# Patient Record
Sex: FEMALE | Race: WHITE | NOT HISPANIC OR LATINO
[De-identification: names, ages, dates, MRNs, and addresses within clinical notes are randomized per-mention and may not be internally consistent; named-entity substitution may affect disease eponyms.]

---

## 2017-09-07 ENCOUNTER — APPOINTMENT (OUTPATIENT)
Dept: OTOLARYNGOLOGY | Facility: CLINIC | Age: 82
End: 2017-09-07
Payer: MEDICARE

## 2017-09-07 VITALS — SYSTOLIC BLOOD PRESSURE: 161 MMHG | OXYGEN SATURATION: 96 % | DIASTOLIC BLOOD PRESSURE: 94 MMHG | HEART RATE: 80 BPM

## 2017-09-07 PROCEDURE — 99212 OFFICE O/P EST SF 10 MIN: CPT

## 2017-09-07 RX ORDER — MOMETASONE FUROATE 1 MG/G
0.1 CREAM TOPICAL TWICE DAILY
Qty: 2 | Refills: 2 | Status: ACTIVE | OUTPATIENT
Start: 2017-09-07

## 2018-11-01 ENCOUNTER — OTHER (OUTPATIENT)
Age: 83
End: 2018-11-01

## 2019-07-20 ENCOUNTER — INPATIENT (INPATIENT)
Facility: HOSPITAL | Age: 84
LOS: 4 days | Discharge: HOME | DRG: 419 | End: 2019-07-25
Attending: SURGERY | Admitting: SURGERY
Payer: MEDICARE

## 2019-07-20 VITALS
DIASTOLIC BLOOD PRESSURE: 76 MMHG | HEART RATE: 68 BPM | RESPIRATION RATE: 19 BRPM | HEIGHT: 63 IN | TEMPERATURE: 98 F | WEIGHT: 111.99 LBS | OXYGEN SATURATION: 97 % | SYSTOLIC BLOOD PRESSURE: 134 MMHG

## 2019-07-20 LAB
ALBUMIN SERPL ELPH-MCNC: 4.1 G/DL — SIGNIFICANT CHANGE UP (ref 3.3–5)
ALP SERPL-CCNC: 120 U/L — SIGNIFICANT CHANGE UP (ref 40–120)
ALT FLD-CCNC: 58 U/L — HIGH (ref 10–45)
ANION GAP SERPL CALC-SCNC: 9 MMOL/L — SIGNIFICANT CHANGE UP (ref 5–17)
APTT BLD: 28.2 SEC — SIGNIFICANT CHANGE UP (ref 27.5–36.3)
AST SERPL-CCNC: 126 U/L — HIGH (ref 10–40)
BASOPHILS # BLD AUTO: 0.02 K/UL — SIGNIFICANT CHANGE UP (ref 0–0.2)
BASOPHILS NFR BLD AUTO: 0.2 % — SIGNIFICANT CHANGE UP (ref 0–2)
BILIRUB SERPL-MCNC: 0.3 MG/DL — SIGNIFICANT CHANGE UP (ref 0.2–1.2)
BLD GP AB SCN SERPL QL: NEGATIVE — SIGNIFICANT CHANGE UP
BUN SERPL-MCNC: 21 MG/DL — SIGNIFICANT CHANGE UP (ref 7–23)
CALCIUM SERPL-MCNC: 9.3 MG/DL — SIGNIFICANT CHANGE UP (ref 8.4–10.5)
CHLORIDE SERPL-SCNC: 102 MMOL/L — SIGNIFICANT CHANGE UP (ref 96–108)
CK MB CFR SERPL CALC: 8.6 NG/ML — HIGH (ref 0–6.7)
CK SERPL-CCNC: 166 U/L — SIGNIFICANT CHANGE UP (ref 25–170)
CO2 SERPL-SCNC: 32 MMOL/L — HIGH (ref 22–31)
CREAT SERPL-MCNC: 0.97 MG/DL — SIGNIFICANT CHANGE UP (ref 0.5–1.3)
EOSINOPHIL # BLD AUTO: 0.06 K/UL — SIGNIFICANT CHANGE UP (ref 0–0.5)
EOSINOPHIL NFR BLD AUTO: 0.5 % — SIGNIFICANT CHANGE UP (ref 0–6)
GLUCOSE SERPL-MCNC: 143 MG/DL — HIGH (ref 70–99)
HCT VFR BLD CALC: 39.6 % — SIGNIFICANT CHANGE UP (ref 34.5–45)
HGB BLD-MCNC: 12.4 G/DL — SIGNIFICANT CHANGE UP (ref 11.5–15.5)
IMM GRANULOCYTES NFR BLD AUTO: 0.5 % — SIGNIFICANT CHANGE UP (ref 0–1.5)
INR BLD: 0.96 — SIGNIFICANT CHANGE UP (ref 0.88–1.16)
LACTATE SERPL-SCNC: 1.5 MMOL/L — SIGNIFICANT CHANGE UP (ref 0.5–2)
LIDOCAIN IGE QN: 39 U/L — SIGNIFICANT CHANGE UP (ref 7–60)
LYMPHOCYTES # BLD AUTO: 1.59 K/UL — SIGNIFICANT CHANGE UP (ref 1–3.3)
LYMPHOCYTES # BLD AUTO: 13.6 % — SIGNIFICANT CHANGE UP (ref 13–44)
MCHC RBC-ENTMCNC: 28.4 PG — SIGNIFICANT CHANGE UP (ref 27–34)
MCHC RBC-ENTMCNC: 31.3 GM/DL — LOW (ref 32–36)
MCV RBC AUTO: 90.8 FL — SIGNIFICANT CHANGE UP (ref 80–100)
MONOCYTES # BLD AUTO: 0.77 K/UL — SIGNIFICANT CHANGE UP (ref 0–0.9)
MONOCYTES NFR BLD AUTO: 6.6 % — SIGNIFICANT CHANGE UP (ref 2–14)
NEUTROPHILS # BLD AUTO: 9.15 K/UL — HIGH (ref 1.8–7.4)
NEUTROPHILS NFR BLD AUTO: 78.6 % — HIGH (ref 43–77)
NRBC # BLD: 0 /100 WBCS — SIGNIFICANT CHANGE UP (ref 0–0)
PLATELET # BLD AUTO: 209 K/UL — SIGNIFICANT CHANGE UP (ref 150–400)
POTASSIUM SERPL-MCNC: 3.8 MMOL/L — SIGNIFICANT CHANGE UP (ref 3.5–5.3)
POTASSIUM SERPL-SCNC: 3.8 MMOL/L — SIGNIFICANT CHANGE UP (ref 3.5–5.3)
PROT SERPL-MCNC: 6.7 G/DL — SIGNIFICANT CHANGE UP (ref 6–8.3)
PROTHROM AB SERPL-ACNC: 10.8 SEC — SIGNIFICANT CHANGE UP (ref 10–12.9)
RBC # BLD: 4.36 M/UL — SIGNIFICANT CHANGE UP (ref 3.8–5.2)
RBC # FLD: 13.2 % — SIGNIFICANT CHANGE UP (ref 10.3–14.5)
RH IG SCN BLD-IMP: POSITIVE — SIGNIFICANT CHANGE UP
SODIUM SERPL-SCNC: 143 MMOL/L — SIGNIFICANT CHANGE UP (ref 135–145)
TROPONIN T SERPL-MCNC: <0.01 NG/ML — SIGNIFICANT CHANGE UP (ref 0–0.01)
WBC # BLD: 11.65 K/UL — HIGH (ref 3.8–10.5)
WBC # FLD AUTO: 11.65 K/UL — HIGH (ref 3.8–10.5)

## 2019-07-20 PROCEDURE — 71045 X-RAY EXAM CHEST 1 VIEW: CPT | Mod: 26

## 2019-07-20 PROCEDURE — 99285 EMERGENCY DEPT VISIT HI MDM: CPT | Mod: 25

## 2019-07-20 PROCEDURE — 93010 ELECTROCARDIOGRAM REPORT: CPT

## 2019-07-20 PROCEDURE — 76705 ECHO EXAM OF ABDOMEN: CPT | Mod: 26

## 2019-07-20 RX ORDER — ONDANSETRON 8 MG/1
4 TABLET, FILM COATED ORAL ONCE
Refills: 0 | Status: COMPLETED | OUTPATIENT
Start: 2019-07-20 | End: 2019-07-20

## 2019-07-20 RX ORDER — FAMOTIDINE 10 MG/ML
20 INJECTION INTRAVENOUS ONCE
Refills: 0 | Status: COMPLETED | OUTPATIENT
Start: 2019-07-20 | End: 2019-07-20

## 2019-07-20 RX ORDER — SODIUM CHLORIDE 9 MG/ML
3 INJECTION INTRAMUSCULAR; INTRAVENOUS; SUBCUTANEOUS ONCE
Refills: 0 | Status: COMPLETED | OUTPATIENT
Start: 2019-07-20 | End: 2019-07-20

## 2019-07-20 RX ORDER — ACETAMINOPHEN 500 MG
1000 TABLET ORAL ONCE
Refills: 0 | Status: COMPLETED | OUTPATIENT
Start: 2019-07-20 | End: 2019-07-20

## 2019-07-20 RX ORDER — POTASSIUM CHLORIDE 20 MEQ
20 PACKET (EA) ORAL ONCE
Refills: 0 | Status: COMPLETED | OUTPATIENT
Start: 2019-07-20 | End: 2019-07-20

## 2019-07-20 RX ORDER — SODIUM CHLORIDE 9 MG/ML
1000 INJECTION, SOLUTION INTRAVENOUS
Refills: 0 | Status: DISCONTINUED | OUTPATIENT
Start: 2019-07-20 | End: 2019-07-24

## 2019-07-20 RX ORDER — HYDROMORPHONE HYDROCHLORIDE 2 MG/ML
0.5 INJECTION INTRAMUSCULAR; INTRAVENOUS; SUBCUTANEOUS EVERY 6 HOURS
Refills: 0 | Status: DISCONTINUED | OUTPATIENT
Start: 2019-07-20 | End: 2019-07-24

## 2019-07-20 RX ORDER — ENOXAPARIN SODIUM 100 MG/ML
40 INJECTION SUBCUTANEOUS EVERY 24 HOURS
Refills: 0 | Status: DISCONTINUED | OUTPATIENT
Start: 2019-07-20 | End: 2019-07-24

## 2019-07-20 RX ORDER — METRONIDAZOLE 500 MG
500 TABLET ORAL EVERY 8 HOURS
Refills: 0 | Status: DISCONTINUED | OUTPATIENT
Start: 2019-07-20 | End: 2019-07-24

## 2019-07-20 RX ORDER — SODIUM CHLORIDE 9 MG/ML
1000 INJECTION INTRAMUSCULAR; INTRAVENOUS; SUBCUTANEOUS
Refills: 0 | Status: DISCONTINUED | OUTPATIENT
Start: 2019-07-20 | End: 2019-07-20

## 2019-07-20 RX ORDER — PIPERACILLIN AND TAZOBACTAM 4; .5 G/20ML; G/20ML
3.38 INJECTION, POWDER, LYOPHILIZED, FOR SOLUTION INTRAVENOUS ONCE
Refills: 0 | Status: COMPLETED | OUTPATIENT
Start: 2019-07-20 | End: 2019-07-20

## 2019-07-20 RX ORDER — ONDANSETRON 8 MG/1
4 TABLET, FILM COATED ORAL EVERY 6 HOURS
Refills: 0 | Status: DISCONTINUED | OUTPATIENT
Start: 2019-07-20 | End: 2019-07-24

## 2019-07-20 RX ORDER — CEFTRIAXONE 500 MG/1
1000 INJECTION, POWDER, FOR SOLUTION INTRAMUSCULAR; INTRAVENOUS EVERY 24 HOURS
Refills: 0 | Status: COMPLETED | OUTPATIENT
Start: 2019-07-20 | End: 2019-07-24

## 2019-07-20 RX ORDER — LANOLIN ALCOHOL/MO/W.PET/CERES
5 CREAM (GRAM) TOPICAL AT BEDTIME
Refills: 0 | Status: DISCONTINUED | OUTPATIENT
Start: 2019-07-20 | End: 2019-07-24

## 2019-07-20 RX ADMIN — ONDANSETRON 4 MILLIGRAM(S): 8 TABLET, FILM COATED ORAL at 02:43

## 2019-07-20 RX ADMIN — Medication 400 MILLIGRAM(S): at 15:42

## 2019-07-20 RX ADMIN — CEFTRIAXONE 100 MILLIGRAM(S): 500 INJECTION, POWDER, FOR SOLUTION INTRAMUSCULAR; INTRAVENOUS at 10:00

## 2019-07-20 RX ADMIN — PIPERACILLIN AND TAZOBACTAM 3.38 GRAM(S): 4; .5 INJECTION, POWDER, LYOPHILIZED, FOR SOLUTION INTRAVENOUS at 04:00

## 2019-07-20 RX ADMIN — PIPERACILLIN AND TAZOBACTAM 200 GRAM(S): 4; .5 INJECTION, POWDER, LYOPHILIZED, FOR SOLUTION INTRAVENOUS at 03:02

## 2019-07-20 RX ADMIN — FAMOTIDINE 20 MILLIGRAM(S): 10 INJECTION INTRAVENOUS at 02:42

## 2019-07-20 RX ADMIN — ENOXAPARIN SODIUM 40 MILLIGRAM(S): 100 INJECTION SUBCUTANEOUS at 09:59

## 2019-07-20 RX ADMIN — Medication 400 MILLIGRAM(S): at 06:41

## 2019-07-20 RX ADMIN — Medication 100 MILLIGRAM(S): at 10:45

## 2019-07-20 RX ADMIN — Medication 5 MILLIGRAM(S): at 22:05

## 2019-07-20 RX ADMIN — SODIUM CHLORIDE 3 MILLILITER(S): 9 INJECTION INTRAMUSCULAR; INTRAVENOUS; SUBCUTANEOUS at 02:36

## 2019-07-20 RX ADMIN — Medication 1000 MILLIGRAM(S): at 17:25

## 2019-07-20 RX ADMIN — Medication 100 MILLIGRAM(S): at 17:24

## 2019-07-20 RX ADMIN — SODIUM CHLORIDE 125 MILLILITER(S): 9 INJECTION INTRAMUSCULAR; INTRAVENOUS; SUBCUTANEOUS at 02:42

## 2019-07-20 RX ADMIN — SODIUM CHLORIDE 90 MILLILITER(S): 9 INJECTION, SOLUTION INTRAVENOUS at 23:10

## 2019-07-20 RX ADMIN — Medication 20 MILLIEQUIVALENT(S): at 14:07

## 2019-07-20 NOTE — ED PROVIDER NOTE - CLINICAL SUMMARY MEDICAL DECISION MAKING FREE TEXT BOX
cholethiasis- CBD  7 mm mult stones  admit to Dr melissa martinez 83 yo F with cholethiasis  no obv cute cholycystitis- but- CBD  7 mm mult stones - no signs to suggest acute cholangitis-  admit to Dr Yoly Bills - tele surgery  GI to follow - likely ERCP in 1-2 days

## 2019-07-20 NOTE — H&P ADULT - NSHPLABSRESULTS_GEN_ALL_CORE
12.4   11.65 )-----------( 209      ( 20 Jul 2019 02:16 )             39.6     07-20    143  |  102  |  21  ----------------------------<  143<H>  3.8   |  32<H>  |  0.97    Ca    9.3      20 Jul 2019 02:16    TPro  6.7  /  Alb  4.1  /  TBili  0.3  /  DBili  x   /  AST  126<H>  /  ALT  58<H>  /  AlkPhos  120  07-20    PT/INR - ( 20 Jul 2019 02:16 )   PT: 10.8 sec;   INR: 0.96          PTT - ( 20 Jul 2019 02:16 )  PTT:28.2 sec    RADIOLOGY  < from: US Abdomen Limited (07.20.19 @ 03:27) >    Common bile duct: Normal diameter, measuring 0.6 cm. A few stones are   seen within thecommon bile duct measuring up to 7 mm.    Gallbladder: Distended and contains multiple mobile stones gallstones.   The largest stone measures up to 1.7 cm No wall thickening or   pericholecystic fluid.    < end of copied text >

## 2019-07-20 NOTE — H&P ADULT - ASSESSMENT
84F PMHx dementia, HTN, HLD, hypothyroidism a/w choledocholithiasis.    pain/nausea control  home meds as appropriate  IS/OOB  NPO, IVF  ceftriaxone, Flagyl  SCDs, lovenox    admit to general surgery Team 4 [facility] telemetry  case discussed with chief resident, attending

## 2019-07-20 NOTE — CONSULT NOTE ADULT - ASSESSMENT
84F PMHx dementia, HTN, HLD, hypothyroidism presents with sudden onset of epigastric abdominal pain    Pain abdomen:  -no fever, vss  -Bili/ALP normal, AST//58  -RUQ US: no cholecystitis, no IHB dilatation, CBD normal at 6 mm but stones seen in CBD  - 84F PMHx dementia, HTN, HLD, hypothyroidism presents with sudden onset of epigastric abdominal pain    Pain abdomen: choledocholithiasis with gallstones  -no fever, vss  -Bili/ALP normal, AST//58  -RUQ US: no cholecystitis, no IHB dilatation, CBD normal at 6 mm but stones seen in CBD, gall stones +  -currently no e/o cholangitis, pt denies any pain abdomen  -will plan for ERCP on monday  -can start clears and advance as tolerated  -npo after midnight Sunday night  -c/w abx for now  -trend LFTs, notify GI fellow on call if nay change in clinical status      discussed with Dr Huang

## 2019-07-20 NOTE — ED ADULT NURSE NOTE - OBJECTIVE STATEMENT
Patient came in to ED c/o epigastric pain starting at 1 am. Patient denies nausea/vomiting, no SOB, no BL LE swelling noted. A+O x 2. Reports last BM yesterday,

## 2019-07-20 NOTE — H&P ADULT - HISTORY OF PRESENT ILLNESS
84F PMHx dementia, HTN, HLD, hypothyroidism presents with sudden onset of epigastric abdominal pain, nonradiating, no aggravating or alleviating factors, since 1am this morning, associated with mild nausea, no vomiting. Unsure when she last passed flatus; last bowel movement yesterday. No fevers, chills, chest pain, dyspnea, diarrhea, constipation, obstipation or calf pain. Has had symptoms like this intermittently over the last few months, usually alleviated with seltzer. Does not remember her last colonoscopy.

## 2019-07-20 NOTE — CONSULT NOTE ADULT - SUBJECTIVE AND OBJECTIVE BOX
HPI:  84F PMHx dementia, HTN, HLD, hypothyroidism presents with sudden onset of epigastric abdominal pain, nonradiating, no aggravating or alleviating factors, since 1am this morning, associated with mild nausea, no vomiting. Unsure when she last passed flatus; last bowel movement yesterday. No fevers, chills, chest pain, dyspnea, diarrhea, constipation, obstipation or calf pain. Has had symptoms like this intermittently over the last few months, usually alleviated with seltzer. Does not remember her last colonoscopy.    Allergies    No Known Allergies    Intolerances      Home Medications:  amLODIPine 2.5 mg oral tablet: 1 tab(s) orally once a day (2019 02:19)  Aricept ODT 10 mg oral tablet, disintegratin tab(s) orally once a day (at bedtime) (2019 02:19)  Aspir 81 oral delayed release tablet: 1 tab(s) orally once a day (2019 02:19)  Lipitor 10 mg oral tablet: 1 tab(s) orally once a day (2019 02:19)  Synthroid 50 mcg (0.05 mg) oral tablet: 1 tab(s) orally once a day (2019 02:19)    MEDICATIONS:  MEDICATIONS  (STANDING):  cefTRIAXone   IVPB 1000 milliGRAM(s) IV Intermittent every 24 hours  enoxaparin Injectable 40 milliGRAM(s) SubCutaneous every 24 hours  lactated ringers. 1000 milliLiter(s) (90 mL/Hr) IV Continuous <Continuous>  metroNIDAZOLE  IVPB 500 milliGRAM(s) IV Intermittent every 8 hours    MEDICATIONS  (PRN):  HYDROmorphone  Injectable 0.5 milliGRAM(s) IV Push every 6 hours PRN Severe Pain (7 - 10)  ondansetron Injectable 4 milliGRAM(s) IV Push every 6 hours PRN Nausea    PAST MEDICAL & SURGICAL HISTORY:  Dementia  HLD (hyperlipidemia)  Hypothyroid  HTN (hypertension)  No significant past surgical history    FAMILY HISTORY:  No pertinent family history in first degree relatives    SOCIAL HISTORY:  [place]: [ ] Current, [ ] Former, [ ] Never; Pack Years:  Alcohol:  Illicit Drugs:    REVIEW OF SYSTEMS:  CONSTITUTIONAL: No weakness, fevers or chills  HEENT: No visual changes; No vertigo or throat pain   NECK: No pain or stiffness  RESPIRATORY: No cough, wheezing, hemoptysis; No shortness of breath  CARDIOVASCULAR: No chest pain or palpitations  GASTROINTESTINAL: No abdominal or epigastric pain. No nausea, vomiting, or hematemesis; No diarrhea or constipation. No melena or hematochezia.  GENITOURINARY: No dysuria, frequency or hematuria  NEUROLOGICAL: No numbness or weakness  SKIN: No itching, burning, rashes, or lesions   All other 10 review of systems is negative unless indicated above.    Vital Signs Last 24 Hrs  T(C): 36.2 (2019 06:26), Max: 36.7 (2019 02:02)  T(F): 97.2 (2019 06:26), Max: 98 (2019 02:02)  HR: 70 (2019 08:40) (65 - 74)  BP: 125/74 (2019 08:40) (122/74 - 147/65)  BP(mean): 93 (2019 08:40) (93 - 93)  RR: 18 (2019 08:40) (16 - 19)  SpO2: 96% (2019 08:40) (96% - 99%)      PHYSICAL EXAM:    General: Well developed; well nourished; in no acute distress  Eyes: Anicteric sclerae, moist conjunctivae  HENT: Moist mucous membranes  Neck: Trachea midline, supple  Lungs: Normal respiratory effors and no intercostal retractions  Cardiovascular: RRR  Abdomen: Soft, non-tender non-distended; Normal bowel sounds; No rebound or guarding  Extremities: Normal range of motion, No clubbing, cyanosis or edema  Neurological: Alert and oriented x3  Skin: Warm and dry. No obvious rash    LABS:                        12.4   11.65 )-----------( 209      ( 2019 02:16 )             39.6     07-20    143  |  102  |  21  ----------------------------<  143<H>  3.8   |  32<H>  |  0.97    Ca    9.3      2019 02:16    TPro  6.7  /  Alb  4.1  /  TBili  0.3  /  DBili  x   /  AST  126<H>  /  ALT  58<H>  /  AlkPhos  120  07-20        PT/INR - ( 2019 02:16 )   PT: 10.8 sec;   INR: 0.96          PTT - ( 2019 02:16 )  PTT:28.2 sec    RADIOLOGY & ADDITIONAL STUDIES:

## 2019-07-20 NOTE — H&P ADULT - NSHPPHYSICALEXAM_GEN_ALL_CORE
T(C): 36.7 (07-20-19 @ 02:02), Max: 36.7 (07-20-19 @ 02:02)  HR: 68 (07-20-19 @ 02:02) (68 - 68)  BP: 134/76 (07-20-19 @ 02:02) (134/76 - 134/76)  RR: 19 (07-20-19 @ 02:02) (19 - 19)  SpO2: 97% (07-20-19 @ 02:02) (97% - 97%)    GEN: NAD, resting comfortably in bed  HEENT: MMM  CV: RRR  Resp: nonlabored breathing, no respiratory distress  Abd: soft, nondistended, mild epigastric tenderness, no Torres's, no surgical scars  Ext: WWP, no edema, no calf tenderness  Neuro: no focal deficits  Psych: pleasant

## 2019-07-20 NOTE — ED ADULT NURSE NOTE - CHPI ED NUR SYMPTOMS NEG
no fever/no hematuria/no nausea/no diarrhea/no blood in stool/no abdominal distension/no burning urination/no chills/no vomiting/no dysuria

## 2019-07-20 NOTE — ED ADULT NURSE REASSESSMENT NOTE - NS ED NURSE REASSESS COMMENT FT1
Patient informed by MD of ultrasound results. Surgery consult requested and pending at this time.
S/p ultrasound, results pending. No c/o voiced at this time. Ambulated to bathroom with standby assistance. IVF continues. Continue to monitor

## 2019-07-20 NOTE — PATIENT PROFILE ADULT - JOB HELP
Instructed patient/caregiver regarding signs and symptoms of infection./Keep the cast/splint/dressing clean and dry./Verbal/written post procedure instructions were given to patient/caregiver./Instructed patient/caregiver to follow-up with primary care physician.
no

## 2019-07-20 NOTE — ED PROVIDER NOTE - OBJECTIVE STATEMENT
85 yo F with hx of HTN HLD hypothyroidism with onset of epigastric pain nausea radiating up to chest 1 hr ago no sob  no diaphoresis - had dinner at 9 pm soup chicken  no diarrhea no melena or hematochezia  no fevers  no prior hx of abd surgeries or known GERD no back pain 85 yo F with hx of HTN HLD hypothyroidism with onset of epigastric pain nausea radiating up to chest 1 hr ago no sob - no diaphoresis - had dinner at 9 pm soup chicken  no diarrhea no melena or hematochezia  no fevers  no prior hx of abd surgeries or known GERD no back pain - no leg pain or calf tenderness - no recent travel or trauma

## 2019-07-21 LAB
AMYLASE P1 CFR SERPL: 46 U/L — SIGNIFICANT CHANGE UP (ref 25–125)
ANION GAP SERPL CALC-SCNC: 10 MMOL/L — SIGNIFICANT CHANGE UP (ref 5–17)
APTT BLD: 34.8 SEC — SIGNIFICANT CHANGE UP (ref 27.5–36.3)
BLD GP AB SCN SERPL QL: NEGATIVE — SIGNIFICANT CHANGE UP
BUN SERPL-MCNC: 8 MG/DL — SIGNIFICANT CHANGE UP (ref 7–23)
CALCIUM SERPL-MCNC: 8.8 MG/DL — SIGNIFICANT CHANGE UP (ref 8.4–10.5)
CHLORIDE SERPL-SCNC: 103 MMOL/L — SIGNIFICANT CHANGE UP (ref 96–108)
CO2 SERPL-SCNC: 27 MMOL/L — SIGNIFICANT CHANGE UP (ref 22–31)
CREAT SERPL-MCNC: 0.61 MG/DL — SIGNIFICANT CHANGE UP (ref 0.5–1.3)
GLUCOSE SERPL-MCNC: 100 MG/DL — HIGH (ref 70–99)
HCT VFR BLD CALC: 41 % — SIGNIFICANT CHANGE UP (ref 34.5–45)
HCT VFR BLD CALC: 43.5 % — SIGNIFICANT CHANGE UP (ref 34.5–45)
HGB BLD-MCNC: 12.6 G/DL — SIGNIFICANT CHANGE UP (ref 11.5–15.5)
HGB BLD-MCNC: 13.2 G/DL — SIGNIFICANT CHANGE UP (ref 11.5–15.5)
INR BLD: 1.03 — SIGNIFICANT CHANGE UP (ref 0.88–1.16)
LIDOCAIN IGE QN: 28 U/L — SIGNIFICANT CHANGE UP (ref 7–60)
MCHC RBC-ENTMCNC: 28.1 PG — SIGNIFICANT CHANGE UP (ref 27–34)
MCHC RBC-ENTMCNC: 28.6 PG — SIGNIFICANT CHANGE UP (ref 27–34)
MCHC RBC-ENTMCNC: 30.3 GM/DL — LOW (ref 32–36)
MCHC RBC-ENTMCNC: 30.7 GM/DL — LOW (ref 32–36)
MCV RBC AUTO: 91.5 FL — SIGNIFICANT CHANGE UP (ref 80–100)
MCV RBC AUTO: 94.2 FL — SIGNIFICANT CHANGE UP (ref 80–100)
NRBC # BLD: 0 /100 WBCS — SIGNIFICANT CHANGE UP (ref 0–0)
NRBC # BLD: 0 /100 WBCS — SIGNIFICANT CHANGE UP (ref 0–0)
PLATELET # BLD AUTO: 199 K/UL — SIGNIFICANT CHANGE UP (ref 150–400)
PLATELET # BLD AUTO: 206 K/UL — SIGNIFICANT CHANGE UP (ref 150–400)
POTASSIUM SERPL-MCNC: 3.9 MMOL/L — SIGNIFICANT CHANGE UP (ref 3.5–5.3)
POTASSIUM SERPL-SCNC: 3.9 MMOL/L — SIGNIFICANT CHANGE UP (ref 3.5–5.3)
PROTHROM AB SERPL-ACNC: 11.7 SEC — SIGNIFICANT CHANGE UP (ref 10–12.9)
RBC # BLD: 4.48 M/UL — SIGNIFICANT CHANGE UP (ref 3.8–5.2)
RBC # BLD: 4.62 M/UL — SIGNIFICANT CHANGE UP (ref 3.8–5.2)
RBC # FLD: 13.4 % — SIGNIFICANT CHANGE UP (ref 10.3–14.5)
RBC # FLD: 13.4 % — SIGNIFICANT CHANGE UP (ref 10.3–14.5)
RH IG SCN BLD-IMP: POSITIVE — SIGNIFICANT CHANGE UP
SODIUM SERPL-SCNC: 140 MMOL/L — SIGNIFICANT CHANGE UP (ref 135–145)
WBC # BLD: 5.55 K/UL — SIGNIFICANT CHANGE UP (ref 3.8–10.5)
WBC # BLD: 7.36 K/UL — SIGNIFICANT CHANGE UP (ref 3.8–10.5)
WBC # FLD AUTO: 5.55 K/UL — SIGNIFICANT CHANGE UP (ref 3.8–10.5)
WBC # FLD AUTO: 7.36 K/UL — SIGNIFICANT CHANGE UP (ref 3.8–10.5)

## 2019-07-21 PROCEDURE — 99232 SBSQ HOSP IP/OBS MODERATE 35: CPT | Mod: GC

## 2019-07-21 RX ORDER — ATORVASTATIN CALCIUM 80 MG/1
1 TABLET, FILM COATED ORAL
Qty: 0 | Refills: 0 | DISCHARGE

## 2019-07-21 RX ORDER — DONEPEZIL HYDROCHLORIDE 10 MG/1
1 TABLET, FILM COATED ORAL
Qty: 0 | Refills: 0 | DISCHARGE

## 2019-07-21 RX ORDER — SCOPALAMINE 1 MG/3D
1 PATCH, EXTENDED RELEASE TRANSDERMAL ONCE
Refills: 0 | Status: COMPLETED | OUTPATIENT
Start: 2019-07-21 | End: 2019-07-21

## 2019-07-21 RX ORDER — AMLODIPINE BESYLATE 2.5 MG/1
1 TABLET ORAL
Qty: 0 | Refills: 0 | DISCHARGE

## 2019-07-21 RX ORDER — ACETAMINOPHEN 500 MG
1000 TABLET ORAL ONCE
Refills: 0 | Status: COMPLETED | OUTPATIENT
Start: 2019-07-21 | End: 2019-07-21

## 2019-07-21 RX ORDER — ASPIRIN/CALCIUM CARB/MAGNESIUM 324 MG
1 TABLET ORAL
Qty: 0 | Refills: 0 | DISCHARGE

## 2019-07-21 RX ORDER — LEVOTHYROXINE SODIUM 125 MCG
1 TABLET ORAL
Qty: 0 | Refills: 0 | DISCHARGE

## 2019-07-21 RX ADMIN — Medication 1000 MILLIGRAM(S): at 18:56

## 2019-07-21 RX ADMIN — Medication 100 MILLIGRAM(S): at 16:43

## 2019-07-21 RX ADMIN — Medication 100 MILLIGRAM(S): at 07:21

## 2019-07-21 RX ADMIN — Medication 5 MILLIGRAM(S): at 21:39

## 2019-07-21 RX ADMIN — SCOPALAMINE 1 PATCH: 1 PATCH, EXTENDED RELEASE TRANSDERMAL at 12:26

## 2019-07-21 RX ADMIN — SODIUM CHLORIDE 90 MILLILITER(S): 9 INJECTION, SOLUTION INTRAVENOUS at 21:40

## 2019-07-21 RX ADMIN — HYDROMORPHONE HYDROCHLORIDE 0.5 MILLIGRAM(S): 2 INJECTION INTRAMUSCULAR; INTRAVENOUS; SUBCUTANEOUS at 11:04

## 2019-07-21 RX ADMIN — CEFTRIAXONE 100 MILLIGRAM(S): 500 INJECTION, POWDER, FOR SOLUTION INTRAMUSCULAR; INTRAVENOUS at 10:20

## 2019-07-21 RX ADMIN — Medication 100 MILLIGRAM(S): at 23:31

## 2019-07-21 RX ADMIN — HYDROMORPHONE HYDROCHLORIDE 0.5 MILLIGRAM(S): 2 INJECTION INTRAMUSCULAR; INTRAVENOUS; SUBCUTANEOUS at 10:49

## 2019-07-21 RX ADMIN — Medication 100 MILLIGRAM(S): at 00:05

## 2019-07-21 RX ADMIN — Medication 400 MILLIGRAM(S): at 18:56

## 2019-07-21 RX ADMIN — ONDANSETRON 4 MILLIGRAM(S): 8 TABLET, FILM COATED ORAL at 11:05

## 2019-07-21 RX ADMIN — SCOPALAMINE 1 PATCH: 1 PATCH, EXTENDED RELEASE TRANSDERMAL at 19:44

## 2019-07-21 RX ADMIN — ENOXAPARIN SODIUM 40 MILLIGRAM(S): 100 INJECTION SUBCUTANEOUS at 10:21

## 2019-07-22 LAB
ALBUMIN SERPL ELPH-MCNC: 3.5 G/DL — SIGNIFICANT CHANGE UP (ref 3.3–5)
ALP SERPL-CCNC: 165 U/L — HIGH (ref 40–120)
ALT FLD-CCNC: 150 U/L — HIGH (ref 10–45)
ANION GAP SERPL CALC-SCNC: 8 MMOL/L — SIGNIFICANT CHANGE UP (ref 5–17)
AST SERPL-CCNC: 88 U/L — HIGH (ref 10–40)
BILIRUB DIRECT SERPL-MCNC: <0.2 MG/DL — SIGNIFICANT CHANGE UP (ref 0–0.2)
BILIRUB INDIRECT FLD-MCNC: >0.1 MG/DL — LOW (ref 0.2–1)
BILIRUB SERPL-MCNC: 0.3 MG/DL — SIGNIFICANT CHANGE UP (ref 0.2–1.2)
BUN SERPL-MCNC: 9 MG/DL — SIGNIFICANT CHANGE UP (ref 7–23)
CALCIUM SERPL-MCNC: 8.7 MG/DL — SIGNIFICANT CHANGE UP (ref 8.4–10.5)
CHLORIDE SERPL-SCNC: 104 MMOL/L — SIGNIFICANT CHANGE UP (ref 96–108)
CO2 SERPL-SCNC: 28 MMOL/L — SIGNIFICANT CHANGE UP (ref 22–31)
CREAT SERPL-MCNC: 0.67 MG/DL — SIGNIFICANT CHANGE UP (ref 0.5–1.3)
GLUCOSE SERPL-MCNC: 88 MG/DL — SIGNIFICANT CHANGE UP (ref 70–99)
HCT VFR BLD CALC: 41.6 % — SIGNIFICANT CHANGE UP (ref 34.5–45)
HGB BLD-MCNC: 12.7 G/DL — SIGNIFICANT CHANGE UP (ref 11.5–15.5)
MAGNESIUM SERPL-MCNC: 2 MG/DL — SIGNIFICANT CHANGE UP (ref 1.6–2.6)
MCHC RBC-ENTMCNC: 28.4 PG — SIGNIFICANT CHANGE UP (ref 27–34)
MCHC RBC-ENTMCNC: 30.5 GM/DL — LOW (ref 32–36)
MCV RBC AUTO: 93.1 FL — SIGNIFICANT CHANGE UP (ref 80–100)
NRBC # BLD: 0 /100 WBCS — SIGNIFICANT CHANGE UP (ref 0–0)
PHOSPHATE SERPL-MCNC: 3 MG/DL — SIGNIFICANT CHANGE UP (ref 2.5–4.5)
PLATELET # BLD AUTO: 213 K/UL — SIGNIFICANT CHANGE UP (ref 150–400)
POTASSIUM SERPL-MCNC: 3.7 MMOL/L — SIGNIFICANT CHANGE UP (ref 3.5–5.3)
POTASSIUM SERPL-SCNC: 3.7 MMOL/L — SIGNIFICANT CHANGE UP (ref 3.5–5.3)
PROT SERPL-MCNC: 6 G/DL — SIGNIFICANT CHANGE UP (ref 6–8.3)
RBC # BLD: 4.47 M/UL — SIGNIFICANT CHANGE UP (ref 3.8–5.2)
RBC # FLD: 13.3 % — SIGNIFICANT CHANGE UP (ref 10.3–14.5)
SODIUM SERPL-SCNC: 140 MMOL/L — SIGNIFICANT CHANGE UP (ref 135–145)
WBC # BLD: 5.26 K/UL — SIGNIFICANT CHANGE UP (ref 3.8–10.5)
WBC # FLD AUTO: 5.26 K/UL — SIGNIFICANT CHANGE UP (ref 3.8–10.5)

## 2019-07-22 PROCEDURE — 99232 SBSQ HOSP IP/OBS MODERATE 35: CPT | Mod: GC

## 2019-07-22 RX ORDER — POTASSIUM CHLORIDE 20 MEQ
20 PACKET (EA) ORAL ONCE
Refills: 0 | Status: COMPLETED | OUTPATIENT
Start: 2019-07-22 | End: 2019-07-22

## 2019-07-22 RX ORDER — POTASSIUM CHLORIDE 20 MEQ
10 PACKET (EA) ORAL
Refills: 0 | Status: DISCONTINUED | OUTPATIENT
Start: 2019-07-22 | End: 2019-07-22

## 2019-07-22 RX ORDER — IBUPROFEN 200 MG
200 TABLET ORAL EVERY 6 HOURS
Refills: 0 | Status: DISCONTINUED | OUTPATIENT
Start: 2019-07-22 | End: 2019-07-24

## 2019-07-22 RX ADMIN — Medication 200 MILLIGRAM(S): at 20:32

## 2019-07-22 RX ADMIN — Medication 5 MILLIGRAM(S): at 21:28

## 2019-07-22 RX ADMIN — Medication 20 MILLIEQUIVALENT(S): at 19:22

## 2019-07-22 RX ADMIN — Medication 200 MILLIGRAM(S): at 20:15

## 2019-07-22 RX ADMIN — Medication 200 MILLIGRAM(S): at 10:30

## 2019-07-22 RX ADMIN — ENOXAPARIN SODIUM 40 MILLIGRAM(S): 100 INJECTION SUBCUTANEOUS at 09:09

## 2019-07-22 RX ADMIN — SCOPALAMINE 1 PATCH: 1 PATCH, EXTENDED RELEASE TRANSDERMAL at 19:42

## 2019-07-22 RX ADMIN — Medication 100 MILLIGRAM(S): at 07:14

## 2019-07-22 RX ADMIN — Medication 100 MILLIGRAM(S): at 16:46

## 2019-07-22 RX ADMIN — CEFTRIAXONE 100 MILLIGRAM(S): 500 INJECTION, POWDER, FOR SOLUTION INTRAMUSCULAR; INTRAVENOUS at 09:09

## 2019-07-22 RX ADMIN — Medication 200 MILLIGRAM(S): at 11:15

## 2019-07-22 RX ADMIN — SCOPALAMINE 1 PATCH: 1 PATCH, EXTENDED RELEASE TRANSDERMAL at 06:45

## 2019-07-23 LAB
ALBUMIN SERPL ELPH-MCNC: 3.2 G/DL — LOW (ref 3.3–5)
ALP SERPL-CCNC: 144 U/L — HIGH (ref 40–120)
ALT FLD-CCNC: 106 U/L — HIGH (ref 10–45)
ANION GAP SERPL CALC-SCNC: 9 MMOL/L — SIGNIFICANT CHANGE UP (ref 5–17)
APTT BLD: 35.5 SEC — SIGNIFICANT CHANGE UP (ref 27.5–36.3)
AST SERPL-CCNC: 58 U/L — HIGH (ref 10–40)
BILIRUB SERPL-MCNC: 0.2 MG/DL — SIGNIFICANT CHANGE UP (ref 0.2–1.2)
BLD GP AB SCN SERPL QL: NEGATIVE — SIGNIFICANT CHANGE UP
BUN SERPL-MCNC: 9 MG/DL — SIGNIFICANT CHANGE UP (ref 7–23)
CALCIUM SERPL-MCNC: 8.8 MG/DL — SIGNIFICANT CHANGE UP (ref 8.4–10.5)
CHLORIDE SERPL-SCNC: 106 MMOL/L — SIGNIFICANT CHANGE UP (ref 96–108)
CO2 SERPL-SCNC: 29 MMOL/L — SIGNIFICANT CHANGE UP (ref 22–31)
CREAT SERPL-MCNC: 0.69 MG/DL — SIGNIFICANT CHANGE UP (ref 0.5–1.3)
GLUCOSE SERPL-MCNC: 79 MG/DL — SIGNIFICANT CHANGE UP (ref 70–99)
HCT VFR BLD CALC: 39.4 % — SIGNIFICANT CHANGE UP (ref 34.5–45)
HGB BLD-MCNC: 12.2 G/DL — SIGNIFICANT CHANGE UP (ref 11.5–15.5)
INR BLD: 1.07 — SIGNIFICANT CHANGE UP (ref 0.88–1.16)
MAGNESIUM SERPL-MCNC: 1.7 MG/DL — SIGNIFICANT CHANGE UP (ref 1.6–2.6)
MCHC RBC-ENTMCNC: 28.6 PG — SIGNIFICANT CHANGE UP (ref 27–34)
MCHC RBC-ENTMCNC: 31 GM/DL — LOW (ref 32–36)
MCV RBC AUTO: 92.5 FL — SIGNIFICANT CHANGE UP (ref 80–100)
NRBC # BLD: 0 /100 WBCS — SIGNIFICANT CHANGE UP (ref 0–0)
PHOSPHATE SERPL-MCNC: 2.9 MG/DL — SIGNIFICANT CHANGE UP (ref 2.5–4.5)
PLATELET # BLD AUTO: 198 K/UL — SIGNIFICANT CHANGE UP (ref 150–400)
POTASSIUM SERPL-MCNC: 4.1 MMOL/L — SIGNIFICANT CHANGE UP (ref 3.5–5.3)
POTASSIUM SERPL-SCNC: 4.1 MMOL/L — SIGNIFICANT CHANGE UP (ref 3.5–5.3)
PROT SERPL-MCNC: 5.5 G/DL — LOW (ref 6–8.3)
PROTHROM AB SERPL-ACNC: 12.1 SEC — SIGNIFICANT CHANGE UP (ref 10–12.9)
RBC # BLD: 4.26 M/UL — SIGNIFICANT CHANGE UP (ref 3.8–5.2)
RBC # FLD: 13.6 % — SIGNIFICANT CHANGE UP (ref 10.3–14.5)
RH IG SCN BLD-IMP: POSITIVE — SIGNIFICANT CHANGE UP
SODIUM SERPL-SCNC: 144 MMOL/L — SIGNIFICANT CHANGE UP (ref 135–145)
WBC # BLD: 6.96 K/UL — SIGNIFICANT CHANGE UP (ref 3.8–10.5)
WBC # FLD AUTO: 6.96 K/UL — SIGNIFICANT CHANGE UP (ref 3.8–10.5)

## 2019-07-23 PROCEDURE — 99232 SBSQ HOSP IP/OBS MODERATE 35: CPT | Mod: GC,57

## 2019-07-23 RX ORDER — MAGNESIUM SULFATE 500 MG/ML
1 VIAL (ML) INJECTION ONCE
Refills: 0 | Status: COMPLETED | OUTPATIENT
Start: 2019-07-23 | End: 2019-07-23

## 2019-07-23 RX ADMIN — Medication 100 GRAM(S): at 11:30

## 2019-07-23 RX ADMIN — Medication 100 MILLIGRAM(S): at 18:56

## 2019-07-23 RX ADMIN — Medication 100 MILLIGRAM(S): at 03:05

## 2019-07-23 RX ADMIN — ENOXAPARIN SODIUM 40 MILLIGRAM(S): 100 INJECTION SUBCUTANEOUS at 09:36

## 2019-07-23 RX ADMIN — SCOPALAMINE 1 PATCH: 1 PATCH, EXTENDED RELEASE TRANSDERMAL at 07:49

## 2019-07-23 RX ADMIN — CEFTRIAXONE 100 MILLIGRAM(S): 500 INJECTION, POWDER, FOR SOLUTION INTRAMUSCULAR; INTRAVENOUS at 09:36

## 2019-07-23 RX ADMIN — Medication 100 MILLIGRAM(S): at 09:36

## 2019-07-24 ENCOUNTER — RESULT REVIEW (OUTPATIENT)
Age: 84
End: 2019-07-24

## 2019-07-24 LAB
ALBUMIN SERPL ELPH-MCNC: 3.4 G/DL — SIGNIFICANT CHANGE UP (ref 3.3–5)
ALP SERPL-CCNC: 145 U/L — HIGH (ref 40–120)
ALT FLD-CCNC: 114 U/L — HIGH (ref 10–45)
ANION GAP SERPL CALC-SCNC: 10 MMOL/L — SIGNIFICANT CHANGE UP (ref 5–17)
AST SERPL-CCNC: 77 U/L — HIGH (ref 10–40)
BILIRUB SERPL-MCNC: 0.3 MG/DL — SIGNIFICANT CHANGE UP (ref 0.2–1.2)
BLD GP AB SCN SERPL QL: NEGATIVE — SIGNIFICANT CHANGE UP
BUN SERPL-MCNC: 4 MG/DL — LOW (ref 7–23)
CALCIUM SERPL-MCNC: 8.8 MG/DL — SIGNIFICANT CHANGE UP (ref 8.4–10.5)
CHLORIDE SERPL-SCNC: 103 MMOL/L — SIGNIFICANT CHANGE UP (ref 96–108)
CO2 SERPL-SCNC: 30 MMOL/L — SIGNIFICANT CHANGE UP (ref 22–31)
CREAT SERPL-MCNC: 0.55 MG/DL — SIGNIFICANT CHANGE UP (ref 0.5–1.3)
GLUCOSE SERPL-MCNC: 93 MG/DL — SIGNIFICANT CHANGE UP (ref 70–99)
HCT VFR BLD CALC: 41.9 % — SIGNIFICANT CHANGE UP (ref 34.5–45)
HGB BLD-MCNC: 12.9 G/DL — SIGNIFICANT CHANGE UP (ref 11.5–15.5)
MAGNESIUM SERPL-MCNC: 1.9 MG/DL — SIGNIFICANT CHANGE UP (ref 1.6–2.6)
MCHC RBC-ENTMCNC: 27.9 PG — SIGNIFICANT CHANGE UP (ref 27–34)
MCHC RBC-ENTMCNC: 30.8 GM/DL — LOW (ref 32–36)
MCV RBC AUTO: 90.7 FL — SIGNIFICANT CHANGE UP (ref 80–100)
NRBC # BLD: 0 /100 WBCS — SIGNIFICANT CHANGE UP (ref 0–0)
PHOSPHATE SERPL-MCNC: 2.5 MG/DL — SIGNIFICANT CHANGE UP (ref 2.5–4.5)
PLATELET # BLD AUTO: 230 K/UL — SIGNIFICANT CHANGE UP (ref 150–400)
POTASSIUM SERPL-MCNC: 3.6 MMOL/L — SIGNIFICANT CHANGE UP (ref 3.5–5.3)
POTASSIUM SERPL-SCNC: 3.6 MMOL/L — SIGNIFICANT CHANGE UP (ref 3.5–5.3)
PROT SERPL-MCNC: 6.2 G/DL — SIGNIFICANT CHANGE UP (ref 6–8.3)
RBC # BLD: 4.62 M/UL — SIGNIFICANT CHANGE UP (ref 3.8–5.2)
RBC # FLD: 13.4 % — SIGNIFICANT CHANGE UP (ref 10.3–14.5)
RH IG SCN BLD-IMP: POSITIVE — SIGNIFICANT CHANGE UP
SODIUM SERPL-SCNC: 143 MMOL/L — SIGNIFICANT CHANGE UP (ref 135–145)
WBC # BLD: 8.87 K/UL — SIGNIFICANT CHANGE UP (ref 3.8–10.5)
WBC # FLD AUTO: 8.87 K/UL — SIGNIFICANT CHANGE UP (ref 3.8–10.5)

## 2019-07-24 PROCEDURE — 49585: CPT | Mod: GC

## 2019-07-24 PROCEDURE — S2900 ROBOTIC SURGICAL SYSTEM: CPT | Mod: NC,GC

## 2019-07-24 PROCEDURE — 47562 LAPAROSCOPIC CHOLECYSTECTOMY: CPT | Mod: GC

## 2019-07-24 RX ORDER — ONDANSETRON 8 MG/1
4 TABLET, FILM COATED ORAL EVERY 6 HOURS
Refills: 0 | Status: DISCONTINUED | OUTPATIENT
Start: 2019-07-24 | End: 2019-07-25

## 2019-07-24 RX ORDER — ENOXAPARIN SODIUM 100 MG/ML
40 INJECTION SUBCUTANEOUS EVERY 24 HOURS
Refills: 0 | Status: DISCONTINUED | OUTPATIENT
Start: 2019-07-24 | End: 2019-07-24

## 2019-07-24 RX ORDER — KETOROLAC TROMETHAMINE 30 MG/ML
15 SYRINGE (ML) INJECTION ONCE
Refills: 0 | Status: DISCONTINUED | OUTPATIENT
Start: 2019-07-24 | End: 2019-07-24

## 2019-07-24 RX ORDER — LEVOTHYROXINE SODIUM 125 MCG
25 TABLET ORAL AT BEDTIME
Refills: 0 | Status: DISCONTINUED | OUTPATIENT
Start: 2019-07-24 | End: 2019-07-24

## 2019-07-24 RX ORDER — PANTOPRAZOLE SODIUM 20 MG/1
20 TABLET, DELAYED RELEASE ORAL ONCE
Refills: 0 | Status: DISCONTINUED | OUTPATIENT
Start: 2019-07-24 | End: 2019-07-24

## 2019-07-24 RX ORDER — POTASSIUM PHOSPHATE, MONOBASIC POTASSIUM PHOSPHATE, DIBASIC 236; 224 MG/ML; MG/ML
15 INJECTION, SOLUTION INTRAVENOUS ONCE
Refills: 0 | Status: COMPLETED | OUTPATIENT
Start: 2019-07-24 | End: 2019-07-24

## 2019-07-24 RX ORDER — LANOLIN ALCOHOL/MO/W.PET/CERES
5 CREAM (GRAM) TOPICAL AT BEDTIME
Refills: 0 | Status: DISCONTINUED | OUTPATIENT
Start: 2019-07-24 | End: 2019-07-25

## 2019-07-24 RX ORDER — LEVOTHYROXINE SODIUM 125 MCG
25 TABLET ORAL AT BEDTIME
Refills: 0 | Status: DISCONTINUED | OUTPATIENT
Start: 2019-07-24 | End: 2019-07-25

## 2019-07-24 RX ORDER — ACETAMINOPHEN 500 MG
325 TABLET ORAL EVERY 6 HOURS
Refills: 0 | Status: DISCONTINUED | OUTPATIENT
Start: 2019-07-24 | End: 2019-07-25

## 2019-07-24 RX ORDER — BUPIVACAINE 13.3 MG/ML
20 INJECTION, SUSPENSION, LIPOSOMAL INFILTRATION ONCE
Refills: 0 | Status: DISCONTINUED | OUTPATIENT
Start: 2019-07-24 | End: 2019-07-24

## 2019-07-24 RX ORDER — SODIUM CHLORIDE 9 MG/ML
1000 INJECTION, SOLUTION INTRAVENOUS
Refills: 0 | Status: DISCONTINUED | OUTPATIENT
Start: 2019-07-24 | End: 2019-07-25

## 2019-07-24 RX ORDER — ENOXAPARIN SODIUM 100 MG/ML
40 INJECTION SUBCUTANEOUS EVERY 24 HOURS
Refills: 0 | Status: DISCONTINUED | OUTPATIENT
Start: 2019-07-25 | End: 2019-07-25

## 2019-07-24 RX ORDER — PANTOPRAZOLE SODIUM 20 MG/1
40 TABLET, DELAYED RELEASE ORAL ONCE
Refills: 0 | Status: COMPLETED | OUTPATIENT
Start: 2019-07-24 | End: 2019-07-24

## 2019-07-24 RX ORDER — POTASSIUM CHLORIDE 20 MEQ
10 PACKET (EA) ORAL
Refills: 0 | Status: COMPLETED | OUTPATIENT
Start: 2019-07-24 | End: 2019-07-24

## 2019-07-24 RX ADMIN — Medication 100 MILLIGRAM(S): at 03:17

## 2019-07-24 RX ADMIN — Medication 25 MICROGRAM(S): at 22:06

## 2019-07-24 RX ADMIN — Medication 325 MILLIGRAM(S): at 20:00

## 2019-07-24 RX ADMIN — POTASSIUM PHOSPHATE, MONOBASIC POTASSIUM PHOSPHATE, DIBASIC 63.75 MILLIMOLE(S): 236; 224 INJECTION, SOLUTION INTRAVENOUS at 14:54

## 2019-07-24 RX ADMIN — CEFTRIAXONE 100 MILLIGRAM(S): 500 INJECTION, POWDER, FOR SOLUTION INTRAMUSCULAR; INTRAVENOUS at 09:01

## 2019-07-24 RX ADMIN — Medication 5 MILLIGRAM(S): at 21:58

## 2019-07-24 RX ADMIN — Medication 100 MILLIEQUIVALENT(S): at 13:22

## 2019-07-24 RX ADMIN — ENOXAPARIN SODIUM 40 MILLIGRAM(S): 100 INJECTION SUBCUTANEOUS at 09:27

## 2019-07-24 RX ADMIN — PANTOPRAZOLE SODIUM 40 MILLIGRAM(S): 20 TABLET, DELAYED RELEASE ORAL at 14:54

## 2019-07-24 RX ADMIN — Medication 15 MILLIGRAM(S): at 12:30

## 2019-07-24 RX ADMIN — Medication 100 MILLIEQUIVALENT(S): at 11:20

## 2019-07-24 RX ADMIN — Medication 100 MILLIEQUIVALENT(S): at 12:18

## 2019-07-24 RX ADMIN — Medication 100 MILLIGRAM(S): at 09:52

## 2019-07-24 RX ADMIN — Medication 15 MILLIGRAM(S): at 11:41

## 2019-07-24 NOTE — DIETITIAN INITIAL EVALUATION ADULT. - ENERGY NEEDS
Height: 5'4" Weight: 112lbs, IBW 120lbs+/-10%, %IBW 93%, BMI 45.2  ABW used for calculations as pt between % of IBW.   Nutrient needs based on [facility] standards of care for maintenance in older adults.   Needs adjusted for age and pre-op/post-op

## 2019-07-24 NOTE — BRIEF OPERATIVE NOTE - OPERATION/FINDINGS
Robotic assisted cholecystectomy. Scarlet cutdown on umbilicus. Port placement under direct visualization. Robot docked. Gallbladder large, with evident stones, ICG imaging with evidence of flow through gallbladder. Dissection performed with robotic hook. Critical view of safety obtained. LARGE clip applier to cystic duct. And Medium-Large clip applier to cystic artery. Dissection carried gallbladder off liver bed. Hemostasis achieved. Endocatch used for specimen. Closure of fascia with MAXON suture. Monocryl for skin

## 2019-07-24 NOTE — DIETITIAN INITIAL EVALUATION ADULT. - ADD RECOMMEND
1. Recommend advancing to clears >> low fat once medically feasible 2. Monitor for s/s intolerance 3. Encourage lean protein options at meals.

## 2019-07-24 NOTE — DIETITIAN INITIAL EVALUATION ADULT. - OTHER INFO
85yo F w/ PMH dementia, HTN, HLD, hypothyroidism a/w choledocholithiasis s/p ERCP w/ GI. Pt is pre-op for lap hang today (7/24). Pt seen in room, resting in bed w/ daughter at bedside. Currently NPO after MN for OR, previously on a kosher, clear liquid diet. Denies N/V, last BM 7/24. Denies wt changes PTA. NKFA or dietary restrictions. Discussed likely diet advancement process post-op to Clears >> Low fat as tolerated. RD to follow.

## 2019-07-24 NOTE — PRE-OP CHECKLIST - SELECT TESTS ORDERED
INR/CXR/CMP/CBC/PT/PTT/Type and Screen/Hepatic Function CXR/INR/CBC/PT/PTT/EKG/CMP/Type and Screen/Hepatic Function

## 2019-07-24 NOTE — PACU DISCHARGE NOTE - COMMENTS
alert baseline confusion. Moving all extremities. Med for abd pain Lap sites intact with scant serosanginous drainage. Has not voided No overt discomfort. Family at bedside. Report given to unit nurse for room 820, return Impending transfer back to room in bed, on cardiac monitor.

## 2019-07-25 ENCOUNTER — TRANSCRIPTION ENCOUNTER (OUTPATIENT)
Age: 84
End: 2019-07-25

## 2019-07-25 VITALS — TEMPERATURE: 99 F

## 2019-07-25 LAB
ALBUMIN SERPL ELPH-MCNC: 3.2 G/DL — LOW (ref 3.3–5)
ALP SERPL-CCNC: 117 U/L — SIGNIFICANT CHANGE UP (ref 40–120)
ALT FLD-CCNC: 90 U/L — HIGH (ref 10–45)
ANION GAP SERPL CALC-SCNC: 12 MMOL/L — SIGNIFICANT CHANGE UP (ref 5–17)
AST SERPL-CCNC: 57 U/L — HIGH (ref 10–40)
BILIRUB SERPL-MCNC: 0.3 MG/DL — SIGNIFICANT CHANGE UP (ref 0.2–1.2)
BUN SERPL-MCNC: 7 MG/DL — SIGNIFICANT CHANGE UP (ref 7–23)
CALCIUM SERPL-MCNC: 8.8 MG/DL — SIGNIFICANT CHANGE UP (ref 8.4–10.5)
CHLORIDE SERPL-SCNC: 101 MMOL/L — SIGNIFICANT CHANGE UP (ref 96–108)
CO2 SERPL-SCNC: 26 MMOL/L — SIGNIFICANT CHANGE UP (ref 22–31)
CREAT SERPL-MCNC: 0.61 MG/DL — SIGNIFICANT CHANGE UP (ref 0.5–1.3)
CULTURE RESULTS: SIGNIFICANT CHANGE UP
CULTURE RESULTS: SIGNIFICANT CHANGE UP
GLUCOSE SERPL-MCNC: 113 MG/DL — HIGH (ref 70–99)
HCT VFR BLD CALC: 38.1 % — SIGNIFICANT CHANGE UP (ref 34.5–45)
HGB BLD-MCNC: 12 G/DL — SIGNIFICANT CHANGE UP (ref 11.5–15.5)
MAGNESIUM SERPL-MCNC: 1.7 MG/DL — SIGNIFICANT CHANGE UP (ref 1.6–2.6)
MCHC RBC-ENTMCNC: 28.6 PG — SIGNIFICANT CHANGE UP (ref 27–34)
MCHC RBC-ENTMCNC: 31.5 GM/DL — LOW (ref 32–36)
MCV RBC AUTO: 90.7 FL — SIGNIFICANT CHANGE UP (ref 80–100)
NRBC # BLD: 0 /100 WBCS — SIGNIFICANT CHANGE UP (ref 0–0)
PHOSPHATE SERPL-MCNC: 4.5 MG/DL — SIGNIFICANT CHANGE UP (ref 2.5–4.5)
PLATELET # BLD AUTO: 195 K/UL — SIGNIFICANT CHANGE UP (ref 150–400)
POTASSIUM SERPL-MCNC: 4.1 MMOL/L — SIGNIFICANT CHANGE UP (ref 3.5–5.3)
POTASSIUM SERPL-SCNC: 4.1 MMOL/L — SIGNIFICANT CHANGE UP (ref 3.5–5.3)
PROT SERPL-MCNC: 5.7 G/DL — LOW (ref 6–8.3)
RBC # BLD: 4.2 M/UL — SIGNIFICANT CHANGE UP (ref 3.8–5.2)
RBC # FLD: 13.4 % — SIGNIFICANT CHANGE UP (ref 10.3–14.5)
SODIUM SERPL-SCNC: 139 MMOL/L — SIGNIFICANT CHANGE UP (ref 135–145)
SPECIMEN SOURCE: SIGNIFICANT CHANGE UP
SPECIMEN SOURCE: SIGNIFICANT CHANGE UP
WBC # BLD: 9.34 K/UL — SIGNIFICANT CHANGE UP (ref 3.8–10.5)
WBC # FLD AUTO: 9.34 K/UL — SIGNIFICANT CHANGE UP (ref 3.8–10.5)

## 2019-07-25 RX ORDER — MAGNESIUM SULFATE 500 MG/ML
1 VIAL (ML) INJECTION ONCE
Refills: 0 | Status: COMPLETED | OUTPATIENT
Start: 2019-07-25 | End: 2019-07-25

## 2019-07-25 RX ORDER — IBUPROFEN 200 MG
1 TABLET ORAL
Qty: 20 | Refills: 0
Start: 2019-07-25 | End: 2019-07-29

## 2019-07-25 RX ORDER — DOCUSATE SODIUM 100 MG
1 CAPSULE ORAL
Qty: 5 | Refills: 0
Start: 2019-07-25 | End: 2019-07-29

## 2019-07-25 RX ADMIN — Medication 325 MILLIGRAM(S): at 15:03

## 2019-07-25 RX ADMIN — ENOXAPARIN SODIUM 40 MILLIGRAM(S): 100 INJECTION SUBCUTANEOUS at 06:45

## 2019-07-25 RX ADMIN — Medication 100 GRAM(S): at 11:41

## 2019-07-25 RX ADMIN — Medication 325 MILLIGRAM(S): at 06:49

## 2019-07-25 NOTE — DISCHARGE NOTE PROVIDER - NSDCCPTREATMENT_GEN_ALL_CORE_FT
PRINCIPAL PROCEDURE  Procedure: Robot-assisted laparoscopic cholecystectomy  Findings and Treatment:

## 2019-07-25 NOTE — PROGRESS NOTE ADULT - REASON FOR ADMISSION
choledocholithiasis

## 2019-07-25 NOTE — DISCHARGE NOTE PROVIDER - NSDCCPCAREPLAN_GEN_ALL_CORE_FT
PRINCIPAL DISCHARGE DIAGNOSIS  Diagnosis: Cholelithiasis  Assessment and Plan of Treatment:       SECONDARY DISCHARGE DIAGNOSES  Diagnosis: Choledocholithiasis with obstruction  Assessment and Plan of Treatment:

## 2019-07-25 NOTE — PROGRESS NOTE ADULT - ASSESSMENT
84y F with a PMHx of dementia, HTN, HLD, hypothyroidism presents with sudden onset of epigastric abdominal pain 2/2 choledocholithiasis with cholecystitis. GI consulted for ERCP.    1. Epigastric pain - Patient with choledocholithiasis s/p ERCP with sphincterotomy and removal of 1 pigmented stone seen on intra-ERCP fluoroscopy. Patient tolerated procedure without complications.   - Advance diet as tolerated  - Further care per primary surgical team    Recommendations discussed with primary team  Case discussed with attending physician  Please recall as needed with any gastroenterological questions
84F PMHx dementia, HTN, HLD, hypothyroidism a/w choledocholithiasis.    NPO for ERCP today  pain/nausea control  home meds as appropriate  IS/OOB  IVF  ceftriaxone, Flagyl  SCDs, lovenox
84F PMHx dementia, HTN, HLD, hypothyroidism a/w choledocholithiasis.    Plan on ERCP Monday  pain/nausea control  home meds as appropriate  IS/OOB  CLD, IVF  ceftriaxone, Flagyl  SCDs, lovenox    NPO mn  pre-op orders tonight
84F PMHx dementia, HTN, HLD, hypothyroidism a/w choledocholithiasis.    Plan on ERCP Monday; discussed with GI today  pain/nausea control  home meds as appropriate  IS/OOB  CLD, IVF  ceftriaxone, Flagyl  SCDs, lovenox
84F PMHx dementia, HTN, HLD, hypothyroidism a/w choledocholithiasis.  s/p ERCP    for OR today lap hang 7/24  pain/nausea control  home meds as appropriate  IS/OOB  NPO since MN  ceftriaxone, Flagyl  SCDs, lovenox
84F PMHx dementia, HTN, HLD, hypothyroidism a/w choledocholithiasis.  s/p ERCP    pain/nausea control  synthroid (IV), convert to PO when possible  IS/OOB  CLD  ceftriaxone, Flagyl  SCDs, lovenox    7/24 s/p robotic laparoscopic cholecystectomy
84F PMHx dementia, HTN, HLD, hypothyroidism a/w choledocholithiasis.  s/p ERCP 7/22     pain/nausea control  home meds as appropriate  IS/OOB  CLD, IVF  ceftriaxone, Flagyl  SCDs, lovenox
84F PMHx dementia, HTN, HLD, hypothyroidism presents with sudden onset of epigastric abdominal pain    Pain abdomen: choledocholithiasis with gallstones  -no fever, vss  -Bili/ALP normal, AST//58  -RUQ US: no cholecystitis, no IHB dilatation, CBD normal at 6 mm but stones seen in CBD, gall stones +  -currently no e/o cholangitis, pt denies any pain abdomen  -will plan for ERCP on monday  -diet as tolerated  -npo after midnight Sunday night  -c/w abx for now  -trend LFTs, check am INR/PT , notify GI fellow on call if nay change in clinical status      discussed with Dr Huang
A/P: 84y Female planned for above procedure  NPO past midnight, except medications  IVF  Pain/nausea control  AM labs  Consent in chart  cefTRIAXone   IVPB  metroNIDAZOLE  IVPB

## 2019-07-25 NOTE — DISCHARGE NOTE NURSING/CASE MANAGEMENT/SOCIAL WORK - NSDCDPATPORTLINK_GEN_ALL_CORE
You can access the [facility], offered by [facility], by registering with the following website: [email or web address] [facility]

## 2019-07-25 NOTE — DISCHARGE NOTE PROVIDER - CARE PROVIDER_API CALL
Tahira Restrepo)  Surgery  [address]  [place], [place] [address]  Phone: [phone]  Fax: [phone]  Follow Up Time:

## 2019-07-25 NOTE — DISCHARGE NOTE PROVIDER - HOSPITAL COURSE
84F PMHx dementia, HTN, HLD, hypothyroidism presents with sudden onset of epigastric abdominal pain, nonradiating, no aggravating or alleviating factors, since 1am this morning, associated with mild nausea, no vomiting. Unsure when she last passed flatus; last bowel movement yesterday. No fevers, chills, chest pain, dyspnea, diarrhea, constipation, obstipation or calf pain. Has had symptoms like this intermittently over the last few months, usually alleviated with seltzer. Does not remember her last colonoscopy.        US abdo showed Cholelithiasis without acute cholecystitis. Choledocholithiasis with     normal diameter CBD measuring up to 7 mm. Patient underwent on 7/24 s/p robotic laparoscopic cholecystectomy        Patient's post-operative course was uncomplicated. Diet was advanced as tolerated and pain was well controlled on medication. On day of discharge, pt deemed stable and ready to return home with plan to follow up as an outpatient.

## 2019-07-25 NOTE — DISCHARGE NOTE PROVIDER - NSDCFUADDINST_GEN_ALL_CORE_FT
Activity: No heavy lifting or strenuous activity. Walk as tolerated. Physical therapy per routine. Wound Care: Wash your wound Daily with soap and water daily. Pat dry. You may shower. No baths, hot tubs or swimming until approved by your surgeon. Follow up 1 - 2 weeks after discharge. Call office for appointment.  Office: [phone].  Call office for fever >101.5 or redness or drainage from wound.

## 2019-07-25 NOTE — PROGRESS NOTE ADULT - SUBJECTIVE AND OBJECTIVE BOX
GASTROENTEROLOGY PROGRESS NOTE  Patient seen and examined at bedside. Patient denies fevers, chills, abdominal pain, nausea, vomiting. Tolerating her clear liquid diet without pain.    PERTINENT REVIEW OF SYSTEMS:  CONSTITUTIONAL: No weakness, fevers or chills  HEENT: No visual changes; No vertigo or throat pain   GASTROINTESTINAL: As above  NEUROLOGICAL: No numbness or weakness  SKIN: No itching, burning, rashes, or lesions     Allergies    No Known Allergies    Intolerances      MEDICATIONS:  MEDICATIONS  (STANDING):  cefTRIAXone   IVPB 1000 milliGRAM(s) IV Intermittent every 24 hours  enoxaparin Injectable 40 milliGRAM(s) SubCutaneous every 24 hours  lactated ringers. 1000 milliLiter(s) (90 mL/Hr) IV Continuous <Continuous>  magnesium sulfate  IVPB 1 Gram(s) IV Intermittent once  melatonin 5 milliGRAM(s) Oral at bedtime  metroNIDAZOLE  IVPB 500 milliGRAM(s) IV Intermittent every 8 hours    MEDICATIONS  (PRN):  HYDROmorphone  Injectable 0.5 milliGRAM(s) IV Push every 6 hours PRN Severe Pain (7 - 10)  ibuprofen  Tablet. 200 milliGRAM(s) Oral every 6 hours PRN Mild Pain (1 - 3), Moderate Pain (4 - 6)  ondansetron Injectable 4 milliGRAM(s) IV Push every 6 hours PRN Nausea    Vital Signs Last 24 Hrs  T(C): 36.9 (23 Jul 2019 04:48), Max: 37.2 (22 Jul 2019 10:19)  T(F): 98.4 (23 Jul 2019 04:48), Max: 98.9 (22 Jul 2019 10:19)  HR: 62 (23 Jul 2019 08:35) (43 - 66)  BP: 148/65 (23 Jul 2019 08:35) (126/58 - 149/72)  BP(mean): 94 (23 Jul 2019 08:35) (90 - 99)  RR: 18 (23 Jul 2019 08:35) (16 - 18)  SpO2: 94% (23 Jul 2019 08:35) (90% - 95%)    07-22 @ 07:01  -  07-23 @ 07:00  --------------------------------------------------------  IN: 1090 mL / OUT: 175 mL / NET: 915 mL      PHYSICAL EXAM:    General: Thin, elderly female in NAD  HEENT: MMM, conjunctiva and sclera clear  Gastrointestinal: Soft non-tender non-distended; Normal bowel sounds; No hepatosplenomegaly. No rebound or guarding  Skin: Warm and dry. No obvious rash    LABS:                        12.2   6.96  )-----------( 198      ( 23 Jul 2019 06:57 )             39.4     07-23    144  |  106  |  9   ----------------------------<  79  4.1   |  29  |  0.69    Ca    8.8      23 Jul 2019 06:57  Phos  2.9     07-23  Mg     1.7     07-23    TPro  5.5<L>  /  Alb  3.2<L>  /  TBili  0.2  /  DBili  x   /  AST  58<H>  /  ALT  106<H>  /  AlkPhos  144<H>  07-23    PT/INR - ( 21 Jul 2019 15:31 )   PT: 11.7 sec;   INR: 1.03          PTT - ( 21 Jul 2019 15:31 )  PTT:34.8 sec        RADIOLOGY & ADDITIONAL STUDIES:  Reviewed
PRE OPERATIVE NOTE    Pre-op Diagnosis: cholecystitis  Procedure: lap cholecystectomy  Surgeon: Tahira Restrepo                           12.2   6.96  )-----------( 198      ( 23 Jul 2019 06:57 )             39.4     07-23    144  |  106  |  9   ----------------------------<  79  4.1   |  29  |  0.69    Ca    8.8      23 Jul 2019 06:57  Phos  2.9     07-23  Mg     1.7     07-23    TPro  5.5<L>  /  Alb  3.2<L>  /  TBili  0.2  /  DBili  x   /  AST  58<H>  /  ALT  106<H>  /  AlkPhos  144<H>  07-23    LIVER FUNCTIONS - ( 23 Jul 2019 06:57 )  Alb: 3.2 g/dL / Pro: 5.5 g/dL / ALK PHOS: 144 U/L / ALT: 106 U/L / AST: 58 U/L / GGT: x           PT/INR - ( 23 Jul 2019 12:45 )   PT: 12.1 sec;   INR: 1.07          PTT - ( 23 Jul 2019 12:45 )  PTT:35.5 sec    CAPILLARY BLOOD GLUCOSE          Type & Screen: done  CXR: done  EKG: done  Echocardiogram: N/A    A/P: 84y Female planned for above procedure  NPO past midnight, except medications  IVF  Pain/nausea control  AM labs  Consent in chart  cefTRIAXone   IVPB  metroNIDAZOLE  IVPB
Procedure: Laparoscopic cholecystectomy  Surgeon: Kaye    S: Pt has no complaints. Denies CP, SOB, PATEL, calf tenderness. Pain controlled with medication. Patient is voiding, tolerating clear liquid diet. No BF.     O:  T(C): 36.3 (07-24-19 @ 18:52), Max: 36.3 (07-24-19 @ 18:52)  T(F): 97.4 (07-24-19 @ 18:52), Max: 97.4 (07-24-19 @ 18:52)  HR: 70 (07-24-19 @ 20:07) (58 - 70)  BP: 121/70 (07-24-19 @ 20:07) (119/59 - 138/64)  RR: 26 (07-24-19 @ 20:07) (15 - 26)  SpO2: 95% (07-24-19 @ 20:07) (92% - 95%)  Wt(kg): --                        12.9   8.87  )-----------( 230      ( 24 Jul 2019 06:17 )             41.9     07-24    143  |  103  |  4<L>  ----------------------------<  93  3.6   |  30  |  0.55    Ca    8.8      24 Jul 2019 06:17  Phos  2.5     07-24  Mg     1.9     07-24    TPro  6.2  /  Alb  3.4  /  TBili  0.3  /  DBili  x   /  AST  77<H>  /  ALT  114<H>  /  AlkPhos  145<H>  07-24      Gen: NAD, resting comfortably in bed  C/V: NSR  Pulm: Nonlabored breathing, no respiratory distress  Abd: soft, NT/ND Incision: c/d/i  Extrem: WWP, no calf edema, SCDs in place      A/P: 84yFemale s/p laparoscopic cholecystectomy  Diet: CLD  IVF: LR  Pain/nausea control  DVT ppx: lovenox/scd
Pt seen and examined at bedside.    PERTINENT REVIEW OF SYSTEMS:  CONSTITUTIONAL: No weakness, fevers or chills  HEENT: No visual changes; No vertigo or throat pain   GASTROINTESTINAL: No nausea, vomiting, or hematemesis; No diarrhea or constipation. No melena or hematochezia.  NEUROLOGICAL: No numbness or weakness  SKIN: No itching, burning, rashes, or lesions     Allergies    No Known Allergies    Intolerances      MEDICATIONS:  MEDICATIONS  (STANDING):  acetaminophen  IVPB .. 1000 milliGRAM(s) IV Intermittent once  cefTRIAXone   IVPB 1000 milliGRAM(s) IV Intermittent every 24 hours  enoxaparin Injectable 40 milliGRAM(s) SubCutaneous every 24 hours  lactated ringers. 1000 milliLiter(s) (90 mL/Hr) IV Continuous <Continuous>  melatonin 5 milliGRAM(s) Oral at bedtime  metroNIDAZOLE  IVPB 500 milliGRAM(s) IV Intermittent every 8 hours  scopolamine   Patch 1 Patch Transdermal once    MEDICATIONS  (PRN):  HYDROmorphone  Injectable 0.5 milliGRAM(s) IV Push every 6 hours PRN Severe Pain (7 - 10)  ondansetron Injectable 4 milliGRAM(s) IV Push every 6 hours PRN Nausea    Vital Signs Last 24 Hrs  T(C): 36.9 (21 Jul 2019 09:42), Max: 36.9 (21 Jul 2019 09:42)  T(F): 98.4 (21 Jul 2019 09:42), Max: 98.4 (21 Jul 2019 09:42)  HR: 48 (21 Jul 2019 05:09) (48 - 74)  BP: 114/59 (21 Jul 2019 05:09) (111/59 - 127/59)  BP(mean): 82 (21 Jul 2019 05:09) (81 - 91)  RR: 16 (21 Jul 2019 05:09) (16 - 18)  SpO2: 99% (21 Jul 2019 05:09) (95% - 99%)    07-20 @ 07:01  -  07-21 @ 07:00  --------------------------------------------------------  IN: 2620 mL / OUT: 1400 mL / NET: 1220 mL    07-21 @ 07:01 - 07-21 @ 12:11  --------------------------------------------------------  IN: 360 mL / OUT: 0 mL / NET: 360 mL      PHYSICAL EXAM:    General: Well developed; well nourished; in no acute distress  HEENT: MMM, conjunctiva and sclera clear  Gastrointestinal: Soft non-tender non-distended; Normal bowel sounds; No hepatosplenomegaly. No rebound or guarding  Skin: Warm and dry. No obvious rash    LABS:                        12.6   5.55  )-----------( 206      ( 21 Jul 2019 11:15 )             41.0     07-21    140  |  103  |  8   ----------------------------<  100<H>  3.9   |  27  |  0.61    Ca    8.8      21 Jul 2019 11:15    TPro  6.7  /  Alb  4.1  /  TBili  0.3  /  DBili  x   /  AST  126<H>  /  ALT  58<H>  /  AlkPhos  120  07-20    PT/INR - ( 20 Jul 2019 02:16 )   PT: 10.8 sec;   INR: 0.96          PTT - ( 20 Jul 2019 02:16 )  PTT:28.2 sec                  Culture - Blood (collected 20 Jul 2019 08:19)  Source: .Blood Blood  Preliminary Report (21 Jul 2019 09:00):    No growth at 1 day.    Culture - Blood (collected 20 Jul 2019 08:19)  Source: .Blood Blood  Preliminary Report (21 Jul 2019 09:00):    No growth at 1 day.      RADIOLOGY & ADDITIONAL STUDIES:
STATUS POST:  ERCP 7/22     SUBJECTIVE: Patient seen and examined bedside by senior resident. pt resting comfortably in bed. noted that pt s/p ERCP yday, and that family still deciding if they want to proceed with lap hang during this adm or as outpt. patient oliver CLD. no other complaints. no n/v, no pain.     cefTRIAXone   IVPB 1000 milliGRAM(s) IV Intermittent every 24 hours  enoxaparin Injectable 40 milliGRAM(s) SubCutaneous every 24 hours  metroNIDAZOLE  IVPB 500 milliGRAM(s) IV Intermittent every 8 hours      Vital Signs Last 24 Hrs  T(C): 36.9 (23 Jul 2019 04:48), Max: 37.2 (22 Jul 2019 10:19)  T(F): 98.4 (23 Jul 2019 04:48), Max: 98.9 (22 Jul 2019 10:19)  HR: 54 (23 Jul 2019 06:09) (43 - 70)  BP: 138/65 (23 Jul 2019 06:09) (126/58 - 174/73)  BP(mean): 91 (22 Jul 2019 16:05) (90 - 105)  RR: 16 (23 Jul 2019 06:09) (16 - 17)  SpO2: 92% (23 Jul 2019 06:09) (90% - 95%)  I&O's Detail    22 Jul 2019 07:01  -  23 Jul 2019 07:00  --------------------------------------------------------  IN:    IV PiggyBack: 100 mL    lactated ringers.: 990 mL  Total IN: 1090 mL    OUT:    Voided: 175 mL  Total OUT: 175 mL    Total NET: 915 mL          General: NAD, resting comfortably in bed  C/V: NSR  Pulm: Nonlabored breathing, no respiratory distress  Abd: soft, NT/ND.  Extrem: WWP, no edema, SCDs in place        LABS:                        12.2   6.96  )-----------( 198      ( 23 Jul 2019 06:57 )             39.4     07-23    144  |  106  |  9   ----------------------------<  79  4.1   |  29  |  0.69    Ca    8.8      23 Jul 2019 06:57  Phos  2.9     07-23  Mg     1.7     07-23    TPro  5.5<L>  /  Alb  3.2<L>  /  TBili  0.2  /  DBili  x   /  AST  58<H>  /  ALT  106<H>  /  AlkPhos  144<H>  07-23    PT/INR - ( 21 Jul 2019 15:31 )   PT: 11.7 sec;   INR: 1.03          PTT - ( 21 Jul 2019 15:31 )  PTT:34.8 sec      RADIOLOGY & ADDITIONAL STUDIES:
SUBJECTIVE: Pain improved. No nausea or vomiting. Fever or chills. OOB to chair. Patient seen and examined bedside by chief resident.    cefTRIAXone   IVPB 1000 milliGRAM(s) IV Intermittent every 24 hours  enoxaparin Injectable 40 milliGRAM(s) SubCutaneous every 24 hours  metroNIDAZOLE  IVPB 500 milliGRAM(s) IV Intermittent every 8 hours    MEDICATIONS  (PRN):  HYDROmorphone  Injectable 0.5 milliGRAM(s) IV Push every 6 hours PRN Severe Pain (7 - 10)  ondansetron Injectable 4 milliGRAM(s) IV Push every 6 hours PRN Nausea      I&O's Detail    20 Jul 2019 07:01  -  20 Jul 2019 18:25  --------------------------------------------------------  IN:    IV PiggyBack: 150 mL    lactated ringers.: 810 mL  Total IN: 960 mL    OUT:    Voided: 400 mL  Total OUT: 400 mL    Total NET: 560 mL          Vital Signs Last 24 Hrs  T(C): 36.2 (20 Jul 2019 14:36), Max: 36.8 (20 Jul 2019 09:17)  T(F): 97.2 (20 Jul 2019 14:36), Max: 98.2 (20 Jul 2019 09:17)  HR: 74 (20 Jul 2019 16:20) (65 - 80)  BP: 127/59 (20 Jul 2019 16:20) (122/74 - 147/65)  BP(mean): 85 (20 Jul 2019 16:20) (85 - 93)  RR: 16 (20 Jul 2019 16:20) (16 - 19)  SpO2: 95% (20 Jul 2019 16:20) (95% - 99%)    General: NAD, resting comfortably in bed  C/V: NSR  Pulm: Nonlabored breathing, no respiratory distress  Abd: soft, NT/ND  Extrem: WWP, no edema, SCDs in place    LABS:                        12.4   11.65 )-----------( 209      ( 20 Jul 2019 02:16 )             39.6     07-20    143  |  102  |  21  ----------------------------<  143<H>  3.8   |  32<H>  |  0.97    Ca    9.3      20 Jul 2019 02:16    TPro  6.7  /  Alb  4.1  /  TBili  0.3  /  DBili  x   /  AST  126<H>  /  ALT  58<H>  /  AlkPhos  120  07-20    PT/INR - ( 20 Jul 2019 02:16 )   PT: 10.8 sec;   INR: 0.96          PTT - ( 20 Jul 2019 02:16 )  PTT:28.2 sec
SUBJECTIVE: Patient seen and examined bedside by chief resident. patient doing well. oliver CLD. no other complaints    enoxaparin Injectable 40 milliGRAM(s) SubCutaneous every 24 hours      Vital Signs Last 24 Hrs  T(C): 36.7 (25 Jul 2019 10:00), Max: 37.7 (24 Jul 2019 14:00)  T(F): 98.1 (25 Jul 2019 10:00), Max: 99.8 (24 Jul 2019 14:00)  HR: 74 (25 Jul 2019 08:30) (58 - 88)  BP: 140/60 (25 Jul 2019 08:30) (110/78 - 159/65)  BP(mean): 87 (25 Jul 2019 08:30) (78 - 105)  RR: 16 (25 Jul 2019 08:30) (14 - 26)  SpO2: 94% (25 Jul 2019 08:30) (92% - 97%)  I&O's Detail    24 Jul 2019 07:01  -  25 Jul 2019 07:00  --------------------------------------------------------  IN:    IV PiggyBack: 300 mL    lactated ringers.: 1050 mL    lactated ringers.: 600 mL  Total IN: 1950 mL    OUT:    Voided: 950 mL  Total OUT: 950 mL    Total NET: 1000 mL      25 Jul 2019 07:01  -  25 Jul 2019 10:21  --------------------------------------------------------  IN:    lactated ringers.: 75 mL  Total IN: 75 mL    OUT:  Total OUT: 0 mL    Total NET: 75 mL          General: NAD, resting comfortably in bed  C/V: NSR  Pulm: Nonlabored breathing, no respiratory distress  Abd: soft, ATTP. incision CDI.   Extrem: WWP, no edema, SCDs in place        LABS:                        12.0   9.34  )-----------( 195      ( 25 Jul 2019 06:23 )             38.1     07-25    139  |  101  |  7   ----------------------------<  113<H>  4.1   |  26  |  0.61    Ca    8.8      25 Jul 2019 06:23  Phos  4.5     07-25  Mg     1.7     07-25    TPro  5.7<L>  /  Alb  3.2<L>  /  TBili  0.3  /  DBili  x   /  AST  57<H>  /  ALT  90<H>  /  AlkPhos  117  07-25    PT/INR - ( 23 Jul 2019 12:45 )   PT: 12.1 sec;   INR: 1.07          PTT - ( 23 Jul 2019 12:45 )  PTT:35.5 sec      RADIOLOGY & ADDITIONAL STUDIES:
SUBJECTIVE: Patient seen and examined bedside by senior resident. c/o headache, we will give iv tylenol to try to relieve this. otherwise no issues other than abd pain. no n/v    cefTRIAXone   IVPB 1000 milliGRAM(s) IV Intermittent every 24 hours  enoxaparin Injectable 40 milliGRAM(s) SubCutaneous every 24 hours  metroNIDAZOLE  IVPB 500 milliGRAM(s) IV Intermittent every 8 hours      Vital Signs Last 24 Hrs  T(C): 36.9 (21 Jul 2019 09:42), Max: 36.9 (21 Jul 2019 09:42)  T(F): 98.4 (21 Jul 2019 09:42), Max: 98.4 (21 Jul 2019 09:42)  HR: 48 (21 Jul 2019 05:09) (48 - 74)  BP: 114/59 (21 Jul 2019 05:09) (111/59 - 127/59)  BP(mean): 82 (21 Jul 2019 05:09) (81 - 91)  RR: 16 (21 Jul 2019 05:09) (16 - 18)  SpO2: 99% (21 Jul 2019 05:09) (95% - 99%)  I&O's Detail    20 Jul 2019 07:01  -  21 Jul 2019 07:00  --------------------------------------------------------  IN:    IV PiggyBack: 250 mL    lactated ringers.: 1890 mL    Oral Fluid: 480 mL  Total IN: 2620 mL    OUT:    Voided: 1400 mL  Total OUT: 1400 mL    Total NET: 1220 mL      21 Jul 2019 07:01  -  21 Jul 2019 12:24  --------------------------------------------------------  IN:    Oral Fluid: 360 mL  Total IN: 360 mL    OUT:  Total OUT: 0 mL    Total NET: 360 mL          General: NAD, resting comfortably in bed  C/V: NSR  Pulm: Nonlabored breathing, no respiratory distress  Abd: soft, NT/ND. feels uncomfortable in RUQ, on superficial palpation  Extrem: WWP, no edema, SCDs in place        LABS:                        12.6   5.55  )-----------( 206      ( 21 Jul 2019 11:15 )             41.0     07-21    140  |  103  |  8   ----------------------------<  100<H>  3.9   |  27  |  0.61    Ca    8.8      21 Jul 2019 11:15    TPro  6.7  /  Alb  4.1  /  TBili  0.3  /  DBili  x   /  AST  126<H>  /  ALT  58<H>  /  AlkPhos  120  07-20    PT/INR - ( 20 Jul 2019 02:16 )   PT: 10.8 sec;   INR: 0.96          PTT - ( 20 Jul 2019 02:16 )  PTT:28.2 sec      RADIOLOGY & ADDITIONAL STUDIES:
SUBJECTIVE: Pt reports headache that did not respond to tylenol--states that ibuprofen typically works at home. Othwerwise no complaints. No nausea/vomiting. OOB. Patient seen and examined bedside by chief resident.    cefTRIAXone   IVPB 1000 milliGRAM(s) IV Intermittent every 24 hours  enoxaparin Injectable 40 milliGRAM(s) SubCutaneous every 24 hours  metroNIDAZOLE  IVPB 500 milliGRAM(s) IV Intermittent every 8 hours    MEDICATIONS  (PRN):  HYDROmorphone  Injectable 0.5 milliGRAM(s) IV Push every 6 hours PRN Severe Pain (7 - 10)  ibuprofen  Tablet. 200 milliGRAM(s) Oral every 6 hours PRN Mild Pain (1 - 3), Moderate Pain (4 - 6)  ondansetron Injectable 4 milliGRAM(s) IV Push every 6 hours PRN Nausea      I&O's Detail    21 Jul 2019 07:01  -  22 Jul 2019 07:00  --------------------------------------------------------  IN:    IV PiggyBack: 200 mL    lactated ringers.: 2070 mL    Oral Fluid: 830 mL  Total IN: 3100 mL    OUT:    Voided: 1100 mL  Total OUT: 1100 mL    Total NET: 2000 mL          Vital Signs Last 24 Hrs  T(C): 37.2 (22 Jul 2019 10:19), Max: 37.2 (21 Jul 2019 21:37)  T(F): 98.9 (22 Jul 2019 10:19), Max: 98.9 (21 Jul 2019 21:37)  HR: 70 (22 Jul 2019 08:24) (50 - 72)  BP: 174/73 (22 Jul 2019 08:24) (116/54 - 174/73)  BP(mean): 105 (22 Jul 2019 08:24) (85 - 105)  RR: 17 (22 Jul 2019 08:24) (16 - 20)  SpO2: 94% (22 Jul 2019 08:24) (90% - 98%)    General: NAD, resting comfortably in bed  C/V: NSR  Pulm: Nonlabored breathing, no respiratory distress  Abd: soft, NT/ND  Extrem: WWP, no edema, SCDs in place    LABS:                        12.7   5.26  )-----------( 213      ( 22 Jul 2019 07:12 )             41.6     07-22    140  |  104  |  9   ----------------------------<  88  3.7   |  28  |  0.67    Ca    8.7      22 Jul 2019 07:12  Phos  3.0     07-22  Mg     2.0     07-22    TPro  6.0  /  Alb  3.5  /  TBili  0.3  /  DBili  <0.2  /  AST  88<H>  /  ALT  150<H>  /  AlkPhos  165<H>  07-22    PT/INR - ( 21 Jul 2019 15:31 )   PT: 11.7 sec;   INR: 1.03          PTT - ( 21 Jul 2019 15:31 )  PTT:34.8 sec
SUBJECTIVE: Pt seen and examined at bedside with chief. Pt denies any complaints. Pain well controlled. Tolerating diet without N/V.    MEDICATIONS  (STANDING):  cefTRIAXone   IVPB 1000 milliGRAM(s) IV Intermittent every 24 hours  enoxaparin Injectable 40 milliGRAM(s) SubCutaneous every 24 hours  lactated ringers. 1000 milliLiter(s) (90 mL/Hr) IV Continuous <Continuous>  melatonin 5 milliGRAM(s) Oral at bedtime  metroNIDAZOLE  IVPB 500 milliGRAM(s) IV Intermittent every 8 hours    MEDICATIONS  (PRN):  HYDROmorphone  Injectable 0.5 milliGRAM(s) IV Push every 6 hours PRN Severe Pain (7 - 10)  ibuprofen  Tablet. 200 milliGRAM(s) Oral every 6 hours PRN Mild Pain (1 - 3), Moderate Pain (4 - 6)  ondansetron Injectable 4 milliGRAM(s) IV Push every 6 hours PRN Nausea      Vital Signs Last 24 Hrs  T(C): 36.9 (24 Jul 2019 05:02), Max: 37.4 (23 Jul 2019 21:24)  T(F): 98.5 (24 Jul 2019 05:02), Max: 99.4 (23 Jul 2019 21:24)  HR: 77 (24 Jul 2019 06:00) (60 - 77)  BP: 157/68 (24 Jul 2019 06:00) (110/55 - 157/68)  BP(mean): 98 (24 Jul 2019 06:00) (79 - 101)  RR: 18 (24 Jul 2019 06:00) (16 - 18)  SpO2: 94% (24 Jul 2019 06:00) (94% - 97%)    PHYSICAL EXAM:      Constitutional: A&Ox3    Respiratory: non labored breathing, no respiratory distress    Cardiovascular: NSR, RRR    Gastrointestinal: soft ND,NT                Genitourinary: VOIDING     Extremities: (-) edema                  I&O's Detail    23 Jul 2019 07:01  -  24 Jul 2019 07:00  --------------------------------------------------------  IN:    lactated ringers.: 1890 mL    Oral Fluid: 320 mL  Total IN: 2210 mL    OUT:    Voided: 350 mL  Total OUT: 350 mL    Total NET: 1860 mL          LABS:                        12.9   8.87  )-----------( 230      ( 24 Jul 2019 06:17 )             41.9     07-24    143  |  103  |  4<L>  ----------------------------<  93  3.6   |  30  |  0.55    Ca    8.8      24 Jul 2019 06:17  Phos  2.5     07-24  Mg     1.9     07-24    TPro  6.2  /  Alb  3.4  /  TBili  0.3  /  DBili  x   /  AST  77<H>  /  ALT  114<H>  /  AlkPhos  145<H>  07-24    PT/INR - ( 23 Jul 2019 12:45 )   PT: 12.1 sec;   INR: 1.07          PTT - ( 23 Jul 2019 12:45 )  PTT:35.5 sec      RADIOLOGY & ADDITIONAL STUDIES:

## 2019-07-31 LAB — SURGICAL PATHOLOGY STUDY: SIGNIFICANT CHANGE UP

## 2019-08-01 DIAGNOSIS — K42.9 UMBILICAL HERNIA WITHOUT OBSTRUCTION OR GANGRENE: ICD-10-CM

## 2019-08-01 DIAGNOSIS — F03.90 UNSPECIFIED DEMENTIA WITHOUT BEHAVIORAL DISTURBANCE: ICD-10-CM

## 2019-08-01 DIAGNOSIS — K80.45 CALCULUS OF BILE DUCT WITH CHRONIC CHOLECYSTITIS WITH OBSTRUCTION: ICD-10-CM

## 2019-08-01 DIAGNOSIS — E03.9 HYPOTHYROIDISM, UNSPECIFIED: ICD-10-CM

## 2019-08-01 DIAGNOSIS — I10 ESSENTIAL (PRIMARY) HYPERTENSION: ICD-10-CM

## 2019-08-01 PROBLEM — E78.5 HYPERLIPIDEMIA, UNSPECIFIED: Chronic | Status: ACTIVE | Noted: 2019-07-20

## 2019-08-02 ENCOUNTER — APPOINTMENT (OUTPATIENT)
Dept: PHARMACY | Facility: CLINIC | Age: 84
End: 2019-08-02
Payer: SELF-PAY

## 2019-08-02 ENCOUNTER — APPOINTMENT (OUTPATIENT)
Dept: OTOLARYNGOLOGY | Facility: CLINIC | Age: 84
End: 2019-08-02
Payer: MEDICARE

## 2019-08-02 DIAGNOSIS — H61.23 IMPACTED CERUMEN, BILATERAL: ICD-10-CM

## 2019-08-02 PROCEDURE — V5299A: CUSTOM | Mod: NC

## 2019-08-02 PROCEDURE — 99212 OFFICE O/P EST SF 10 MIN: CPT

## 2019-08-02 NOTE — PHYSICAL EXAM
[Normal] : tympanic membranes are normal in both ears [de-identified] : b copious cerumen impaction removed with suction

## 2019-08-02 NOTE — HISTORY OF PRESENT ILLNESS
[de-identified] : 83 yo woman with b aural fullness and hl - she ios a ha user and has h/o meniere's in the past. She is here  with her son. r ear is worse than l and she feels she also may have wax in her hearing aids for a few d

## 2019-08-12 ENCOUNTER — APPOINTMENT (OUTPATIENT)
Dept: SURGERY | Facility: CLINIC | Age: 84
End: 2019-08-12
Payer: MEDICARE

## 2019-08-12 VITALS
HEART RATE: 63 BPM | HEIGHT: 63 IN | TEMPERATURE: 97.3 F | OXYGEN SATURATION: 96 % | WEIGHT: 107.25 LBS | BODY MASS INDEX: 19 KG/M2 | SYSTOLIC BLOOD PRESSURE: 120 MMHG | DIASTOLIC BLOOD PRESSURE: 72 MMHG

## 2019-08-12 DIAGNOSIS — K42.9 UMBILICAL HERNIA W/OUT OBSTRUCTION OR GANGRENE: ICD-10-CM

## 2019-08-12 DIAGNOSIS — K80.70 CALCULUS OF GALLBLADDER AND BILE DUCT W/OUT CHOLECYSTITIS W/OUT OBSTRUCTION: ICD-10-CM

## 2019-08-12 DIAGNOSIS — Z86.39 PERSONAL HISTORY OF OTHER ENDOCRINE, NUTRITIONAL AND METABOLIC DISEASE: ICD-10-CM

## 2019-08-12 PROCEDURE — 99024 POSTOP FOLLOW-UP VISIT: CPT

## 2019-08-12 RX ORDER — AMLODIPINE BESYLATE 2.5 MG/1
2.5 TABLET ORAL
Refills: 0 | Status: ACTIVE | COMMUNITY

## 2019-08-12 RX ORDER — LEVOTHYROXINE SODIUM 50 UG/1
50 TABLET ORAL
Refills: 0 | Status: ACTIVE | COMMUNITY

## 2019-08-12 RX ORDER — ASPIRIN 81 MG
81 TABLET, DELAYED RELEASE (ENTERIC COATED) ORAL
Refills: 0 | Status: ACTIVE | COMMUNITY

## 2019-08-12 RX ORDER — ASCORBIC ACID 125 MG
TABLET,CHEWABLE ORAL
Refills: 0 | Status: ACTIVE | COMMUNITY

## 2019-08-12 RX ORDER — ATORVASTATIN CALCIUM 80 MG/1
TABLET, FILM COATED ORAL
Refills: 0 | Status: ACTIVE | COMMUNITY

## 2019-08-17 NOTE — HISTORY OF PRESENT ILLNESS
[de-identified] : She presented today for a routine post-operative visit.  She is overall feeling well.  She denied fever, chills, nausea, vomiting, diarrhea, or constipation. [de-identified] : Ms. Farley presented to the [facility] emergency room on July 20, 2019, complaining of epigastric pain.  She underwent a right upper quadrant ultrasound, which demonstrated cholelithiasis and choledocholithiasis.  She underwent an ERCP with stone extraction on July 22, 2019.  She underwent a robotic-assisted cholecystectomy and umbilical hernia repair on July 24, 2019.  She was discharged on July 25, 2019.

## 2019-08-17 NOTE — PHYSICAL EXAM
[de-identified] : NAD, comfortable [Calm] : calm [de-identified] : NCAT, no scleral icterus [de-identified] : soft NT ND.  Incisions healing well without erythema or induration.  No evidence of a recurrent umbilical hernia or any incisional hernias on Valsalva maneuver. [de-identified] : No clubbing, cyanosis, or edema. [de-identified] : Warm, dry. [de-identified] : A&Ox3

## 2019-08-17 NOTE — CONSULT LETTER
[FreeTextEntry1] : 2019\par \par \par \par Slava Ledesma M.D\par [address], [address]\[place]  [address]\par Telephone #: [phone]\par \par \par Re: Emily Farley\par : 1935\par \par \par Dear Dr. Ledesma:\par \par I had the opportunity to care for Ms. Farley after she presented to the [facility] emergency room on 2019, complaining of epigastric pain.  She underwent a right upper quadrant ultrasound, which demonstrated cholelithiasis and choledocholithiasis.  She underwent an ERCP with stone extraction on 2019.  She underwent a robotic-assisted cholecystectomy and umbilical hernia repair on 2019.  She was discharged on 2019.She presented today for a routine post-operative visit.  She is overall feeling well.  She denied fever, chills, nausea, vomiting, diarrhea, or constipation.  \par \par On physical examination, her height is 5 feet 3 inches, her weight is 107 pounds, and BMI 19.  Her temperature is 97.3 °F, blood pressure is 120/72, heart rate 63, and O2 saturation is 96% on room air.  In general, she is a well-dressed, well-nourished woman who appears her stated age and is in no acute distress.  She is calm, alert and oriented x 3.  HEENT exam demonstrates no scleral icterus and a normocephalic atraumatic appearance.  Her abdomen is soft, non-tender, and non-distended.  The incisions are healing well without erythema or induration without evidence of a recurrent umbilical hernia or any incisional hernias on Valsalva maneuver.  Her extremities are warm and dry without clubbing, cyanosis or edema.  \par \par The pathology was reviewed with the patient, which demonstrated cholelithiasis with chronic cholecystitis.\par \par In summary, Ms. Farley is an 84-year-old woman who underwent an ERCP on 2019, followed by a robotic-assisted cholecystectomy and umbilical hernia repair on 2019.  She is recovering as expected and will follow up with me as needed.\par \par Thank you for the opportunity to care for this patient. Please do not hesitate to contact me in the event that you have any questions or concerns about the care of this patient.\par \par Sincerely,\par \par \par \par Tahira Restrepo M.D.

## 2019-08-17 NOTE — ASSESSMENT
[FreeTextEntry1] : Ms. Farley is an 84-year-old woman who underwent an ERCP on July 22, 2019, followed by a robotic-assisted cholecystectomy and umbilical hernia repair on July 24, 2019.  She is recovering as expected and will follow up with me as needed.

## 2019-08-17 NOTE — REASON FOR VISIT
[Post Op: _________] : a [unfilled] post op visit [FreeTextEntry1] : She underwent a robotic-assisted cholecystectomy and umbilical hernia repair on July 24, 2019.

## 2019-08-17 NOTE — REVIEW OF SYSTEMS
[Confused] : confusion [Convulsions] : no convulsions [Dizziness] : no dizziness [Fainting] : no fainting [Limb Weakness] : no limb weakness [Difficulty Walking] : no difficulty walking [Negative] : Heme/Lymph

## 2019-10-01 PROCEDURE — C1769: CPT

## 2019-10-01 PROCEDURE — 96375 TX/PRO/DX INJ NEW DRUG ADDON: CPT

## 2019-10-01 PROCEDURE — 80048 BASIC METABOLIC PNL TOTAL CA: CPT

## 2019-10-01 PROCEDURE — C1894: CPT

## 2019-10-01 PROCEDURE — C9399: CPT

## 2019-10-01 PROCEDURE — 71045 X-RAY EXAM CHEST 1 VIEW: CPT

## 2019-10-01 PROCEDURE — 86900 BLOOD TYPING SEROLOGIC ABO: CPT

## 2019-10-01 PROCEDURE — 83605 ASSAY OF LACTIC ACID: CPT

## 2019-10-01 PROCEDURE — 82553 CREATINE MB FRACTION: CPT

## 2019-10-01 PROCEDURE — 80053 COMPREHEN METABOLIC PANEL: CPT

## 2019-10-01 PROCEDURE — 85025 COMPLETE CBC W/AUTO DIFF WBC: CPT

## 2019-10-01 PROCEDURE — 99285 EMERGENCY DEPT VISIT HI MDM: CPT | Mod: 25

## 2019-10-01 PROCEDURE — 85027 COMPLETE CBC AUTOMATED: CPT

## 2019-10-01 PROCEDURE — 82550 ASSAY OF CK (CPK): CPT

## 2019-10-01 PROCEDURE — 82150 ASSAY OF AMYLASE: CPT

## 2019-10-01 PROCEDURE — 83735 ASSAY OF MAGNESIUM: CPT

## 2019-10-01 PROCEDURE — 83690 ASSAY OF LIPASE: CPT

## 2019-10-01 PROCEDURE — 86901 BLOOD TYPING SEROLOGIC RH(D): CPT

## 2019-10-01 PROCEDURE — 85730 THROMBOPLASTIN TIME PARTIAL: CPT

## 2019-10-01 PROCEDURE — 96365 THER/PROPH/DIAG IV INF INIT: CPT

## 2019-10-01 PROCEDURE — 87040 BLOOD CULTURE FOR BACTERIA: CPT

## 2019-10-01 PROCEDURE — 93005 ELECTROCARDIOGRAM TRACING: CPT

## 2019-10-01 PROCEDURE — 36415 COLL VENOUS BLD VENIPUNCTURE: CPT

## 2019-10-01 PROCEDURE — 76705 ECHO EXAM OF ABDOMEN: CPT

## 2019-10-01 PROCEDURE — C1889: CPT

## 2019-10-01 PROCEDURE — 80076 HEPATIC FUNCTION PANEL: CPT

## 2019-10-01 PROCEDURE — 85610 PROTHROMBIN TIME: CPT

## 2019-10-01 PROCEDURE — 86850 RBC ANTIBODY SCREEN: CPT

## 2019-10-01 PROCEDURE — 84484 ASSAY OF TROPONIN QUANT: CPT

## 2019-10-01 PROCEDURE — S2900: CPT

## 2019-10-01 PROCEDURE — 84100 ASSAY OF PHOSPHORUS: CPT

## 2019-10-01 PROCEDURE — 88304 TISSUE EXAM BY PATHOLOGIST: CPT

## 2020-01-09 ENCOUNTER — APPOINTMENT (OUTPATIENT)
Dept: OTOLARYNGOLOGY | Facility: CLINIC | Age: 85
End: 2020-01-09

## 2020-01-27 ENCOUNTER — APPOINTMENT (OUTPATIENT)
Dept: OTOLARYNGOLOGY | Facility: CLINIC | Age: 85
End: 2020-01-27
Payer: MEDICARE

## 2020-01-27 ENCOUNTER — APPOINTMENT (OUTPATIENT)
Dept: PHARMACY | Facility: CLINIC | Age: 85
End: 2020-01-27
Payer: MEDICARE

## 2020-01-27 VITALS
HEART RATE: 63 BPM | TEMPERATURE: 98.2 F | DIASTOLIC BLOOD PRESSURE: 75 MMHG | SYSTOLIC BLOOD PRESSURE: 138 MMHG | RESPIRATION RATE: 17 BRPM | OXYGEN SATURATION: 97 %

## 2020-01-27 DIAGNOSIS — H90.6 MIXED CONDUCTIVE AND SENSORINEURAL HEARING LOSS, BILATERAL: ICD-10-CM

## 2020-01-27 DIAGNOSIS — H91.90 UNSPECIFIED HEARING LOSS, UNSPECIFIED EAR: ICD-10-CM

## 2020-01-27 DIAGNOSIS — H61.23 IMPACTED CERUMEN, BILATERAL: ICD-10-CM

## 2020-01-27 PROCEDURE — V5299A: CUSTOM | Mod: NC

## 2020-01-27 PROCEDURE — 92557 COMPREHENSIVE HEARING TEST: CPT

## 2020-01-27 PROCEDURE — 69210 REMOVE IMPACTED EAR WAX UNI: CPT

## 2020-01-27 PROCEDURE — 99213 OFFICE O/P EST LOW 20 MIN: CPT | Mod: 25

## 2020-01-27 PROCEDURE — 92550 TYMPANOMETRY & REFLEX THRESH: CPT

## 2020-01-28 PROBLEM — H90.6 MIXED HEARING LOSS, BILATERAL: Status: ACTIVE | Noted: 2020-01-28

## 2020-01-28 PROBLEM — H61.23 CERUMINOSIS, BILATERAL: Status: ACTIVE | Noted: 2020-01-28

## 2020-01-28 NOTE — HISTORY OF PRESENT ILLNESS
[de-identified] : 84F previously seen for bilateral hearing loss. She has long h/o meniere's disease managed with die med and surgery. Patient reports that she lost her left hearing aid and came to have her hearing checked as it seems to have decreased. Here with her son and daughter-in-law. She feels her hearing has been decreasing slowly. denies any attacks of sudden hl or vertigo.

## 2020-01-28 NOTE — PROCEDURE
[Cerumen Impaction] : Cerumen Impaction [Same] : same as the Pre Op Dx. [] : Removal of Cerumen [FreeTextEntry6] : b copious cerumen removed atraumatically with suction

## 2020-01-28 NOTE — PHYSICAL EXAM
[de-identified] : b copious cerumen impaction removed arauamatically with suction [Normal] : assessment of respiratory effort is normal

## 2022-02-28 NOTE — ED ADULT TRIAGE NOTE - NSWEIGHTCALCTOOLDRUG_GEN_A_CORE
From: [address]  To: Nuris Cadena  Sent: 2/28/2022 12:19 PM CST  Subject: Arm    From my surgery On my arm I believe they left part of a stitch in there and I don’t see him for a month yet. Should I come in and have it removed?    used

## 2023-12-04 NOTE — ED PROVIDER NOTE - GASTROINTESTINAL [+], MLM
Received request via: Pharmacy    Was the patient seen in the last year in this department? Yes    Does the patient have an active prescription (recently filled or refills available) for medication(s) requested? No    Does the patient have skilled nursing Plus and need 100 day supply (blood pressure, diabetes and cholesterol meds only)? Patient does not have SCP  
NAUSEA/ABDOMINAL PAIN

## 2024-04-29 NOTE — ED PROVIDER NOTE - ABDOMINAL EXAM
Redd Cotter was seen and treated in our emergency department on 4/29/2024.    No restrictions            Diagnosis:     Redd  may return to school on return date.    He may return on this date: 04/30/2024         If you have any questions or concerns, please don't hesitate to call.      MELYSSA Acharya    ______________________________           _______________          _______________  Hospital Representative                              Date                                Time
  Redd Cotter was seen and treated in our emergency department on 4/29/2024.    No restrictions            Diagnosis:     Redd  may return to school on return date.    He may return on this date: 04/30/2024         If you have any questions or concerns, please don't hesitate to call.      MELYSSA Acharya    ______________________________           _______________          _______________  Hospital Representative                              Date                                Time
tender...

## 2024-08-05 VITALS
OXYGEN SATURATION: 97 % | SYSTOLIC BLOOD PRESSURE: 107 MMHG | TEMPERATURE: 98 F | RESPIRATION RATE: 18 BRPM | HEART RATE: 106 BPM | HEIGHT: 63 IN | DIASTOLIC BLOOD PRESSURE: 73 MMHG | WEIGHT: 85.1 LBS

## 2024-08-05 LAB
ALBUMIN SERPL ELPH-MCNC: 3.2 G/DL — LOW (ref 3.3–5)
ALP SERPL-CCNC: 160 U/L — HIGH (ref 40–120)
ALT FLD-CCNC: 12 U/L — SIGNIFICANT CHANGE UP (ref 10–45)
ANION GAP SERPL CALC-SCNC: 8 MMOL/L — SIGNIFICANT CHANGE UP (ref 5–17)
APTT BLD: 30.2 SEC — SIGNIFICANT CHANGE UP (ref 24.5–35.6)
AST SERPL-CCNC: 19 U/L — SIGNIFICANT CHANGE UP (ref 10–40)
BILIRUB SERPL-MCNC: 0.2 MG/DL — SIGNIFICANT CHANGE UP (ref 0.2–1.2)
BUN SERPL-MCNC: 41 MG/DL — HIGH (ref 7–23)
CALCIUM SERPL-MCNC: 8.9 MG/DL — SIGNIFICANT CHANGE UP (ref 8.4–10.5)
CHLORIDE SERPL-SCNC: 126 MMOL/L — HIGH (ref 96–108)
CO2 SERPL-SCNC: 33 MMOL/L — HIGH (ref 22–31)
CREAT SERPL-MCNC: 0.75 MG/DL — SIGNIFICANT CHANGE UP (ref 0.5–1.3)
EGFR: 76 ML/MIN/1.73M2 — SIGNIFICANT CHANGE UP
GLUCOSE SERPL-MCNC: 135 MG/DL — HIGH (ref 70–99)
HCT VFR BLD CALC: 32.6 % — LOW (ref 34.5–45)
HGB BLD-MCNC: 8.4 G/DL — LOW (ref 11.5–15.5)
INR BLD: 1.03 — SIGNIFICANT CHANGE UP (ref 0.85–1.18)
MCHC RBC-ENTMCNC: 19.5 PG — LOW (ref 27–34)
MCHC RBC-ENTMCNC: 25.8 GM/DL — LOW (ref 32–36)
MCV RBC AUTO: 75.6 FL — LOW (ref 80–100)
PLATELET # BLD AUTO: 188 K/UL — SIGNIFICANT CHANGE UP (ref 150–400)
POTASSIUM SERPL-MCNC: 3.7 MMOL/L — SIGNIFICANT CHANGE UP (ref 3.5–5.3)
POTASSIUM SERPL-SCNC: 3.7 MMOL/L — SIGNIFICANT CHANGE UP (ref 3.5–5.3)
PROT SERPL-MCNC: 6.3 G/DL — SIGNIFICANT CHANGE UP (ref 6–8.3)
PROTHROM AB SERPL-ACNC: 11.7 SEC — SIGNIFICANT CHANGE UP (ref 9.5–13)
RBC # BLD: 4.31 M/UL — SIGNIFICANT CHANGE UP (ref 3.8–5.2)
RBC # FLD: 23.9 % — HIGH (ref 10.3–14.5)
SODIUM SERPL-SCNC: 167 MMOL/L — CRITICAL HIGH (ref 135–145)
WBC # BLD: 9.93 K/UL — SIGNIFICANT CHANGE UP (ref 3.8–10.5)
WBC # FLD AUTO: 9.93 K/UL — SIGNIFICANT CHANGE UP (ref 3.8–10.5)

## 2024-08-05 PROCEDURE — 93010 ELECTROCARDIOGRAM REPORT: CPT

## 2024-08-05 PROCEDURE — 99285 EMERGENCY DEPT VISIT HI MDM: CPT

## 2024-08-05 PROCEDURE — 71045 X-RAY EXAM CHEST 1 VIEW: CPT | Mod: 26

## 2024-08-05 RX ORDER — BACTERIOSTATIC SODIUM CHLORIDE 0.9 %
500 VIAL (ML) INJECTION ONCE
Refills: 0 | Status: COMPLETED | OUTPATIENT
Start: 2024-08-05 | End: 2024-08-05

## 2024-08-05 RX ADMIN — Medication 250 MILLILITER(S): at 23:11

## 2024-08-05 NOTE — ED ADULT NURSE NOTE - NSICDXPASTMEDICALHX_GEN_ALL_CORE_FT
PAST MEDICAL HISTORY:  Dementia     HLD (hyperlipidemia)     HTN (hypertension)     Hypothyroid

## 2024-08-05 NOTE — ED PROVIDER NOTE - PROGRESS NOTE DETAILS
[facility]: CXR w/ no obvious infiltrate.   Hgb 8.4, Na 167 (likely 2/2 poor PO intake), Cl 126, bicarb 33, BUN 41, albumin 3.2, alk phos 160, other labs grossly wnl. Pt remains stable. MICU consulted for metabolic derangements.

## 2024-08-05 NOTE — ED PROVIDER NOTE - PHYSICAL EXAMINATION
b/l feet in boots, pt in hallway, unable to examine sacral wound at this time.  Eyes open, not following commands - family state this is her baseline.

## 2024-08-05 NOTE — ED ADULT TRIAGE NOTE - CHIEF COMPLAINT QUOTE
difficulty swallowing for days; per family "she's been aspirating everytime we feed her"; breathing regular and unlabored, not in distress at time of triage

## 2024-08-05 NOTE — ED PROVIDER NOTE - CLINICAL SUMMARY MEDICAL DECISION MAKING FREE TEXT BOX
89F PMH dementia, HTN, HLD, hypothyroidism presents for PEG.   Pt unable to provide any info.   Son/daughter in law at bedside providing information. Also via [facility],   At baseline, pt is non-communicative. Has ~1w of decreased PO intake. Also seems to aspirate/cough every time she tries to eat. Pt was very sleepy but was started empirically on levaquin (today is day 7) and the sleepiness has improved, however she still looks paler than usual. Intermittent constipation. Family was in touch w/ Stefano Carter/Dr. Loza, was referred to ED for likely PEG tomorrow. Baseline HR reportedly ~110.   Has known sores to sacrum and b/l LE.   No reported fevers, coughing when not eating, vomiting, diarrhea.   Mild tachycardia, other vitals wnl. Exam as above.   ddx: Possible metabolic component. Low suspicion new infection (pt on levaquin). Likely component of advanced dementia. Possible aspiration.  d/w Dr. Loza, will get pre-ops then likely admit for likely PEG tomorrow.

## 2024-08-05 NOTE — ED ADULT NURSE NOTE - OBJECTIVE STATEMENT
89 y/o female pmhx dementia A&Ox0 at baseline. Presents to the ED accompanied by HHA c/o "difficulty swallowing" and "aspirating while eating." Non-verbal indicators of pain absent.     difficulty swallowing for days; per family "she's been aspirating everytime we feed her"; breathing regular and unlabored, not in distress at time of triage 89 y/o female pmhx dementia A&Ox0 at baseline. Presents to the ED accompanied by HHA c/o "difficulty swallowing" and "aspirating while eating." Non-verbal indicators of pain absent. HHA denies SOB, F/C, N/V/D. On exam, A&Ox0, RA, bedbound, NAD. Pt with incoherent speech. Responds to verbal stimuli/painful stimuli. Respirations are spontaneous and unlabored. No obvious signs of injury.

## 2024-08-05 NOTE — ED ADULT NURSE NOTE - NSFALLHARMRISKINTERV_ED_ALL_ED
Assistance OOB with selected safe patient handling equipment if applicable/Assistance with ambulation/Communicate risk of Fall with Harm to all staff, patient, and family/Monitor gait and stability/Monitor for mental status changes and reorient to person, place, and time, as needed/Provide visual cue: red socks, yellow wristband, yellow gown, etc/Reinforce activity limits and safety measures with patient and family/Toileting schedule using arm’s reach rule for commode and bathroom/Use of alarms - bed, stretcher, chair and/or video monitoring/Bed in lowest position, wheels locked, appropriate side rails in place/Call bell, personal items and telephone in reach/Instruct patient to call for assistance before getting out of bed/chair/stretcher/Non-slip footwear applied when patient is off stretcher/[place] to call system/Physically safe environment - no spills, clutter or unnecessary equipment/Purposeful Proactive Rounding/Room/bathroom lighting operational, light cord in reach

## 2024-08-05 NOTE — ED PROVIDER NOTE - CARE PLAN
1 Principal Discharge DX:	Adult failure to thrive   Principal Discharge DX:	Adult failure to thrive  Secondary Diagnosis:	Hypernatremia

## 2024-08-05 NOTE — ED PROVIDER NOTE - BREATH SOUNDS
Patient aware, asking if there is anything that needs to be done for this. Please advise. poor inspiratory effort

## 2024-08-05 NOTE — ED PROVIDER NOTE - OBJECTIVE STATEMENT
89F PMH dementia, HTN, HLD, hypothyroidism presents for PEG.   Pt unable to provide any info.   Son/daughter in law at bedside providing information. Also via [facility] Carter,   At baseline, pt is non-communicative. Has ~1w of decreased PO intake. Also seems to aspirate/cough every time she tries to eat. Pt was very sleepy but was started empirically on levaquin (today is day 7) and the sleepiness has improved, however she still looks paler than usual. Intermittent constipation. Family was in touch w/ Stefano Carter/Dr. Loza, was referred to ED for likely PEG tomorrow. Baseline HR reportedly ~110.   Has known sores to sacrum and b/l LE.   No reported fevers, coughing when not eating, vomiting, diarrhea.

## 2024-08-06 ENCOUNTER — INPATIENT (INPATIENT)
Facility: HOSPITAL | Age: 89
LOS: 2 days | Discharge: HOME HEALTH | DRG: 884 | End: 2024-08-09
Attending: SPECIALIST | Admitting: INTERNAL MEDICINE
Payer: MEDICARE

## 2024-08-06 LAB
A1C WITH ESTIMATED AVERAGE GLUCOSE RESULT: 5.7 % — HIGH (ref 4–5.6)
ACANTHOCYTES BLD QL SMEAR: SLIGHT — SIGNIFICANT CHANGE UP
ADD ON TEST-SPECIMEN IN LAB: SIGNIFICANT CHANGE UP
ALBUMIN SERPL ELPH-MCNC: 2.5 G/DL — LOW (ref 3.3–5)
ALP SERPL-CCNC: 128 U/L — HIGH (ref 40–120)
ALT FLD-CCNC: 9 U/L — LOW (ref 10–45)
AMMONIA BLD-MCNC: <10 UMOL/L — LOW (ref 11–55)
ANION GAP SERPL CALC-SCNC: 6 MMOL/L — SIGNIFICANT CHANGE UP (ref 5–17)
ANION GAP SERPL CALC-SCNC: 7 MMOL/L — SIGNIFICANT CHANGE UP (ref 5–17)
ANION GAP SERPL CALC-SCNC: 8 MMOL/L — SIGNIFICANT CHANGE UP (ref 5–17)
ANION GAP SERPL CALC-SCNC: 8 MMOL/L — SIGNIFICANT CHANGE UP (ref 5–17)
ANISOCYTOSIS BLD QL: SIGNIFICANT CHANGE UP
AST SERPL-CCNC: 14 U/L — SIGNIFICANT CHANGE UP (ref 10–40)
BASE EXCESS BLDV CALC-SCNC: 4.5 MMOL/L — HIGH (ref -2–3)
BASOPHILS # BLD AUTO: 0 K/UL — SIGNIFICANT CHANGE UP (ref 0–0.2)
BASOPHILS # BLD AUTO: 0 K/UL — SIGNIFICANT CHANGE UP (ref 0–0.2)
BASOPHILS NFR BLD AUTO: 0 % — SIGNIFICANT CHANGE UP (ref 0–2)
BASOPHILS NFR BLD AUTO: 0 % — SIGNIFICANT CHANGE UP (ref 0–2)
BILIRUB SERPL-MCNC: 0.2 MG/DL — SIGNIFICANT CHANGE UP (ref 0.2–1.2)
BLD GP AB SCN SERPL QL: NEGATIVE — SIGNIFICANT CHANGE UP
BLD GP AB SCN SERPL QL: NEGATIVE — SIGNIFICANT CHANGE UP
BUN SERPL-MCNC: 33 MG/DL — HIGH (ref 7–23)
BUN SERPL-MCNC: 35 MG/DL — HIGH (ref 7–23)
BUN SERPL-MCNC: 36 MG/DL — HIGH (ref 7–23)
BUN SERPL-MCNC: 38 MG/DL — HIGH (ref 7–23)
CA-I SERPL-SCNC: 1.24 MMOL/L — SIGNIFICANT CHANGE UP (ref 1.15–1.33)
CALCIUM SERPL-MCNC: 8 MG/DL — LOW (ref 8.4–10.5)
CALCIUM SERPL-MCNC: 8 MG/DL — LOW (ref 8.4–10.5)
CALCIUM SERPL-MCNC: 8.3 MG/DL — LOW (ref 8.4–10.5)
CALCIUM SERPL-MCNC: 8.3 MG/DL — LOW (ref 8.4–10.5)
CHLORIDE SERPL-SCNC: 126 MMOL/L — HIGH (ref 96–108)
CHLORIDE SERPL-SCNC: 127 MMOL/L — HIGH (ref 96–108)
CHLORIDE SERPL-SCNC: 128 MMOL/L — HIGH (ref 96–108)
CHLORIDE SERPL-SCNC: 130 MMOL/L — HIGH (ref 96–108)
CO2 BLDV-SCNC: 34.4 MMOL/L — HIGH (ref 22–26)
CO2 SERPL-SCNC: 25 MMOL/L — SIGNIFICANT CHANGE UP (ref 22–31)
CO2 SERPL-SCNC: 29 MMOL/L — SIGNIFICANT CHANGE UP (ref 22–31)
CO2 SERPL-SCNC: 30 MMOL/L — SIGNIFICANT CHANGE UP (ref 22–31)
CO2 SERPL-SCNC: 31 MMOL/L — SIGNIFICANT CHANGE UP (ref 22–31)
CREAT SERPL-MCNC: 0.53 MG/DL — SIGNIFICANT CHANGE UP (ref 0.5–1.3)
CREAT SERPL-MCNC: 0.54 MG/DL — SIGNIFICANT CHANGE UP (ref 0.5–1.3)
CREAT SERPL-MCNC: 0.57 MG/DL — SIGNIFICANT CHANGE UP (ref 0.5–1.3)
CREAT SERPL-MCNC: 0.57 MG/DL — SIGNIFICANT CHANGE UP (ref 0.5–1.3)
DACRYOCYTES BLD QL SMEAR: SLIGHT — SIGNIFICANT CHANGE UP
DACRYOCYTES BLD QL SMEAR: SLIGHT — SIGNIFICANT CHANGE UP
EGFR: 87 ML/MIN/1.73M2 — SIGNIFICANT CHANGE UP
EGFR: 87 ML/MIN/1.73M2 — SIGNIFICANT CHANGE UP
EGFR: 88 ML/MIN/1.73M2 — SIGNIFICANT CHANGE UP
EGFR: 88 ML/MIN/1.73M2 — SIGNIFICANT CHANGE UP
ELLIPTOCYTES BLD QL SMEAR: SLIGHT — SIGNIFICANT CHANGE UP
ELLIPTOCYTES BLD QL SMEAR: SLIGHT — SIGNIFICANT CHANGE UP
EOSINOPHIL # BLD AUTO: 0 K/UL — SIGNIFICANT CHANGE UP (ref 0–0.5)
EOSINOPHIL # BLD AUTO: 0 K/UL — SIGNIFICANT CHANGE UP (ref 0–0.5)
EOSINOPHIL NFR BLD AUTO: 0 % — SIGNIFICANT CHANGE UP (ref 0–6)
EOSINOPHIL NFR BLD AUTO: 0 % — SIGNIFICANT CHANGE UP (ref 0–6)
ESTIMATED AVERAGE GLUCOSE: 117 MG/DL — HIGH (ref 68–114)
FERRITIN SERPL-MCNC: 20 NG/ML — SIGNIFICANT CHANGE UP (ref 13–330)
GAS PNL BLDV: 165 MMOL/L — CRITICAL HIGH (ref 136–145)
GAS PNL BLDV: SIGNIFICANT CHANGE UP
GAS PNL BLDV: SIGNIFICANT CHANGE UP
GIANT PLATELETS BLD QL SMEAR: PRESENT — SIGNIFICANT CHANGE UP
GIANT PLATELETS BLD QL SMEAR: PRESENT — SIGNIFICANT CHANGE UP
GLUCOSE BLDC GLUCOMTR-MCNC: 121 MG/DL — HIGH (ref 70–99)
GLUCOSE BLDC GLUCOMTR-MCNC: 99 MG/DL — SIGNIFICANT CHANGE UP (ref 70–99)
GLUCOSE SERPL-MCNC: 109 MG/DL — HIGH (ref 70–99)
GLUCOSE SERPL-MCNC: 116 MG/DL — HIGH (ref 70–99)
GLUCOSE SERPL-MCNC: 127 MG/DL — HIGH (ref 70–99)
GLUCOSE SERPL-MCNC: 95 MG/DL — SIGNIFICANT CHANGE UP (ref 70–99)
HCO3 BLDV-SCNC: 32 MMOL/L — HIGH (ref 22–29)
HCT VFR BLD CALC: 26.2 % — LOW (ref 34.5–45)
HCT VFR BLD CALC: 31 % — LOW (ref 34.5–45)
HGB BLD-MCNC: 6.5 G/DL — CRITICAL LOW (ref 11.5–15.5)
HGB BLD-MCNC: 7.8 G/DL — LOW (ref 11.5–15.5)
HYPOCHROMIA BLD QL: SIGNIFICANT CHANGE UP
IRON SATN MFR SERPL: 17 UG/DL — LOW (ref 30–160)
IRON SATN MFR SERPL: 8 % — LOW (ref 14–50)
LYMPHOCYTES # BLD AUTO: 0.93 K/UL — LOW (ref 1–3.3)
LYMPHOCYTES # BLD AUTO: 1.42 K/UL — SIGNIFICANT CHANGE UP (ref 1–3.3)
LYMPHOCYTES # BLD AUTO: 12.7 % — LOW (ref 13–44)
LYMPHOCYTES # BLD AUTO: 14.3 % — SIGNIFICANT CHANGE UP (ref 13–44)
MACROCYTES BLD QL: SLIGHT — SIGNIFICANT CHANGE UP
MAGNESIUM SERPL-MCNC: 2.4 MG/DL — SIGNIFICANT CHANGE UP (ref 1.6–2.6)
MANUAL SMEAR VERIFICATION: SIGNIFICANT CHANGE UP
MCHC RBC-ENTMCNC: 18.5 PG — LOW (ref 27–34)
MCHC RBC-ENTMCNC: 19.4 PG — LOW (ref 27–34)
MCHC RBC-ENTMCNC: 24.8 GM/DL — LOW (ref 32–36)
MCHC RBC-ENTMCNC: 25.2 GM/DL — LOW (ref 32–36)
MCV RBC AUTO: 74.4 FL — LOW (ref 80–100)
MCV RBC AUTO: 77.1 FL — LOW (ref 80–100)
MICROCYTES BLD QL: SIGNIFICANT CHANGE UP
MONOCYTES # BLD AUTO: 0.13 K/UL — SIGNIFICANT CHANGE UP (ref 0–0.9)
MONOCYTES # BLD AUTO: 0.36 K/UL — SIGNIFICANT CHANGE UP (ref 0–0.9)
MONOCYTES NFR BLD AUTO: 1.8 % — LOW (ref 2–14)
MONOCYTES NFR BLD AUTO: 3.6 % — SIGNIFICANT CHANGE UP (ref 2–14)
NEUTROPHILS # BLD AUTO: 6.27 K/UL — SIGNIFICANT CHANGE UP (ref 1.8–7.4)
NEUTROPHILS # BLD AUTO: 8.15 K/UL — HIGH (ref 1.8–7.4)
NEUTROPHILS NFR BLD AUTO: 81.2 % — HIGH (ref 43–77)
NEUTROPHILS NFR BLD AUTO: 85.5 % — HIGH (ref 43–77)
NEUTS BAND # BLD: 0.9 % — SIGNIFICANT CHANGE UP (ref 0–8)
NRBC # BLD: 0 /100 WBCS — SIGNIFICANT CHANGE UP (ref 0–0)
OSMOLALITY SERPL: 348 MOSM/KG — HIGH (ref 280–301)
OVALOCYTES BLD QL SMEAR: SIGNIFICANT CHANGE UP
OVALOCYTES BLD QL SMEAR: SIGNIFICANT CHANGE UP
OVALOCYTES BLD QL SMEAR: SLIGHT — SIGNIFICANT CHANGE UP
PCO2 BLDV: 63 MMHG — HIGH (ref 39–42)
PH BLDV: 7.32 — SIGNIFICANT CHANGE UP (ref 7.32–7.43)
PHOSPHATE SERPL-MCNC: 2.4 MG/DL — LOW (ref 2.5–4.5)
PLAT MORPH BLD: ABNORMAL
PLATELET # BLD AUTO: 159 K/UL — SIGNIFICANT CHANGE UP (ref 150–400)
PLATELET # BLD AUTO: 170 K/UL — SIGNIFICANT CHANGE UP (ref 150–400)
PO2 BLDV: <33 MMHG — SIGNIFICANT CHANGE UP (ref 25–45)
POIKILOCYTOSIS BLD QL AUTO: SIGNIFICANT CHANGE UP
POLYCHROMASIA BLD QL SMEAR: SLIGHT — SIGNIFICANT CHANGE UP
POTASSIUM BLDV-SCNC: 3.2 MMOL/L — LOW (ref 3.5–5.1)
POTASSIUM SERPL-MCNC: 3.2 MMOL/L — LOW (ref 3.5–5.3)
POTASSIUM SERPL-MCNC: 3.7 MMOL/L — SIGNIFICANT CHANGE UP (ref 3.5–5.3)
POTASSIUM SERPL-MCNC: 4 MMOL/L — SIGNIFICANT CHANGE UP (ref 3.5–5.3)
POTASSIUM SERPL-MCNC: 4.2 MMOL/L — SIGNIFICANT CHANGE UP (ref 3.5–5.3)
POTASSIUM SERPL-SCNC: 3.2 MMOL/L — LOW (ref 3.5–5.3)
POTASSIUM SERPL-SCNC: 3.7 MMOL/L — SIGNIFICANT CHANGE UP (ref 3.5–5.3)
POTASSIUM SERPL-SCNC: 4 MMOL/L — SIGNIFICANT CHANGE UP (ref 3.5–5.3)
POTASSIUM SERPL-SCNC: 4.2 MMOL/L — SIGNIFICANT CHANGE UP (ref 3.5–5.3)
PROT SERPL-MCNC: 5 G/DL — LOW (ref 6–8.3)
RBC # BLD: 3.52 M/UL — LOW (ref 3.8–5.2)
RBC # BLD: 4.02 M/UL — SIGNIFICANT CHANGE UP (ref 3.8–5.2)
RBC # FLD: 23.5 % — HIGH (ref 10.3–14.5)
RBC # FLD: 24.2 % — HIGH (ref 10.3–14.5)
RBC BLD AUTO: ABNORMAL
RH IG SCN BLD-IMP: POSITIVE — SIGNIFICANT CHANGE UP
SAO2 % BLDV: 35.1 % — LOW (ref 67–88)
SCHISTOCYTES BLD QL AUTO: SLIGHT — SIGNIFICANT CHANGE UP
SCHISTOCYTES BLD QL AUTO: SLIGHT — SIGNIFICANT CHANGE UP
SMUDGE CELLS # BLD: PRESENT — SIGNIFICANT CHANGE UP
SMUDGE CELLS # BLD: PRESENT — SIGNIFICANT CHANGE UP
SODIUM SERPL-SCNC: 159 MMOL/L — HIGH (ref 135–145)
SODIUM SERPL-SCNC: 164 MMOL/L — CRITICAL HIGH (ref 135–145)
SODIUM SERPL-SCNC: 165 MMOL/L — CRITICAL HIGH (ref 135–145)
SODIUM SERPL-SCNC: 167 MMOL/L — CRITICAL HIGH (ref 135–145)
SPHEROCYTES BLD QL SMEAR: SLIGHT — SIGNIFICANT CHANGE UP
SPHEROCYTES BLD QL SMEAR: SLIGHT — SIGNIFICANT CHANGE UP
T4 FREE SERPL-MCNC: 0.94 NG/DL — SIGNIFICANT CHANGE UP (ref 0.93–1.7)
TARGETS BLD QL SMEAR: SLIGHT — SIGNIFICANT CHANGE UP
TIBC SERPL-MCNC: 224 UG/DL — SIGNIFICANT CHANGE UP (ref 220–430)
TSH SERPL-MCNC: 4.74 UIU/ML — HIGH (ref 0.27–4.2)
UIBC SERPL-MCNC: 207 UG/DL — SIGNIFICANT CHANGE UP (ref 110–370)
WBC # BLD: 7.33 K/UL — SIGNIFICANT CHANGE UP (ref 3.8–10.5)
WBC # BLD: 7.96 K/UL — SIGNIFICANT CHANGE UP (ref 3.8–10.5)
WBC # FLD AUTO: 7.33 K/UL — SIGNIFICANT CHANGE UP (ref 3.8–10.5)
WBC # FLD AUTO: 7.96 K/UL — SIGNIFICANT CHANGE UP (ref 3.8–10.5)

## 2024-08-06 PROCEDURE — 99222 1ST HOSP IP/OBS MODERATE 55: CPT | Mod: GC

## 2024-08-06 RX ORDER — HEPARIN SODIUM 1000 [USP'U]/ML
5000 INJECTION, SOLUTION INTRAVENOUS; SUBCUTANEOUS EVERY 12 HOURS
Refills: 0 | Status: DISCONTINUED | OUTPATIENT
Start: 2024-08-06 | End: 2024-08-06

## 2024-08-06 RX ORDER — LEVOTHYROXINE SODIUM 175 MCG
1 TABLET ORAL
Refills: 0 | DISCHARGE

## 2024-08-06 RX ORDER — LEVOTHYROXINE SODIUM 175 MCG
17 TABLET ORAL AT BEDTIME
Refills: 0 | Status: DISCONTINUED | OUTPATIENT
Start: 2024-08-06 | End: 2024-08-09

## 2024-08-06 RX ORDER — DEXTROSE MONOHYDRATE, SODIUM CHLORIDE, SODIUM LACTATE, CALCIUM CHLORIDE, MAGNESIUM CHLORIDE 1.5; 538; 448; 18.4; 5.08 G/100ML; MG/100ML; MG/100ML; MG/100ML; MG/100ML
1000 SOLUTION INTRAPERITONEAL
Refills: 0 | Status: DISCONTINUED | OUTPATIENT
Start: 2024-08-06 | End: 2024-08-06

## 2024-08-06 RX ORDER — HEPARIN SODIUM 1000 [USP'U]/ML
5000 INJECTION, SOLUTION INTRAVENOUS; SUBCUTANEOUS EVERY 8 HOURS
Refills: 0 | Status: DISCONTINUED | OUTPATIENT
Start: 2024-08-06 | End: 2024-08-06

## 2024-08-06 RX ORDER — HEPARIN SODIUM 1000 [USP'U]/ML
5000 INJECTION, SOLUTION INTRAVENOUS; SUBCUTANEOUS EVERY 12 HOURS
Refills: 0 | Status: DISCONTINUED | OUTPATIENT
Start: 2024-08-06 | End: 2024-08-09

## 2024-08-06 RX ORDER — ENOXAPARIN SODIUM 120 MG/.8ML
30 INJECTION SUBCUTANEOUS EVERY 24 HOURS
Refills: 0 | Status: DISCONTINUED | OUTPATIENT
Start: 2024-08-06 | End: 2024-08-06

## 2024-08-06 RX ORDER — MEMANTINE HYDROCHLORIDE 14 MG/1
2 CAPSULE, EXTENDED RELEASE ORAL
Refills: 0 | DISCHARGE

## 2024-08-06 RX ORDER — POTASSIUM CHLORIDE 1500 MG/1
10 TABLET, EXTENDED RELEASE ORAL
Refills: 0 | Status: COMPLETED | OUTPATIENT
Start: 2024-08-06 | End: 2024-08-06

## 2024-08-06 RX ORDER — TRAZODONE HCL 100 MG
1 TABLET ORAL
Refills: 0 | DISCHARGE

## 2024-08-06 RX ADMIN — POTASSIUM CHLORIDE 100 MILLIEQUIVALENT(S): 1500 TABLET, EXTENDED RELEASE ORAL at 07:31

## 2024-08-06 RX ADMIN — Medication 500 MILLILITER(S): at 00:08

## 2024-08-06 RX ADMIN — POTASSIUM CHLORIDE 100 MILLIEQUIVALENT(S): 1500 TABLET, EXTENDED RELEASE ORAL at 10:55

## 2024-08-06 RX ADMIN — DEXTROSE MONOHYDRATE, SODIUM CHLORIDE, SODIUM LACTATE, CALCIUM CHLORIDE, MAGNESIUM CHLORIDE 60 MILLILITER(S): 1.5; 538; 448; 18.4; 5.08 SOLUTION INTRAPERITONEAL at 15:05

## 2024-08-06 RX ADMIN — DEXTROSE MONOHYDRATE, SODIUM CHLORIDE, SODIUM LACTATE, CALCIUM CHLORIDE, MAGNESIUM CHLORIDE 60 MILLILITER(S): 1.5; 538; 448; 18.4; 5.08 SOLUTION INTRAPERITONEAL at 06:52

## 2024-08-06 RX ADMIN — HEPARIN SODIUM 5000 UNIT(S): 1000 INJECTION, SOLUTION INTRAVENOUS; SUBCUTANEOUS at 17:17

## 2024-08-06 RX ADMIN — DEXTROSE MONOHYDRATE, SODIUM CHLORIDE, SODIUM LACTATE, CALCIUM CHLORIDE, MAGNESIUM CHLORIDE 100 MILLILITER(S): 1.5; 538; 448; 18.4; 5.08 SOLUTION INTRAPERITONEAL at 17:39

## 2024-08-06 RX ADMIN — POTASSIUM CHLORIDE 100 MILLIEQUIVALENT(S): 1500 TABLET, EXTENDED RELEASE ORAL at 08:54

## 2024-08-06 RX ADMIN — Medication 17 MICROGRAM(S): at 22:44

## 2024-08-06 NOTE — DIETITIAN NUTRITION RISK NOTIFICATION - ADDITIONAL COMMENTS/DIETITIAN RECOMMENDATIONS
Pt meets ASPEN criteria for severe, chronic protein-calorie malnutrition in setting of severe muscle/fat wasting.

## 2024-08-06 NOTE — PROVIDER CONTACT NOTE (CRITICAL VALUE NOTIFICATION) - BACKGROUND
Pt presents to facility with failure to thrive, aspirating and coughing when eating at home.
Admitted for hypernatremia
Pt presents to facility with failure to thrive, aspirating and coughing when eating at home.
Pt presents to facility with failure to thrive, aspirating and coughing while eating at home.

## 2024-08-06 NOTE — H&P ADULT - NSHPPHYSICALEXAM_GEN_ALL_CORE
General: Appears comfortable, laying in bed  Oral: Dry mucous membranes  Cardiovascular:  RRR  Respiratory: CTA B/L-no active participation by patient  Gastrointestinal: soft, NT/ND; +BSx4  Extremities: no edema to LE, cool UE   Vascular: 2+ radial pulses  Neurological: A/O x 0, Non verbal, not responding to her name, unable to open eyes,   Skin: Wound to sacrum and b/l LE

## 2024-08-06 NOTE — H&P ADULT - ASSESSMENT
89F PMH dementia, HTN, HLD, hypothyroidism presented to the ED for Failure To Thrive and PEG placement (pt of Dr. Loza) admitted to tele for Hypernatremia of 167 and PEG placement given Failure To Thrive.    NEURO  At baseline  ?hyperosmolar coma Sosm 350  - Sosm  - Correct sodium slowly -> decrease by 8 in 24 hours      CARDIO  Hydration      PULM  SpO2 >94%  -VBG with lytes         GI  #Failure To Thrive  #Dysphagia   Has ~1w of decreased PO intake. Also seems to aspirate/cough every time she tries to eat.  Family was in touch w/ Stefano Carter/Dr. Loza, was referred to ED PEG tomorrow (8/6/24)  Plan:  -Pending PEG placement   -F/u GI recs  -NPO       RENAL  #Hypernatremia   - Calc free water deficit  - q4h BMP  - monitor UO  - q6h BS  - strict I&O's     ENDO  - TSH    ID  - d/c levaquin  - watch for signs of infection  - UA    HEME  - iron studies,     Heparin subcutaneous 89F PMH dementia, HTN, HLD, hypothyroidism presented to the ED for Failure To Thrive and PEG placement (pt of Dr. Loza) admitted to tele for Hypernatremia of 167 and PEG placement given Failure To Thrive.    NEURO  At baseline A/O x 0  ?hyperosmolar coma Sosm 350  - Sosm  - Correct sodium slowly -> decrease by 8 in 24 hours      CARDIO  Hydration      PULM  SpO2 >94%  -VBG with lytes         GI  #Failure To Thrive  #Dysphagia   Has ~1w of decreased PO intake. Also seems to aspirate/cough every time she tries to eat.  Family was in touch w/ Stefano Carter/Dr. Loza, was referred to ED PEG tomorrow (8/6/24)  Plan:  -Pending PEG placement   -F/u GI recs  -NPO       RENAL  #Hypernatremia   - Calc free water deficit  - q4h BMP  - monitor UO  - q6h BS  - strict I&O's     ENDO  - TSH    ID  - d/c levaquin  - watch for signs of infection  - UA    HEME  - iron studies,     Heparin subcutaneous 89F PMH dementia, HTN, HLD, hypothyroidism presented to the ED for Failure To Thrive and PEG placement (pt of Dr. Loza) admitted to tele for Hypernatremia of 167 and PEG placement given Failure To Thrive.    NEURO  At baseline A/O x 0  ?hyperosmolar coma Sosm 350  - Sosm  - Correct sodium slowly -> decrease by 8 in 24 hours      CARDIO  Hydration      PULM  SpO2 >94%  -VBG with lytes         GI  #Failure To Thrive  #Dysphagia   Has ~1w of decreased PO intake. Also seems to aspirate/cough every time she tries to eat.  Family was in touch w/ Stefano Carter/Dr. Loza, was referred to ED for PEG tomorrow (8/6/24)  Plan:  -Pending PEG placement   -F/u GI recs  -NPO       RENAL  #Hypernatremia likely 2/2 decrease PO intake   On presentation pt noted with Na 167   Plan:  - Calc free water deficit  -BMP Q4h  -Monitor UO  -Q6h BS  -Strict I&O's   -Correct sodium slowly -> decrease by 8 in 24 hours    ENDO  - TSH    ID    d/c levaquin  - watch for signs of infection  - UA    HEME  - iron studies,     Heparin subcutaneous 89F PMH dementia, HTN, HLD, hypothyroidism presented to the ED for Failure To Thrive and PEG placement (pt of Dr. Loza) admitted to tele for Hypernatremia of 167 and PEG placement given Failure To Thrive.    NEURO  At baseline A/O x 0    #R/O possible hyperosmolar coma   Plan:  - F/u Sosm  -Treat underlying hypernatremia as below    #Dementia  Home meds: Donepezil 10mg , Memantine 20mg, Trazadone 50mg, Quetiapine 25mg   -Confirm home meds in am      CARDIO  #HTN  -Get med rec in am, nothing on superscripts     #HLD  Home med: Atorvastatin 10mg   -Confirm home med in am       PULM  -SpO2 >94%  -F/u VBG with lytes       GI  #Failure To Thrive  #Dysphagia   Has ~1w of decreased PO intake. Also seems to aspirate/cough every time she tries to eat.  Family was in touch w/ Stef[facility] Carter/Dr. Loza, was referred to ED for PEG tomorrow (8/6/24)  Plan:  -Pending PEG placement   -Reach out to GI and f/u PEG placement timing  -NPO       RENAL  #Hypernatremia likely 2/2 decrease PO intake   On presentation pt noted with Na 167   Plan:  -Started on......... ***Insert fluids here***  -Correct sodium slowly -> decrease by 8 in 24 hours  -BMP Q4h  -Monitor UO  -Q6h BS  -Strict I&O's       ENDO  #Hypothyroidism   Home med: levothyroxine 25mcg   Plan;  -F/u TSH/Free T4  -Confirm home med in am       ID  #R/O Possible infection   Pt was recently started on Levaquin outpatient, unclear for what type of infection   No leukocytosis on admission, A-febrile--D/c Levaquin   Plan;  -Continue to monitor off Antibiotics, watch for any signs of infection   -F/u UA      HEME  #Microcytic Anemia  Hgb 8.4, MCV 75  Plan:  -F/u Iron studies  -Maintain active T/S  -Monitor for any acute signs of bleeding       PROPHYLAXIS  F: ***Insert fluids here***  E: Replete PRN  N: NPO-pending PEG placement   DVT: Heparin Sub   DISPO: 7 Lachman   89F PMH dementia, HTN, HLD, hypothyroidism presented to the ED for Failure To Thrive and PEG placement (pt of Dr. Loza) admitted to tele for Hypernatremia of 167 and PEG placement given Failure To Thrive.    NEURO  At baseline A/O x 0    #R/O possible hyperosmolar coma   Plan:  - F/u Sosm  -Treat underlying hypernatremia as below    #Dementia  Home meds: Donepezil 10mg , Memantine 20mg, Trazadone 50mg, Quetiapine 25mg   -Confirm home meds in am      CARDIO  #HTN  -Get med rec in am, nothing on superscripts     #HLD  Home med: Atorvastatin 10mg   -Confirm home med in am       PULM  -SpO2 >94%  -F/u VBG with lytes       GI  #Failure To Thrive  #Dysphagia   Has ~1w of decreased PO intake. Also seems to aspirate/cough every time she tries to eat.  Family was in touch w/ Stefano Carter/Dr. Loza, was referred to ED for PEG tomorrow (8/6/24)  Plan:  -Pending PEG placement   -Reach out to GI and f/u PEG placement timing  -NPO       RENAL  #Hypernatremia likely 2/2 decrease PO intake   On presentation pt noted with Na 167   Plan:  -Started on......... ***Insert fluids here***  -Correct sodium slowly -> decrease by 8 in 24 hours  -BMP Q4h  -Monitor UO  -Q6h BS  -Strict I&O's       ENDO  #Hypothyroidism   Home med: levothyroxine 25mcg   Plan;  -F/u TSH/Free T4  -Confirm home med in am       ID  #R/O Possible infection   Pt was recently started on Levaquin outpatient, unclear for what type of infection   No leukocytosis on admission, A-febrile--D/c Levaquin   Plan;  -Continue to monitor off Antibiotics, watch for any signs of infection   -F/u UA      HEME  #Microcytic Anemia  Hgb 8.4, MCV 75  Plan:  -F/u Iron studies  -Maintain active T/S  -Monitor for any acute signs of bleeding       PROPHYLAXIS  F: ***Insert fluids here***  E: Replete PRN  N: NPO-pending PEG placement   DVT: Hold off for now  DISPO: 7 Lachman   89F PMH dementia, HTN, HLD, hypothyroidism presented to the ED for Failure To Thrive and PEG placement (pt of Dr. Loza) admitted to tele for Hypernatremia of 167 and PEG placement given Failure To Thrive.    NEURO  At baseline A/O x 0    #R/O possible hyperosmolar coma   Plan:  - F/u Sosm  -Treat underlying hypernatremia as below    #Dementia  Home meds: Donepezil 10mg , Memantine 20mg, Trazadone 50mg, Quetiapine 25mg   -Confirm home meds in am      CARDIO  #HTN  -Get med rec in am, nothing on superscripts     #HLD  Home med: Atorvastatin 10mg   -Confirm home med in am       PULM  -SpO2 >94%  -F/u VBG with lytes       GI  #Failure To Thrive  #Dysphagia   Has ~1w of decreased PO intake. Also seems to aspirate/cough every time she tries to eat.  Family was in touch w/ Stefano Carter/Dr. Loza, was referred to ED for PEG tomorrow (8/6/24)  Plan:  -Pending PEG placement   -Reach out to GI and f/u PEG placement timing  -NPO       RENAL  #Hypernatremia likely 2/2 decrease PO intake   On presentation pt noted with Na 167   Plan:  -Started on 1/2 NS @ 60ml/hr  -Correct sodium slowly -> decrease by 8 in 24 hours  -BMP Q4h  -Monitor UO  -Q6h BS  -Strict I&O's       ENDO  #Hypothyroidism   Home med: levothyroxine 25mcg   Plan;  -F/u TSH/Free T4  -Confirm home med in am       ID  #R/O Possible infection   Pt was recently started on Levaquin outpatient, unclear for what type of infection   No leukocytosis on admission, A-febrile--D/c Levaquin   Plan;  -Continue to monitor off Antibiotics, watch for any signs of infection   -F/u UA      HEME  #Microcytic Anemia  Hgb 8.4, MCV 75  Plan:  -F/u Iron studies  -Maintain active T/S  -Monitor for any acute signs of bleeding       PROPHYLAXIS  F: 1/2 NS @ 60ml/hr  E: Replete PRN  N: NPO-pending PEG placement   DVT: Hold off for now  DISPO:  L[facility]   89F PMH dementia, HTN, HLD, hypothyroidism presented to the ED for Failure To Thrive and PEG placement (pt of Dr. Loza) admitted to tele for Hypernatremia of 167 and PEG placement given Failure To Thrive.    NEURO  At baseline A/O x 0    #Acute Metabolic Encephalopathy   #R/O possible hyperosmolar coma   Plan:  -F/u Sosm  -F/u TSH, Syphillis and ammonia level  -Treat underlying hypernatremia as below    #Dementia  Home meds: Donepezil 10mg , Memantine 20mg, Trazadone 50mg, Quetiapine 25mg   -Confirm home meds in am      CARDIO  #HTN  -Get med rec in am, nothing on superscripts     #HLD  Home med: Atorvastatin 10mg   -Confirm home med in am       PULM  -SpO2 >94%  -F/u VBG with lytes       GI  #Failure To Thrive  #Dysphagia   Has ~1w of decreased PO intake. Also seems to aspirate/cough every time she tries to eat.  Family was in touch w/ Stefano Carter/Dr. Loza, was referred to ED for PEG tomorrow (8/6/24)  Plan:  -Pending PEG placement   -Reach out to GI and f/u PEG placement timing  -NPO       RENAL  #Hypernatremia likely 2/2 decrease PO intake   On presentation pt noted with Na 167   Plan:  -Started on 1/2 NS @ 60ml/hr  -Correct sodium slowly -> decrease by 8 in 24 hours  -BMP Q4h  -Monitor UO  -Q6h BS  -Strict I&O's       ENDO  #Hypothyroidism   Home med: levothyroxine 25mcg   Plan;  -F/u TSH/Free T4  -Confirm home med in am       ID  #R/O Possible infection   Pt was recently started on Levaquin outpatient, unclear for what type of infection   No leukocytosis on admission, A-febrile--D/c Levaquin   Plan;  -Continue to monitor off Antibiotics, watch for any signs of infection   -F/u UA      HEME  #Microcytic Anemia  Hgb 8.4, MCV 75  Plan:  -F/u Iron studies  -Maintain active T/S  -Monitor for any acute signs of bleeding       PROPHYLAXIS  F: 1/2 NS @ 60ml/hr  E: Replete PRN  N: NPO-pending PEG placement   DVT: Hold off for now  DISPO: 7 Lachman

## 2024-08-06 NOTE — ADVANCED PRACTICE NURSE CONSULT - REASON FOR CONSULT
multiple pressure injuries, present on admission    Per chart review, 89F PMH dementia, HTN, HLD, hypothyroidism presented to the ED for Failure To Thrive and PEG placement (pt of Dr. Loza) admitted to tele for Hypernatremia of 167 and PEG placement given Failure To Thrive

## 2024-08-06 NOTE — PATIENT PROFILE ADULT - NSTRANSFERBELONGINGSDISPO_GEN_A_NUR
Pharmacy Note: Renal dose adjustment for Tramadol (Ultram)  Mary Silver has been prescribed Tramadol (Ultram)  50 mg orally every 6 hours as needed for pain. Estimated Creatinine Clearance: 15.1 mL/min (based on SCr of 5.92 mg/dL (H)).     His calculate with HHA/with patient

## 2024-08-06 NOTE — DIETITIAN INITIAL EVALUATION ADULT - PERTINENT LABORATORY DATA
08-06    164<HH>  |  127<H>  |  38<H>  ----------------------------<  95  3.7   |  29  |  0.57    Ca    8.3<L>      06 Aug 2024 10:00  Phos  2.4     08-06  Mg     2.4     08-06    TPro  5.0<L>  /  Alb  2.5<L>  /  TBili  0.2  /  DBili  x   /  AST  14  /  ALT  9<L>  /  AlkPhos  128<H>  08-06  A1C with Estimated Average Glucose Result: 5.7 % (08-06-24 @ 06:08)

## 2024-08-06 NOTE — H&P ADULT - HISTORY OF PRESENT ILLNESS
88yo female with PMHx of dementia (A&Ox0 at baseline), HTN, HLD, hypothyroidism presents for possible PEG placement tomorrow iso dysphagia. History provided by ED provider from son/daughter in law at bedside; no longer at bedside during this writers evaluation of the patient.. Pt has ~1w of decreased PO intake. Seems to aspirate/cough every time she tries to eat. Pt was very sleepy over the past 1-2 weeks and was started on Levaquin empirically with improvement; unknown infectious source. Lethargy has improved and now patient is alert and at baseline, although family admits she is still very pale. Admits to intermittent constipation. Denies fevers, cough, vomiting, diarrhea. Has known sacral sores. Family was in touch w/ Stefano Carter/Dr. Loza, was referred to ED for likely PEG tomorrow. Baseline HR reportedly ~110. No reported fevers, coughing when not eating, vomiting, diarrhea.    ED Course:  VS: T98F,  -> 94, /73, RR 18, SpO2 97% RA  Labs: Hb 8.4, MCV 75.6, Plt 188, Na 167, Cl 126, bicarb 33, BUN 41, Cr 0.75, glucose 135, albumin 3.2,    EKG: NSR, no ischemic changes, QTc 469  CXR: Hyperinflated lungs and prominent vasculature; pending official read  Interventions: NS 500cc bolus x2   88yo female with PMHx of dementia (A&Ox0 at baseline), HTN, HLD, hypothyroidism presents for possible PEG placement tomorrow iso dysphagia. History provided by ED provider from son/daughter in law at bedside; no longer at bedside during this writers evaluation of the patient.. Pt has ~1w of decreased PO intake. Seems to aspirate/cough every time she tries to eat. Pt was very sleepy over the past 1-2 weeks and was started on Levaquin empirically with improvement; unknown infectious source. Lethargy has improved and now patient is alert and at baseline, although family admits she is still very pale. Admits to intermittent constipation. Denies fevers, cough, vomiting, diarrhea. Has known sacral sores. Family was in touch w/ Stefano Carter/Dr. Loza, was referred to ED for likely PEG tomorrow. Baseline HR reportedly ~110. No reported fevers, coughing when not eating, vomiting, diarrhea.    ED Course:  VS: T98F,  -> 94, /73, RR 18, SpO2 97% RA  Labs: Hb 8.4, MCV 75.6, Plt 188, Na 167, Cl 126, bicarb 33, BUN 41, Cr 0.75, glucose 135, albumin 3.2,    EKG: NSR, no ischemic changes, QTc 469  CXR: Hyperinflated lungs and prominent vasculature; pending official read  Interventions: NS 500cc bolus x2  Consult: ICU

## 2024-08-06 NOTE — H&P ADULT - NSHPLABSRESULTS_GEN_ALL_CORE
LABS:                         8.4    9.93  )-----------( 188      ( 05 Aug 2024 21:26 )             32.6     08-05    167<HH>  |  126<H>  |  41<H>  ----------------------------<  135<H>  3.7   |  33<H>  |  0.75    Ca    8.9      05 Aug 2024 21:26    TPro  6.3  /  Alb  3.2<L>  /  TBili  0.2  /  DBili  x   /  AST  19  /  ALT  12  /  AlkPhos  160<H>  08-05    PT/INR - ( 05 Aug 2024 21:26 )   PT: 11.7 sec;   INR: 1.03          PTT - ( 05 Aug 2024 21:26 )  PTT:30.2 sec  Urinalysis Basic - ( 05 Aug 2024 21:26 )    Color: x / Appearance: x / SG: x / pH: x  Gluc: 135 mg/dL / Ketone: x  / Bili: x / Urobili: x   Blood: x / Protein: x / Nitrite: x   Leuk Esterase: x / RBC: x / WBC x   Sq Epi: x / Non Sq Epi: x / Bacteria: x    RADIOLOGY, EKG & ADDITIONAL TESTS: Reviewed.

## 2024-08-06 NOTE — PROGRESS NOTE ADULT - ASSESSMENT
89F PMH dementia, HTN, HLD, hypothyroidism presented to the ED for Failure To Thrive and PEG placement (pt of Dr. Loza) admitted to tele for Hypernatremia of 167 and PEG placement given Failure To Thrive.    NEURO  At baseline A/O x 0    #Acute Metabolic Encephalopathy   #R/O possible hyperosmolar coma     Plan:  -F/u Sosm  -F/u TSH, Syphillis and ammonia level  -Treat underlying hypernatremia as below    #Dementia  Home meds: Donepezil 10mg , Memantine 20mg, Trazadone 50mg, Quetiapine 25mg   -Confirm home meds in am      CARDIO  #HTN  -Get med rec in am, nothing on superscripts     #HLD  Home med: Atorvastatin 10mg   -Confirm home med in am       PULM  -SpO2 >94%  -F/u VBG with lytes       GI  #Failure To Thrive  #Dysphagia   Has ~1w of decreased PO intake. Also seems to aspirate/cough every time she tries to eat.  Family was in touch w/ Stefano Carter/Dr. Loza, was referred to ED for PEG tomorrow (8/6/24)  Plan:  -Pending PEG placement   -Reach out to GI and f/u PEG placement timing  -NPO       RENAL  #Hypernatremia likely 2/2 decrease PO intake   On presentation pt noted with Na 167   Plan:  -Started on 1/2 NS @ 60ml/hr  -Correct sodium slowly -> decrease by 8 in 24 hours  -BMP Q4h  -Monitor UO  -Q6h BS  -Strict I&O's       ENDO  #Hypothyroidism   Home med: levothyroxine 25mcg   Plan;  -F/u TSH/Free T4  -Confirm home med in am       ID  #R/O Possible infection   Pt was recently started on Levaquin outpatient, unclear for what type of infection   No leukocytosis on admission, A-febrile--D/c Levaquin   Plan;  -Continue to monitor off Antibiotics, watch for any signs of infection   -F/u UA      HEME  #Microcytic Anemia  Hgb 8.4, MCV 75  Plan:  -F/u Iron studies  -Maintain active T/S  -Monitor for any acute signs of bleeding       PROPHYLAXIS  F: 1/2 NS @ 60ml/hr  E: Replete PRN  N: NPO-pending PEG placement   DVT: Hold off for now  DISPO: 7 Lachman   89F PMH dementia, HTN, HLD, hypothyroidism presented to the ED for Failure To Thrive and PEG placement (pt of Dr. Loza) admitted to tele for Hypernatremia of 167 and PEG placement given Failure To Thrive.    NEURO  At baseline A/O x 0    #Acute Metabolic Encephalopathy   #R/O possible hyperosmolar coma   - hyperosmolar coma possible given elevated serum osmo. patient also severely hypernatremic. AMS likely multifactorial iso of poor PO intake, advanced dementia, hyperosmolarity with significant hypernatremia. Free T4 wnl. ammonia <10.     Plan:  -Treat underlying hypernatremia as below  - f/u syphilis    #Dementia  Home meds: Donepezil 10mg , Memantine 20mg, Trazadone 50mg, Quetiapine 25mg   -Confirm home meds      CARDIO  #HTN  - currently normotensive  -Get med rec, nothing on superscripts     #HLD  Home med: Atorvastatin 10mg   -Confirm home med      PULM  -SpO2 >94%  -F/u VBG with lytes       GI  #Failure To Thrive  #Dysphagia   Has ~1w of decreased PO intake. Also seems to aspirate/cough every time she tries to eat.  Family was in touch w/ Stefano Carter/Dr. Loza, was referred to ED for PEG    Plan:  -Pending PEG placement once hypernatremia resolved  -Reach out to GI and f/u PEG placement timing  -NPO       RENAL  #Hypernatremia likely 2/2 decrease PO intake   On presentation pt noted with Na 167, reduced today to 164 with D5W    Plan:  - free water deficit 0.9L  - c/w D5W 60 mL/hr  -Correct sodium slowly -> decrease by 8 in 24 hours  -BMP Q4h  -Monitor UO  -Q6h BS  -Strict I&O's       ENDO  #Hypothyroidism   Home med: levothyroxine 25mcg  - TSH elevated at 4.7, free T4 wnl    Plan;  - converted to IV levothyroxine 17mcg    ID  #R/O Possible infection   Pt was recently started on Levaquin outpatient, unclear for what type of infection   No leukocytosis since admission, A-febrile    Plan;  -Continue to monitor off Antibiotics, watch for any signs of infection   -F/u UA      HEME  #Microcytic Anemia  Hgb 8.4, MCV 75  Plan:  -F/u Iron studies  -Maintain active T/S  -Monitor for any acute signs of bleeding       PROPHYLAXIS  F: 1/2 NS @ 60ml/hr  E: Replete PRN  N: NPO-pending PEG placement   DVT: Hold off for now  DISPO: 7 Lachman   89F PMH dementia, HTN, HLD, hypothyroidism presented to the ED for Failure To Thrive and PEG placement (pt of Dr. Loza) admitted to tele for Hypernatremia of 167 and PEG placement given Failure To Thrive.    NEURO  At baseline A/O x 0    #Acute Metabolic Encephalopathy   #R/O possible hyperosmolar coma   - hyperosmolar coma possible given elevated serum osmo. patient also severely hypernatremic. AMS likely multifactorial iso of poor PO intake, advanced dementia, hyperosmolarity with significant hypernatremia. Free T4 wnl. ammonia <10.     Plan:  -Treat underlying hypernatremia as below  - f/u syphilis    #Dementia  Home meds: Donepezil 10mg , Memantine 20mg, Trazadone 50mg, Quetiapine 25mg   -Confirm home meds      CARDIO  #HTN  - currently normotensive  -Get med rec, nothing on superscripts     #HLD  Home med: Atorvastatin 10mg   -Confirm home med      PULM  -SpO2 >94%  -F/u VBG with lytes       GI  #Failure To Thrive  #Dysphagia   Has ~1w of decreased PO intake. Also seems to aspirate/cough every time she tries to eat.  Family was in touch w/ Stefano Carter/Dr. Loza, was referred to ED for PEG    Plan:  -Pending PEG placement once hypernatremia resolved  -Reach out to GI and f/u PEG placement timing  -NPO       RENAL  #Hypernatremia likely 2/2 decrease PO intake   On presentation pt noted with Na 167, reduced today to 164 with D5W    Plan:  - free water deficit 0.9L  - c/w D5W 60 mL/hr  -Correct sodium slowly -> decrease by 8 in 24 hours  -BMP Q4h  -Monitor UO  -Q6h BS  -Strict I&O's       ENDO  #Hypothyroidism   Home med: levothyroxine 25mcg  - TSH elevated at 4.7, free T4 wnl    Plan;  - converted to IV levothyroxine 17mcg    ID  #R/O Possible infection   Pt was recently started on Levaquin outpatient, unclear for what type of infection   No leukocytosis since admission, A-febrile    Plan;  -Continue to monitor off Antibiotics, watch for any signs of infection   -F/u UA      HEME  #Anemia of chronic disease  Hgb 7.8, MCV 75  ferritin 20, total iron 17, % sat 8, total binding capacity wnl.       Plan:  -Maintain active T/S  - treat underlying medical conditions      PROPHYLAXIS  F: 1/2 NS @ 60ml/hr  E: Replete PRN  N: NPO-pending PEG placement   DVT: Hold off for now  DISPO: 7 Lachman   89F PMH dementia, HTN, HLD, hypothyroidism presented to the ED for Failure To Thrive and PEG placement (pt of Dr. Loza) admitted to tele for Hypernatremia of 167 and PEG placement given Failure To Thrive.    NEURO  At baseline A/O x 0    #Acute Metabolic Encephalopathy   #R/O possible hyperosmolar coma   - hyperosmolar coma possible given elevated serum osmo. patient also severely hypernatremic. AMS likely multifactorial iso of poor PO intake, advanced dementia, hyperosmolarity with significant hypernatremia. Free T4 wnl. ammonia <10.     Plan:  -Treat underlying hypernatremia as below  - f/u syphilis    #Dementia  Home meds: Donepezil 10mg , Memantine 20mg, Trazadone 50mg, Quetiapine 25mg   -Confirm home meds      CARDIO  #HTN  - currently normotensive  -Get med rec, nothing on superscripts     #HLD  Home med: Atorvastatin 10mg   - Confirm home med      PULM  -SpO2 >94%  -F/u VBG with lytes       GI  #Failure To Thrive  #Dysphagia   Has ~1w of decreased PO intake. Also seems to aspirate/cough every time she tries to eat.  Family was in touch w/ Stefano Carter/Dr. Loza, was referred to ED for PEG    Plan:  -Pending PEG placement once hypernatremia resolved  -Reach out to GI and f/u PEG placement timing  -NPO       RENAL  #Hypernatremia likely 2/2 decrease PO intake   On presentation pt noted with Na 167, reduced today to 164 with D5W    Plan:  - free water deficit 0.9L  - c/w D5W 60 mL/hr  -Correct sodium slowly -> decrease by 8 in 24 hours  -BMP Q4h  -Monitor UO  -Q6h BS  -Strict I&O's       ENDO  #Hypothyroidism   Home med: levothyroxine 25mcg  - TSH elevated at 4.7, free T4 wnl    Plan;  - converted to IV levothyroxine 17mcg    ID  #R/O Possible infection   Pt was recently started on Levaquin outpatient, unclear for what type of infection   No leukocytosis since admission, A-febrile    Plan;  -Continue to monitor off Antibiotics, watch for any signs of infection   -F/u UA      HEME  #Anemia of chronic disease  Hgb 7.8, MCV 75  ferritin 20, total iron 17, % sat 8, total binding capacity wnl.       Plan:  -Maintain active T/S  - treat underlying medical conditions      PROPHYLAXIS  F: 1/2 NS @ 60ml/hr  E: Replete PRN  N: NPO-pending PEG placement   DVT: Hold off for now  DISPO: 7 Lachman

## 2024-08-06 NOTE — ADVANCED PRACTICE NURSE CONSULT - RECOMMEDATIONS
Bilateral heels, bilateral medial hallux: Cavilon, open to air daily  Bilateral posterior calf: telfa, vivi every other day  Sacrococcygeal, left hip, back: Triad, foam every other day    Continue heel offloading with soft boots, frequent repositioning. Pending PEG for nutrition  Discussed with primary RN and medical team    Total time spent: 60 minutes Bilateral heels, bilateral medial hallux: Cavilon, open to air daily  Bilateral posterior calf: telfa, vivi every other day  Sacrococcygeal, left hip, back: Triad, foam every other day    Continue heel offloading with soft boots, frequent repositioning. Pending PEG for nutrition  Discussed with primary RN and medical team Dr Johnston    Total time spent: 60 minutes Bilateral heels, bilateral medial hallux: Cavilon, open to air daily  Bilateral posterior calf: telfa, vivi every other day  Sacrococcygeal, left hip, back: Triad, foam every other day    Continue heel offloading with soft boots, frequent repositioning. Pending PEG for nutrition  Discussed with primary RN and medical team Dr Do    Total time spent: 60 minutes

## 2024-08-06 NOTE — DIETITIAN INITIAL EVALUATION ADULT - NSFNSPHYEXAMSKINFT_GEN_A_CORE
Pressure Injury 1: Left:, greater trochanter (hip), Unstageable  Pressure Injury 2: flank area, Suspected deep tissue injury  Pressure Injury 3: sacrum, Unstageable  Pressure Injury 4: Bilateral:, heel, Suspected deep tissue injury  Pressure Injury 5: Bilateral:, calf, Suspected deep tissue injury  Pressure Injury 6: none, none  Pressure Injury 7: none, none  Pressure Injury 8: none, none  Pressure Injury 9: none, none  Pressure Injury 10: none, none  Pressure Injury 11: none, none

## 2024-08-06 NOTE — PROGRESS NOTE ADULT - SUBJECTIVE AND OBJECTIVE BOX
Patient is a 89y old  Female who presents with a chief complaint of ADULT FAILURE TO THRIVEHYPERNATREMIA     (06 Aug 2024 15:07)    OVERNIGHT EVENTS: naeon    SUBJECTIVE: minimally responsive to verbal commands, AAOx0.     ROS: otherwise negative      T(C): 36.2 (08-06-24 @ 14:37), Max: 36.7 (08-05-24 @ 20:40)  HR: 78 (08-06-24 @ 11:55) (76 - 106)  BP: 110/59 (08-06-24 @ 11:55) (94/54 - 110/59)  RR: 18 (08-06-24 @ 11:55) (17 - 18)  SpO2: 96% (08-06-24 @ 11:55) (93% - 97%)  Wt(kg): --Vital Signs Last 24 Hrs  T(C): 36.2 (06 Aug 2024 14:37), Max: 36.7 (05 Aug 2024 20:40)  T(F): 97.2 (06 Aug 2024 14:37), Max: 98 (05 Aug 2024 20:40)  HR: 78 (06 Aug 2024 11:55) (76 - 106)  BP: 110/59 (06 Aug 2024 11:55) (94/54 - 110/59)  BP(mean): 80 (06 Aug 2024 11:55) (69 - 80)  RR: 18 (06 Aug 2024 11:55) (17 - 18)  SpO2: 96% (06 Aug 2024 11:55) (93% - 97%)    Parameters below as of 06 Aug 2024 11:55  Patient On (Oxygen Delivery Method): room air        PHYSICAL EXAM:  Constitutional: resting comfortably in bed; NAD  Head: NC/AT  Eyes: PERRL, EOMI, anicteric sclera  ENT: no nasal discharge; MMM  Neck: supple; no JVD or thyromegaly  Respiratory: CTA B/L; no W/R/R, no retractions  Cardiac: +S1/S2; RRR; no M/R/G  Gastrointestinal: soft, NT/ND; no rebound or guarding; +BSx4  Extremities: WWP, no clubbing or cyanosis; no peripheral edema. Capillary refill <2 sec  Vascular: 2+ radial, DP/PT pulses B/L  Dermatologic: skin warm, dry and intact; no rashes, wounds, or scars  Neurologic: AAOx3  Psychiatric: affect and characteristics of appearance, verbalizations, behaviors are appropriate    LABS:                        7.8    7.96  )-----------( 170      ( 06 Aug 2024 10:00 )             31.0     08-06    164<HH>  |  127<H>  |  38<H>  ----------------------------<  95  3.7   |  29  |  0.57    Ca    8.3<L>      06 Aug 2024 10:00  Phos  2.4     08-06  Mg     2.4     08-06    TPro  5.0<L>  /  Alb  2.5<L>  /  TBili  0.2  /  DBili  x   /  AST  14  /  ALT  9<L>  /  AlkPhos  128<H>  08-06    Iron 17, TIBC 224, %Sat 8, Ferritin 20/ 08-06-24 @ 06:08    PT/INR - ( 05 Aug 2024 21:26 )   PT: 11.7 sec;   INR: 1.03          PTT - ( 05 Aug 2024 21:26 )  PTT:30.2 sec  Urinalysis Basic - ( 06 Aug 2024 10:00 )    Color: x / Appearance: x / SG: x / pH: x  Gluc: 95 mg/dL / Ketone: x  / Bili: x / Urobili: x   Blood: x / Protein: x / Nitrite: x   Leuk Esterase: x / RBC: x / WBC x   Sq Epi: x / Non Sq Epi: x / Bacteria: x      CAPILLARY BLOOD GLUCOSE            Urinalysis Basic - ( 06 Aug 2024 10:00 )    Color: x / Appearance: x / SG: x / pH: x  Gluc: 95 mg/dL / Ketone: x  / Bili: x / Urobili: x   Blood: x / Protein: x / Nitrite: x   Leuk Esterase: x / RBC: x / WBC x   Sq Epi: x / Non Sq Epi: x / Bacteria: x        MEDICATIONS  (STANDING):  dextrose 5%. 1000 milliLiter(s) (60 mL/Hr) IV Continuous <Continuous>  heparin   Injectable 5000 Unit(s) SubCutaneous every 12 hours  levothyroxine Injectable 17 MICROGram(s) IV Push at bedtime    MEDICATIONS  (PRN):      RADIOLOGY & ADDITIONAL TESTS: Reviewed   Patient is a 89y old  Female who presents with a chief complaint of ADULT FAILURE TO THRIVEHYPERNATREMIA     (06 Aug 2024 15:07)    OVERNIGHT EVENTS: naeon    SUBJECTIVE: minimally responsive to verbal commands, AAOx0.     ROS: otherwise negative      T(C): 36.2 (08-06-24 @ 14:37), Max: 36.7 (08-05-24 @ 20:40)  HR: 78 (08-06-24 @ 11:55) (76 - 106)  BP: 110/59 (08-06-24 @ 11:55) (94/54 - 110/59)  RR: 18 (08-06-24 @ 11:55) (17 - 18)  SpO2: 96% (08-06-24 @ 11:55) (93% - 97%)  Wt(kg): --Vital Signs Last 24 Hrs  T(C): 36.2 (06 Aug 2024 14:37), Max: 36.7 (05 Aug 2024 20:40)  T(F): 97.2 (06 Aug 2024 14:37), Max: 98 (05 Aug 2024 20:40)  HR: 78 (06 Aug 2024 11:55) (76 - 106)  BP: 110/59 (06 Aug 2024 11:55) (94/54 - 110/59)  BP(mean): 80 (06 Aug 2024 11:55) (69 - 80)  RR: 18 (06 Aug 2024 11:55) (17 - 18)  SpO2: 96% (06 Aug 2024 11:55) (93% - 97%)    Parameters below as of 06 Aug 2024 11:55  Patient On (Oxygen Delivery Method): room air        PHYSICAL EXAM:  Constitutional: resting comfortably in bed; NAD  Head: NC/AT  Eyes: PERRL, EOMI, anicteric sclera  ENT: no nasal discharge; MMM  Neck: supple; no JVD or thyromegaly  Respiratory: CTA B/L; no W/R/R, no retractions  Cardiac: +S1/S2; RRR; no M/R/G  Gastrointestinal: soft, NT/ND; no rebound or guarding; +BSx4  Extremities: WWP, no clubbing or cyanosis; no peripheral edema. Capillary refill <2 sec  Vascular: 2+ radial, DP/PT pulses B/L  Dermatologic: skin warm, dry and intact; no rashes, wounds, or scars  Neurologic: AAOx0  Psychiatric: affect and characteristics of appearance, verbalizations, behaviors are appropriate    LABS:                        7.8    7.96  )-----------( 170      ( 06 Aug 2024 10:00 )             31.0     08-06    164<HH>  |  127<H>  |  38<H>  ----------------------------<  95  3.7   |  29  |  0.57    Ca    8.3<L>      06 Aug 2024 10:00  Phos  2.4     08-06  Mg     2.4     08-06    TPro  5.0<L>  /  Alb  2.5<L>  /  TBili  0.2  /  DBili  x   /  AST  14  /  ALT  9<L>  /  AlkPhos  128<H>  08-06    Iron 17, TIBC 224, %Sat 8, Ferritin 20/ 08-06-24 @ 06:08    PT/INR - ( 05 Aug 2024 21:26 )   PT: 11.7 sec;   INR: 1.03          PTT - ( 05 Aug 2024 21:26 )  PTT:30.2 sec  Urinalysis Basic - ( 06 Aug 2024 10:00 )    Color: x / Appearance: x / SG: x / pH: x  Gluc: 95 mg/dL / Ketone: x  / Bili: x / Urobili: x   Blood: x / Protein: x / Nitrite: x   Leuk Esterase: x / RBC: x / WBC x   Sq Epi: x / Non Sq Epi: x / Bacteria: x      CAPILLARY BLOOD GLUCOSE            Urinalysis Basic - ( 06 Aug 2024 10:00 )    Color: x / Appearance: x / SG: x / pH: x  Gluc: 95 mg/dL / Ketone: x  / Bili: x / Urobili: x   Blood: x / Protein: x / Nitrite: x   Leuk Esterase: x / RBC: x / WBC x   Sq Epi: x / Non Sq Epi: x / Bacteria: x        MEDICATIONS  (STANDING):  dextrose 5%. 1000 milliLiter(s) (60 mL/Hr) IV Continuous <Continuous>  heparin   Injectable 5000 Unit(s) SubCutaneous every 12 hours  levothyroxine Injectable 17 MICROGram(s) IV Push at bedtime    MEDICATIONS  (PRN):      RADIOLOGY & ADDITIONAL TESTS: Reviewed   Patient is a 89y old female who presents with a chief complaint of ADULT FAILURE TO THRIVE, HYPERNATREMIA     (06 Aug 2024 15:07)    OVERNIGHT EVENTS: naeon    SUBJECTIVE: minimally responsive to verbal commands, AAOx0.     ROS: otherwise negative      T(C): 36.2 (08-06-24 @ 14:37), Max: 36.7 (08-05-24 @ 20:40)  HR: 78 (08-06-24 @ 11:55) (76 - 106)  BP: 110/59 (08-06-24 @ 11:55) (94/54 - 110/59)  RR: 18 (08-06-24 @ 11:55) (17 - 18)  SpO2: 96% (08-06-24 @ 11:55) (93% - 97%)  Wt(kg): --Vital Signs Last 24 Hrs  T(C): 36.2 (06 Aug 2024 14:37), Max: 36.7 (05 Aug 2024 20:40)  T(F): 97.2 (06 Aug 2024 14:37), Max: 98 (05 Aug 2024 20:40)  HR: 78 (06 Aug 2024 11:55) (76 - 106)  BP: 110/59 (06 Aug 2024 11:55) (94/54 - 110/59)  BP(mean): 80 (06 Aug 2024 11:55) (69 - 80)  RR: 18 (06 Aug 2024 11:55) (17 - 18)  SpO2: 96% (06 Aug 2024 11:55) (93% - 97%)    Parameters below as of 06 Aug 2024 11:55  Patient On (Oxygen Delivery Method): room air        PHYSICAL EXAM:  Constitutional: resting comfortably in bed; NAD  Head: NC/AT  Eyes: PERRL, EOMI, anicteric sclera  ENT: no nasal discharge; MMM  Neck: supple; no JVD or thyromegaly  Respiratory: CTA B/L; no W/R/R, no retractions  Cardiac: +S1/S2; RRR; no M/R/G  Gastrointestinal: soft, NT/ND; no rebound or guarding; +BSx4  Extremities: WWP, no clubbing or cyanosis; no peripheral edema. Capillary refill <2 sec  Vascular: 2+ radial, DP/PT pulses B/L  Dermatologic: skin warm, dry and intact; no rashes, wounds, or scars  Neurologic: AAOx0  Psychiatric: affect and characteristics of appearance, verbalizations, behaviors are appropriate    LABS:                        7.8    7.96  )-----------( 170      ( 06 Aug 2024 10:00 )             31.0     08-06    164<HH>  |  127<H>  |  38<H>  ----------------------------<  95  3.7   |  29  |  0.57    Ca    8.3<L>      06 Aug 2024 10:00  Phos  2.4     08-06  Mg     2.4     08-06    TPro  5.0<L>  /  Alb  2.5<L>  /  TBili  0.2  /  DBili  x   /  AST  14  /  ALT  9<L>  /  AlkPhos  128<H>  08-06    Iron 17, TIBC 224, %Sat 8, Ferritin 20/ 08-06-24 @ 06:08    PT/INR - ( 05 Aug 2024 21:26 )   PT: 11.7 sec;   INR: 1.03          PTT - ( 05 Aug 2024 21:26 )  PTT:30.2 sec  Urinalysis Basic - ( 06 Aug 2024 10:00 )    Color: x / Appearance: x / SG: x / pH: x  Gluc: 95 mg/dL / Ketone: x  / Bili: x / Urobili: x   Blood: x / Protein: x / Nitrite: x   Leuk Esterase: x / RBC: x / WBC x   Sq Epi: x / Non Sq Epi: x / Bacteria: x      CAPILLARY BLOOD GLUCOSE            Urinalysis Basic - ( 06 Aug 2024 10:00 )    Color: x / Appearance: x / SG: x / pH: x  Gluc: 95 mg/dL / Ketone: x  / Bili: x / Urobili: x   Blood: x / Protein: x / Nitrite: x   Leuk Esterase: x / RBC: x / WBC x   Sq Epi: x / Non Sq Epi: x / Bacteria: x        MEDICATIONS  (STANDING):  dextrose 5%. 1000 milliLiter(s) (60 mL/Hr) IV Continuous <Continuous>  heparin   Injectable 5000 Unit(s) SubCutaneous every 12 hours  levothyroxine Injectable 17 MICROGram(s) IV Push at bedtime    MEDICATIONS  (PRN):      RADIOLOGY & ADDITIONAL TESTS: Reviewed   Patient is a 89y old female who presents with a chief complaint of ADULT FAILURE TO THRIVE, HYPERNATREMIA     (06 Aug 2024 15:07)    OVERNIGHT EVENTS: naeon    SUBJECTIVE: minimally responsive to verbal commands, AAOx0.     ROS: otherwise negative      T(C): 36.2 (08-06-24 @ 14:37), Max: 36.7 (08-05-24 @ 20:40)  HR: 78 (08-06-24 @ 11:55) (76 - 106)  BP: 110/59 (08-06-24 @ 11:55) (94/54 - 110/59)  RR: 18 (08-06-24 @ 11:55) (17 - 18)  SpO2: 96% (08-06-24 @ 11:55) (93% - 97%)  Wt(kg): --Vital Signs Last 24 Hrs  T(C): 36.2 (06 Aug 2024 14:37), Max: 36.7 (05 Aug 2024 20:40)  T(F): 97.2 (06 Aug 2024 14:37), Max: 98 (05 Aug 2024 20:40)  HR: 78 (06 Aug 2024 11:55) (76 - 106)  BP: 110/59 (06 Aug 2024 11:55) (94/54 - 110/59)  BP(mean): 80 (06 Aug 2024 11:55) (69 - 80)  RR: 18 (06 Aug 2024 11:55) (17 - 18)  SpO2: 96% (06 Aug 2024 11:55) (93% - 97%)    Parameters below as of 06 Aug 2024 11:55  Patient On (Oxygen Delivery Method): room air        PHYSICAL EXAM:  Constitutional: resting comfortably in bed; NAD  Head: NC/AT  Eyes: PERRL, EOMI, anicteric sclera  ENT: no nasal discharge; MMM  Neck: supple; no JVD or thyromegaly  Respiratory: CTA B/L; no W/R/R, no retractions  Cardiac: +S1/S2; RRR; no M/R/G  Gastrointestinal: soft, NT/ND; no rebound or guarding; +BSx4  Extremities: WWP, no clubbing or cyanosis; no peripheral edema. Capillary refill <2 sec  Vascular: 2+ radial, DP/PT pulses B/L  Dermatologic: skin warm, dry and intact; no rashes, wounds, or scars  Neurologic: AAOx0    LABS:                        7.8    7.96  )-----------( 170      ( 06 Aug 2024 10:00 )             31.0     08-06    164<HH>  |  127<H>  |  38<H>  ----------------------------<  95  3.7   |  29  |  0.57    Ca    8.3<L>      06 Aug 2024 10:00  Phos  2.4     08-06  Mg     2.4     08-06    TPro  5.0<L>  /  Alb  2.5<L>  /  TBili  0.2  /  DBili  x   /  AST  14  /  ALT  9<L>  /  AlkPhos  128<H>  08-06    Iron 17, TIBC 224, %Sat 8, Ferritin 20/ 08-06-24 @ 06:08    PT/INR - ( 05 Aug 2024 21:26 )   PT: 11.7 sec;   INR: 1.03          PTT - ( 05 Aug 2024 21:26 )  PTT:30.2 sec  Urinalysis Basic - ( 06 Aug 2024 10:00 )    Color: x / Appearance: x / SG: x / pH: x  Gluc: 95 mg/dL / Ketone: x  / Bili: x / Urobili: x   Blood: x / Protein: x / Nitrite: x   Leuk Esterase: x / RBC: x / WBC x   Sq Epi: x / Non Sq Epi: x / Bacteria: x      CAPILLARY BLOOD GLUCOSE            Urinalysis Basic - ( 06 Aug 2024 10:00 )    Color: x / Appearance: x / SG: x / pH: x  Gluc: 95 mg/dL / Ketone: x  / Bili: x / Urobili: x   Blood: x / Protein: x / Nitrite: x   Leuk Esterase: x / RBC: x / WBC x   Sq Epi: x / Non Sq Epi: x / Bacteria: x        MEDICATIONS  (STANDING):  dextrose 5%. 1000 milliLiter(s) (60 mL/Hr) IV Continuous <Continuous>  heparin   Injectable 5000 Unit(s) SubCutaneous every 12 hours  levothyroxine Injectable 17 MICROGram(s) IV Push at bedtime    MEDICATIONS  (PRN):      RADIOLOGY & ADDITIONAL TESTS: Reviewed

## 2024-08-06 NOTE — ADVANCED PRACTICE NURSE CONSULT - ASSESSMENT
Pt awake, 2 person assist to turn. BMI 15  Primafit for urine, incontinent of stool.    Right heel deep tissue pressure injury: 2x2cm serosanguinous filled blister, foot warm, dry, no edema.   Left heel deep tissue pressure injury: 4x3cm flood filled blister, foot warm, dry, no edema  Right medial hallux, deep tissue pressure injury: flattened blood filled blister, 1x1cm  Left medial hallux, deep tissue pressure injury: 0.5x0.5cm maroon intact tissue  Bilateral posterior calf: reabsorbing blood filled blisters, approx 4x3cm, of unknown etiology. Pt in soft boots that would not cause pressure.   Sacrococcygeal unstageable pressure injury: 12x6cm area of 50% evolving maroon tissue and 50% adherent yellow slough. Scant drainage  Back, deep tissue pressure injury: multiple brenda of intact maroon tissue along the lumbar spine. Each area small, approx 1x1cm  Left hip, unstageable pressure injury: 2x2cm, 100% yellow slough, no drainage

## 2024-08-06 NOTE — PATIENT PROFILE ADULT - FUNCTIONAL ASSESSMENT - BASIC MOBILITY 6.
1-calculated by average/Not able to assess (calculate score using [facility] averaging method)

## 2024-08-06 NOTE — PROGRESS NOTE ADULT - SUBJECTIVE AND OBJECTIVE BOX
88yo female with PMHx of dementia (A&Ox0 at baseline),baseline tachy =110/min sinus on hme ekgs HTN, HLD, hypothyroidism, recurrent aspiration recently on Levaquin presents for evaluation of  possible PEG placement due to  dysphagia.   Pt was very sleepy over the past 1-2 weeks and was started on Levaquin empirically with improvement; unknown infectious source. Lethargy has improved and now patient is alert and at baseline, although family admits she is still very pale. Admitted to monitor due to hypernatremia and anemia    ED Course:  VS: T98F,  -> 94, /73, RR 18, SpO2 97% RA  Labs: Hb 8.4, MCV 75.6, Plt 188, Na 167, Cl 126, bicarb 33, BUN 41, Cr 0.75, glucose 135, albumin 3.2,    EKG: NSR, no ischemic changes, QTc 469  CXR: Hyperinflated lungs and prominent vasculature; pending official read  Interventions: NS 500cc bolus x2  Consult: ICU   (06 Aug 2024 03:43)      REVIEW OF SYSTEMS:  unable    PAST MEDICAL & SURGICAL HISTORY:  HTN (hypertension)      Hypothyroid      HLD (hyperlipidemia)      Dementia      No significant past surgical history          FAMILY HISTORY:      SOCIAL HISTORY:  Smoking Status: [ ] Current, [ ] Former, [ ] Never  Pack Years:    MEDICATIONS:  MEDICATIONS  (STANDING):  potassium chloride  10 mEq/100 mL IVPB 10 milliEquivalent(s) IV Intermittent every 1 hour  sodium chloride 0.45%. 1000 milliLiter(s) (60 mL/Hr) IV Continuous <Continuous>    MEDICATIONS  (PRN):      Allergies    No Known Allergies    Intolerances        Vital Signs Last 24 Hrs  T(C): 35.3 (06 Aug 2024 10:30), Max: 36.7 (05 Aug 2024 20:40)  T(F): 95.5 (06 Aug 2024 10:30), Max: 98 (05 Aug 2024 20:40)  HR: 78 (06 Aug 2024 08:10) (76 - 106)  BP: 103/56 (06 Aug 2024 08:10) (94/54 - 107/73)  BP(mean): 75 (06 Aug 2024 08:10) (69 - 76)  RR: 17 (06 Aug 2024 08:10) (17 - 18)  SpO2: 96% (06 Aug 2024 08:10) (93% - 97%)    Parameters below as of 06 Aug 2024 08:10  Patient On (Oxygen Delivery Method): room air            PHYSICAL EXAM:    General: calm and comfortable non verbal, malnourished and pale in no acute distress  HEENT: MMM, conjunctiva and sclera clear  Gastrointestinal: Soft, non-tender non-distended; Normal bowel sounds; No rebound or guarding  Extremities: Normal range of motion, No clubbing, cyanosis or edema  Neurological: non verbal  Skin: Warm and dry. No obvious rash      LABS:                        6.5    7.33  )-----------( 159      ( 06 Aug 2024 06:08 )             26.2     08-06    167<HH>  |  130<H>  |  36<H>  ----------------------------<  116<H>  3.2<L>   |  31  |  0.57    Ca    8.0<L>      06 Aug 2024 06:08  Phos  2.4     08-06  Mg     2.4     08-06    TPro  5.0<L>  /  Alb  2.5<L>  /  TBili  0.2  /  DBili  x   /  AST  14  /  ALT  9<L>  /  AlkPhos  128<H>  08-06          RADIOLOGY & ADDITIONAL STUDIES:

## 2024-08-06 NOTE — CONSULT NOTE ADULT - SUBJECTIVE AND OBJECTIVE BOX
Critical Care Consult Note:      HPI:  The patient is a 88yo female with PMHx of dementia (A&Ox0 at baseline), HTN, HLD, hypothyroidism presents for possible PEG placement tomorrow iso dysphagia. History provided by ED provider from son/daughter in law at bedside; no longer at bedside during this writers evaluation of the patient. Pt has ~1w of decreased PO intake. Seems to aspirate/cough every time she tries to eat. Pt was very sleepy over the past 1-2 weeks and was started on Levaquin empirically with improvement; unknown infectious source. Lethargy has improved and now patient is alert and at baseline, although family admits she is still very pale. Admits to intermittent constipation. Denies fevers, cough, vomiting, diarrhea. Has known sacral sores. Family was in touch w/ Stefano Carter/Dr. Loza, was referred to ED for likely PEG tomorrow. Baseline HR reportedly ~110. No reported fevers, coughing when not eating, vomiting, diarrhea.    In the ED, patient was aferbile, HR , /73, RR 18, SpO2 97% RA. Found to have Hgb of 8.4, Na 167, Cl 126, bicarb 33, and BUN 41. Given NS 500cc bolus x2.       ROS: Unable to obtain.      No Known Allergies        Other Medications:  heparin   Injectable 5000 Unit(s) SubCutaneous every 12 hours      FAMILY HISTORY:    PAST MEDICAL & SURGICAL HISTORY:  HTN (hypertension)  Hypothyroidism  HLD (hyperlipidemia)  Dementia      SOCIAL HISTORY: Unable to obtain. HHA at bedside. Unable to reach family.       Daily Height in cm: 160.02 (05 Aug 2024 20:40)    Vital Signs Last 24 Hrs  T(C): 36.3 (05 Aug 2024 23:57), Max: 36.7 (05 Aug 2024 20:40)  T(F): 97.4 (05 Aug 2024 23:57), Max: 98 (05 Aug 2024 20:40)  HR: 84 (06 Aug 2024 02:56) (84 - 106)  BP: 94/54 (06 Aug 2024 02:56) (94/54 - 107/73)  BP(mean): 69 (06 Aug 2024 02:56) (69 - 69)  RR: 18 (06 Aug 2024 02:56) (18 - 18)  SpO2: 94% (06 Aug 2024 02:56) (94% - 97%)    Parameters below as of 06 Aug 2024 02:56  Patient On (Oxygen Delivery Method): room air      PHYSICAL EXAM:  Constitutional: elderly cachectic/malnourished female, alert, resting comfortably; NAD  HEENT: NC/AT, PERRL, anicteric sclera, dry MM  Neck: supple  Respiratory: CTA b/l, no W/R/R, no retractions  Cardiovascular: +S1/S2; RRR; +Grade 3/6 systolic murmur best heard at RUSB (unable to appreciate radiation)  Gastrointestinal: soft, (+) BS, NT/ND; no rebound or guarding  Extremities: 2+ pitting edema up to mid-thighs; No joint swelling, edema or erythema  Vascular: Cool hands/feet, 1+ radial/DP/PT pulses, poor capillary refill  Dermatologic: warm, dry, intact; no rashes  Neurologic: alert & awake, not able to answer questions, nonsensical speech, moving all extremities spontaneously  Psychiatric: calm, occasionally agitated from touch      Lab Results:                        8.4    9.93  )-----------( 188      ( 05 Aug 2024 21:26 )             32.6     08-05    167<HH>  |  126<H>  |  41<H>  ----------------------------<  135<H>  3.7   |  33<H>  |  0.75    Ca    8.9      05 Aug 2024 21:26    TPro  6.3  /  Alb  3.2<L>  /  TBili  0.2  /  DBili  x   /  AST  19  /  ALT  12  /  AlkPhos  160<H>  08-05    LIVER FUNCTIONS - ( 05 Aug 2024 21:26 )  Alb: 3.2 g/dL / Pro: 6.3 g/dL / ALK PHOS: 160 U/L / ALT: 12 U/L / AST: 19 U/L / GGT: x           PT/INR - ( 05 Aug 2024 21:26 )   PT: 11.7 sec;   INR: 1.03          PTT - ( 05 Aug 2024 21:26 )  PTT:30.2 sec  Urinalysis Basic - ( 05 Aug 2024 21:26 )    Color: x / Appearance: x / SG: x / pH: x  Gluc: 135 mg/dL / Ketone: x  / Bili: x / Urobili: x   Blood: x / Protein: x / Nitrite: x   Leuk Esterase: x / RBC: x / WBC x   Sq Epi: x / Non Sq Epi: x / Bacteria: x      RADIOLOGY & ADDITIONAL STUDIES:   EKG: NSR, no ischemic changes, QTc 469  CXR: Hyperinflated lungs and prominent vasculature

## 2024-08-06 NOTE — DIETITIAN INITIAL EVALUATION ADULT - PERTINENT MEDS FT
MEDICATIONS  (STANDING):  dextrose 5%. 1000 milliLiter(s) (60 mL/Hr) IV Continuous <Continuous>  heparin   Injectable 5000 Unit(s) SubCutaneous every 12 hours  levothyroxine Injectable 17 MICROGram(s) IV Push at bedtime    MEDICATIONS  (PRN):

## 2024-08-06 NOTE — DIETITIAN INITIAL EVALUATION ADULT - ADD RECOMMEND
-Initiate enteral nutrition support via PEG once established and medically cleared   *Recommend Jevity 1.2 with goal rate of 47 ml/hr with LPS x1/day from 2236-3142 to provide 846 ml tube feed, 1115 calories, 62 gProt., and 683 ml free water. This is 22.5 nonprotein calories and 1.61 gProt. per kg current body weight 38.6 kg.   -Recommend initiate and advance tube feed slowly as pt is high risk for refeeding syndrome    *Monitor Phos, K, and Mg and replete as necessary   -Monitor tolerance to tube feed and adjust as necessary   -Align nutrition with goals of care at all times  -Weekly wts  -Monitor chemistry, GI function, and skin integrity

## 2024-08-06 NOTE — CONSULT NOTE ADULT - ATTENDING COMMENTS
Emily Farley   [id number]  As above, this is an elderly female with FTT, dysphagia, hypothyroidism who was sent for PEG placement and found to be severely dehydrated with hyponatremia.  She was on levofloxacin for about 7 days, (+)sacral ulcer without fever and leukocytosis.  -Failure to thrive  -severe protein calorie malnutrition  -hyponatremia with hypovolemia  -hypothyroidism  -dysphagia  -hyperosmolar state  >Correct the Na slowly to prevent CPM  >keep SO2 above 94%  >obtain advance directive  >PEG placement by GI  >check VBG  >D/C Levaquin determine the treatment course  >keep hgb above 7g/dL  Please see above for the details as d/w the house staff    Time spent >55 mins.

## 2024-08-06 NOTE — DIETITIAN INITIAL EVALUATION ADULT - OTHER CALCULATIONS
Needs determined using current body weight 38.6 kg (74%IBW) adjusted for protein-calorie malnutrition.

## 2024-08-06 NOTE — DIETITIAN INITIAL EVALUATION ADULT - OTHER INFO
89F PMH dementia, HTN, HLD, hypothyroidism presented to the ED for Failure To Thrive and PEG placement (pt of Dr. Loza) admitted to tele for Hypernatremia of 167 and PEG placement given Failure To Thrive.    Pt assessed at bedside on 7LA. Rx and labs reviewed. Pt presents with severe wasting of the orbital, temporal, buccal, and clavicular wasting; most of body covered with bedding. Spoke primarily with aide at bedside who reports usual body weight of 80's; current body weight of 85 pounds. Notes limited tolerance to food and inadequate PO intake; plan for PEG placement in setting of FTT. Pt meets ASPEN criteria for severe, chronic protein-calorie malnutrition in setting of severe muscle/fat wasting. No reported complaints of nausea/vomiting/constipation/diarrhea. NKA to food. RDN to remain available, see recommendations below.     Pain: No non-verbal indicators   GI: Abdomen non-distended/non-tender, +BS x4, last bowel movement PTA   Skin: pressure injury unstageable L hip, suspected deep tissue injury flank, pressure injury unstageable sacrum, suspected deep tissue injury bilateral heel, suspected deep tissue injury bilateral calf, no edema assessed

## 2024-08-06 NOTE — CONSULT NOTE ADULT - ASSESSMENT
89yo female with PMHx of dementia (A&Ox0 at baseline), HTN, HLD, hypothyroidism presents for possible PEG placement tomorrow iso dysphagia and FTT. ICU consulted for severe hypernatremia.     NEUROLOGY  #?Metabolic encephalopathy  According to family and HHA, patient at baseline now A&Ox0, non-communicative. May have component of altered mentation from hypernatremia vs hypothyroidism vs infection. No concern for sepsis at this time. No concern for toxic ingestion.  - Hypernatremia txt as below  - Check TSH, Syphilis, Ammonia  - Obtain further collateral    CARDIOVASCULAR  #HTN  #HLD  - Med rec    PULMONARY  #[facility]    GASTROINTESTINAL  #Dysphagia 2/2 dementia  #Failure to thrive  #Severe protein-calorie malnutrition  - NPO  - Nutrition consult  - GI consult for PEG placement    RENAL  #Hypernatremia  Likely 2/2 weeks of poor PO intake. Pt with 2-3L FWD. S/p 500cc NS x2 bolus.  - Obtain repeat BMP  - Goal Na is 159-161 in 24 hours  - Monitor UOP  - Bladder scans q6h  - F/u urine studies    INFECTIOUS DISEASE  #R/o infection  Patient was given 7 day course of Levaquin for lethargy with improvement. No source was identified. Not meeting SIRS criteria on admission.   - Monitor off abx for now  - Obtain collateral    ENDOCRINE  #Hypothyroidism  Takes Levothyroxine 25mcg qd at home.   - Check TSH  - Start Levothyroxine 17.7mcg IVP qhs  - Monitor FSGs q6h  - mISS q6h  - F/u A1c    HEME  #Microcytic anemia  Hbg 8.4, MCV 75. Unknown baseline. No sources of bleeding on exam.  - Transfuse for Hgb <7  - Maintain active T&S  - F/u iron studies & TSH  - Obtain collateral for baseline Hgb    PREVENTION:  F: S/p NS 500cc bolus x2  E: Monitor, Replete to K>4 and Mg>2  N: NPO pending PEG placement  DVT ppx: heparin subq  GI ppx: NI  CODE STATUS: FULL    DISPO: [facility]  Discussed with ICU attending Dr. Goodson.

## 2024-08-06 NOTE — DIETITIAN NUTRITION RISK NOTIFICATION - TREATMENT: THE FOLLOWING DIET HAS BEEN RECOMMENDED
-Initiate enteral nutrition support via PEG once established and medically cleared   *Recommend Jevity 1.2 with goal rate of 47 ml/hr with LPS x1/day from 5494-3107 to provide 846 ml tube feed, 1115 calories, 62 gProt., and 683 ml free water. This is 22.5 nonprotein calories and 1.61 gProt. per kg current body weight 38.6 kg.   -Recommend initiate and advance tube feed slowly as pt is high risk for refeeding syndrome    *Monitor Phos, K, and Mg and replete as necessary   -Monitor tolerance to tube feed and adjust as necessary   -Align nutrition with goals of care at all times  -Weekly wts  -Monitor chemistry, GI function, and skin integrity

## 2024-08-06 NOTE — PROVIDER CONTACT NOTE (CRITICAL VALUE NOTIFICATION) - ASSESSMENT
Pt completely disoriented, unable to answer questions, mumbled speech
Pt completely disoriented, unable to answer questions. Mumbled speech.
Pt completely disoriented. Unable to answer questions. No apparent signs of pain or distress.
Skin normal color for race, warm, dry and intact. No evidence of rash.

## 2024-08-07 DIAGNOSIS — F03.90 UNSPECIFIED DEMENTIA, UNSPECIFIED SEVERITY, WITHOUT BEHAVIORAL DISTURBANCE, PSYCHOTIC DISTURBANCE, MOOD DISTURBANCE, AND ANXIETY: ICD-10-CM

## 2024-08-07 DIAGNOSIS — D63.8 ANEMIA IN OTHER CHRONIC DISEASES CLASSIFIED ELSEWHERE: ICD-10-CM

## 2024-08-07 DIAGNOSIS — E87.0 HYPEROSMOLALITY AND HYPERNATREMIA: ICD-10-CM

## 2024-08-07 DIAGNOSIS — E78.5 HYPERLIPIDEMIA, UNSPECIFIED: ICD-10-CM

## 2024-08-07 DIAGNOSIS — R62.7 ADULT FAILURE TO THRIVE: ICD-10-CM

## 2024-08-07 DIAGNOSIS — E03.9 HYPOTHYROIDISM, UNSPECIFIED: ICD-10-CM

## 2024-08-07 DIAGNOSIS — Z29.9 ENCOUNTER FOR PROPHYLACTIC MEASURES, UNSPECIFIED: ICD-10-CM

## 2024-08-07 DIAGNOSIS — I10 ESSENTIAL (PRIMARY) HYPERTENSION: ICD-10-CM

## 2024-08-07 DIAGNOSIS — G93.41 METABOLIC ENCEPHALOPATHY: ICD-10-CM

## 2024-08-07 LAB
ALBUMIN SERPL ELPH-MCNC: 2.5 G/DL — LOW (ref 3.3–5)
ALBUMIN SERPL ELPH-MCNC: 3.3 G/DL — SIGNIFICANT CHANGE UP (ref 3.3–5)
ALP SERPL-CCNC: 150 U/L — HIGH (ref 40–120)
ALP SERPL-CCNC: 188 U/L — HIGH (ref 40–120)
ALT FLD-CCNC: 11 U/L — SIGNIFICANT CHANGE UP (ref 10–45)
ALT FLD-CCNC: 14 U/L — SIGNIFICANT CHANGE UP (ref 10–45)
ANION GAP SERPL CALC-SCNC: 10 MMOL/L — SIGNIFICANT CHANGE UP (ref 5–17)
ANION GAP SERPL CALC-SCNC: 11 MMOL/L — SIGNIFICANT CHANGE UP (ref 5–17)
ANION GAP SERPL CALC-SCNC: 7 MMOL/L — SIGNIFICANT CHANGE UP (ref 5–17)
ANION GAP SERPL CALC-SCNC: 7 MMOL/L — SIGNIFICANT CHANGE UP (ref 5–17)
ANION GAP SERPL CALC-SCNC: 8 MMOL/L — SIGNIFICANT CHANGE UP (ref 5–17)
ANISOCYTOSIS BLD QL: SIGNIFICANT CHANGE UP
ANISOCYTOSIS BLD QL: SIGNIFICANT CHANGE UP
ANISOCYTOSIS BLD QL: SLIGHT — SIGNIFICANT CHANGE UP
AST SERPL-CCNC: 20 U/L — SIGNIFICANT CHANGE UP (ref 10–40)
AST SERPL-CCNC: 24 U/L — SIGNIFICANT CHANGE UP (ref 10–40)
BASOPHILS # BLD AUTO: 0 K/UL — SIGNIFICANT CHANGE UP (ref 0–0.2)
BASOPHILS NFR BLD AUTO: 0 % — SIGNIFICANT CHANGE UP (ref 0–2)
BILIRUB SERPL-MCNC: 0.3 MG/DL — SIGNIFICANT CHANGE UP (ref 0.2–1.2)
BILIRUB SERPL-MCNC: 0.3 MG/DL — SIGNIFICANT CHANGE UP (ref 0.2–1.2)
BUN SERPL-MCNC: 21 MG/DL — SIGNIFICANT CHANGE UP (ref 7–23)
BUN SERPL-MCNC: 22 MG/DL — SIGNIFICANT CHANGE UP (ref 7–23)
BUN SERPL-MCNC: 25 MG/DL — HIGH (ref 7–23)
BUN SERPL-MCNC: 28 MG/DL — HIGH (ref 7–23)
BUN SERPL-MCNC: 31 MG/DL — HIGH (ref 7–23)
BURR CELLS BLD QL SMEAR: PRESENT — SIGNIFICANT CHANGE UP
CALCIUM SERPL-MCNC: 7.8 MG/DL — LOW (ref 8.4–10.5)
CALCIUM SERPL-MCNC: 7.9 MG/DL — LOW (ref 8.4–10.5)
CALCIUM SERPL-MCNC: 8 MG/DL — LOW (ref 8.4–10.5)
CALCIUM SERPL-MCNC: 8.5 MG/DL — SIGNIFICANT CHANGE UP (ref 8.4–10.5)
CALCIUM SERPL-MCNC: 8.5 MG/DL — SIGNIFICANT CHANGE UP (ref 8.4–10.5)
CHLORIDE SERPL-SCNC: 119 MMOL/L — HIGH (ref 96–108)
CHLORIDE SERPL-SCNC: 121 MMOL/L — HIGH (ref 96–108)
CHLORIDE SERPL-SCNC: 122 MMOL/L — HIGH (ref 96–108)
CHLORIDE SERPL-SCNC: 123 MMOL/L — HIGH (ref 96–108)
CHLORIDE SERPL-SCNC: 124 MMOL/L — HIGH (ref 96–108)
CO2 SERPL-SCNC: 26 MMOL/L — SIGNIFICANT CHANGE UP (ref 22–31)
CO2 SERPL-SCNC: 26 MMOL/L — SIGNIFICANT CHANGE UP (ref 22–31)
CO2 SERPL-SCNC: 27 MMOL/L — SIGNIFICANT CHANGE UP (ref 22–31)
CO2 SERPL-SCNC: 27 MMOL/L — SIGNIFICANT CHANGE UP (ref 22–31)
CO2 SERPL-SCNC: 28 MMOL/L — SIGNIFICANT CHANGE UP (ref 22–31)
CREAT SERPL-MCNC: 0.47 MG/DL — LOW (ref 0.5–1.3)
CREAT SERPL-MCNC: 0.49 MG/DL — LOW (ref 0.5–1.3)
CREAT SERPL-MCNC: 0.5 MG/DL — SIGNIFICANT CHANGE UP (ref 0.5–1.3)
CREAT SERPL-MCNC: 0.52 MG/DL — SIGNIFICANT CHANGE UP (ref 0.5–1.3)
CREAT SERPL-MCNC: 0.52 MG/DL — SIGNIFICANT CHANGE UP (ref 0.5–1.3)
DACRYOCYTES BLD QL SMEAR: SLIGHT — SIGNIFICANT CHANGE UP
DACRYOCYTES BLD QL SMEAR: SLIGHT — SIGNIFICANT CHANGE UP
EGFR: 89 ML/MIN/1.73M2 — SIGNIFICANT CHANGE UP
EGFR: 89 ML/MIN/1.73M2 — SIGNIFICANT CHANGE UP
EGFR: 90 ML/MIN/1.73M2 — SIGNIFICANT CHANGE UP
EGFR: 90 ML/MIN/1.73M2 — SIGNIFICANT CHANGE UP
EGFR: 91 ML/MIN/1.73M2 — SIGNIFICANT CHANGE UP
EOSINOPHIL # BLD AUTO: 0 K/UL — SIGNIFICANT CHANGE UP (ref 0–0.5)
EOSINOPHIL # BLD AUTO: 0 K/UL — SIGNIFICANT CHANGE UP (ref 0–0.5)
EOSINOPHIL # BLD AUTO: 0.07 K/UL — SIGNIFICANT CHANGE UP (ref 0–0.5)
EOSINOPHIL NFR BLD AUTO: 0 % — SIGNIFICANT CHANGE UP (ref 0–6)
EOSINOPHIL NFR BLD AUTO: 0 % — SIGNIFICANT CHANGE UP (ref 0–6)
EOSINOPHIL NFR BLD AUTO: 1 % — SIGNIFICANT CHANGE UP (ref 0–6)
FERRITIN SERPL-MCNC: 43 NG/ML — SIGNIFICANT CHANGE UP (ref 13–330)
GIANT PLATELETS BLD QL SMEAR: PRESENT — SIGNIFICANT CHANGE UP
GLUCOSE BLDC GLUCOMTR-MCNC: 138 MG/DL — HIGH (ref 70–99)
GLUCOSE BLDC GLUCOMTR-MCNC: 156 MG/DL — HIGH (ref 70–99)
GLUCOSE BLDC GLUCOMTR-MCNC: 72 MG/DL — SIGNIFICANT CHANGE UP (ref 70–99)
GLUCOSE BLDC GLUCOMTR-MCNC: 80 MG/DL — SIGNIFICANT CHANGE UP (ref 70–99)
GLUCOSE BLDC GLUCOMTR-MCNC: 83 MG/DL — SIGNIFICANT CHANGE UP (ref 70–99)
GLUCOSE BLDC GLUCOMTR-MCNC: 85 MG/DL — SIGNIFICANT CHANGE UP (ref 70–99)
GLUCOSE SERPL-MCNC: 102 MG/DL — HIGH (ref 70–99)
GLUCOSE SERPL-MCNC: 214 MG/DL — HIGH (ref 70–99)
GLUCOSE SERPL-MCNC: 82 MG/DL — SIGNIFICANT CHANGE UP (ref 70–99)
GLUCOSE SERPL-MCNC: 92 MG/DL — SIGNIFICANT CHANGE UP (ref 70–99)
GLUCOSE SERPL-MCNC: 96 MG/DL — SIGNIFICANT CHANGE UP (ref 70–99)
HCT VFR BLD CALC: 29.3 % — LOW (ref 34.5–45)
HCT VFR BLD CALC: 32.8 % — LOW (ref 34.5–45)
HCT VFR BLD CALC: 33 % — LOW (ref 34.5–45)
HGB BLD-MCNC: 7.7 G/DL — LOW (ref 11.5–15.5)
HGB BLD-MCNC: 8.2 G/DL — LOW (ref 11.5–15.5)
HGB BLD-MCNC: 8.4 G/DL — LOW (ref 11.5–15.5)
HYPOCHROMIA BLD QL: SLIGHT — SIGNIFICANT CHANGE UP
IRON SATN MFR SERPL: 311 UG/DL — HIGH (ref 30–160)
IRON SATN MFR SERPL: SIGNIFICANT CHANGE UP % (ref 14–50)
LYMPHOCYTES # BLD AUTO: 0.99 K/UL — LOW (ref 1–3.3)
LYMPHOCYTES # BLD AUTO: 1.57 K/UL — SIGNIFICANT CHANGE UP (ref 1–3.3)
LYMPHOCYTES # BLD AUTO: 1.73 K/UL — SIGNIFICANT CHANGE UP (ref 1–3.3)
LYMPHOCYTES # BLD AUTO: 15.2 % — SIGNIFICANT CHANGE UP (ref 13–44)
LYMPHOCYTES # BLD AUTO: 16.7 % — SIGNIFICANT CHANGE UP (ref 13–44)
LYMPHOCYTES # BLD AUTO: 26.4 % — SIGNIFICANT CHANGE UP (ref 13–44)
MAGNESIUM SERPL-MCNC: 2.4 MG/DL — SIGNIFICANT CHANGE UP (ref 1.6–2.6)
MAGNESIUM SERPL-MCNC: 2.5 MG/DL — SIGNIFICANT CHANGE UP (ref 1.6–2.6)
MANUAL SMEAR VERIFICATION: SIGNIFICANT CHANGE UP
MCHC RBC-ENTMCNC: 18.5 PG — LOW (ref 27–34)
MCHC RBC-ENTMCNC: 19.2 PG — LOW (ref 27–34)
MCHC RBC-ENTMCNC: 19.3 PG — LOW (ref 27–34)
MCHC RBC-ENTMCNC: 24.8 GM/DL — LOW (ref 32–36)
MCHC RBC-ENTMCNC: 25.6 GM/DL — LOW (ref 32–36)
MCHC RBC-ENTMCNC: 26.3 GM/DL — LOW (ref 32–36)
MCV RBC AUTO: 73.3 FL — LOW (ref 80–100)
MCV RBC AUTO: 74.5 FL — LOW (ref 80–100)
MCV RBC AUTO: 74.9 FL — LOW (ref 80–100)
MICROCYTES BLD QL: SIGNIFICANT CHANGE UP
MICROCYTES BLD QL: SIGNIFICANT CHANGE UP
MICROCYTES BLD QL: SLIGHT — SIGNIFICANT CHANGE UP
MONOCYTES # BLD AUTO: 0.06 K/UL — SIGNIFICANT CHANGE UP (ref 0–0.9)
MONOCYTES # BLD AUTO: 0.08 K/UL — SIGNIFICANT CHANGE UP (ref 0–0.9)
MONOCYTES # BLD AUTO: 0.18 K/UL — SIGNIFICANT CHANGE UP (ref 0–0.9)
MONOCYTES NFR BLD AUTO: 0.9 % — LOW (ref 2–14)
MONOCYTES NFR BLD AUTO: 0.9 % — LOW (ref 2–14)
MONOCYTES NFR BLD AUTO: 2.8 % — SIGNIFICANT CHANGE UP (ref 2–14)
NEUTROPHILS # BLD AUTO: 4.58 K/UL — SIGNIFICANT CHANGE UP (ref 1.8–7.4)
NEUTROPHILS # BLD AUTO: 5.49 K/UL — SIGNIFICANT CHANGE UP (ref 1.8–7.4)
NEUTROPHILS # BLD AUTO: 7.69 K/UL — HIGH (ref 1.8–7.4)
NEUTROPHILS NFR BLD AUTO: 69.8 % — SIGNIFICANT CHANGE UP (ref 43–77)
NEUTROPHILS NFR BLD AUTO: 81.5 % — HIGH (ref 43–77)
NEUTROPHILS NFR BLD AUTO: 83.9 % — HIGH (ref 43–77)
NRBC # BLD: 1 /100 WBCS — HIGH (ref 0–0)
NRBC # BLD: 1 /100 WBCS — HIGH (ref 0–0)
NRBC # BLD: 2 /100 WBCS — HIGH (ref 0–0)
NRBC # BLD: SIGNIFICANT CHANGE UP /100 WBCS (ref 0–0)
OVALOCYTES BLD QL SMEAR: SIGNIFICANT CHANGE UP
OVALOCYTES BLD QL SMEAR: SLIGHT — SIGNIFICANT CHANGE UP
OVALOCYTES BLD QL SMEAR: SLIGHT — SIGNIFICANT CHANGE UP
PHOSPHATE SERPL-MCNC: 2.4 MG/DL — LOW (ref 2.5–4.5)
PHOSPHATE SERPL-MCNC: 3.1 MG/DL — SIGNIFICANT CHANGE UP (ref 2.5–4.5)
PLAT MORPH BLD: ABNORMAL
PLATELET # BLD AUTO: 179 K/UL — SIGNIFICANT CHANGE UP (ref 150–400)
PLATELET # BLD AUTO: 193 K/UL — SIGNIFICANT CHANGE UP (ref 150–400)
PLATELET # BLD AUTO: 205 K/UL — SIGNIFICANT CHANGE UP (ref 150–400)
POIKILOCYTOSIS BLD QL AUTO: SIGNIFICANT CHANGE UP
POIKILOCYTOSIS BLD QL AUTO: SLIGHT — SIGNIFICANT CHANGE UP
POIKILOCYTOSIS BLD QL AUTO: SLIGHT — SIGNIFICANT CHANGE UP
POLYCHROMASIA BLD QL SMEAR: SLIGHT — SIGNIFICANT CHANGE UP
POLYCHROMASIA BLD QL SMEAR: SLIGHT — SIGNIFICANT CHANGE UP
POTASSIUM SERPL-MCNC: 3.5 MMOL/L — SIGNIFICANT CHANGE UP (ref 3.5–5.3)
POTASSIUM SERPL-MCNC: 3.5 MMOL/L — SIGNIFICANT CHANGE UP (ref 3.5–5.3)
POTASSIUM SERPL-MCNC: 3.7 MMOL/L — SIGNIFICANT CHANGE UP (ref 3.5–5.3)
POTASSIUM SERPL-MCNC: 3.8 MMOL/L — SIGNIFICANT CHANGE UP (ref 3.5–5.3)
POTASSIUM SERPL-MCNC: 3.8 MMOL/L — SIGNIFICANT CHANGE UP (ref 3.5–5.3)
POTASSIUM SERPL-SCNC: 3.5 MMOL/L — SIGNIFICANT CHANGE UP (ref 3.5–5.3)
POTASSIUM SERPL-SCNC: 3.5 MMOL/L — SIGNIFICANT CHANGE UP (ref 3.5–5.3)
POTASSIUM SERPL-SCNC: 3.7 MMOL/L — SIGNIFICANT CHANGE UP (ref 3.5–5.3)
POTASSIUM SERPL-SCNC: 3.8 MMOL/L — SIGNIFICANT CHANGE UP (ref 3.5–5.3)
POTASSIUM SERPL-SCNC: 3.8 MMOL/L — SIGNIFICANT CHANGE UP (ref 3.5–5.3)
PROT SERPL-MCNC: 5.3 G/DL — LOW (ref 6–8.3)
PROT SERPL-MCNC: 6.7 G/DL — SIGNIFICANT CHANGE UP (ref 6–8.3)
RBC # BLD: 4 M/UL — SIGNIFICANT CHANGE UP (ref 3.8–5.2)
RBC # BLD: 4.38 M/UL — SIGNIFICANT CHANGE UP (ref 3.8–5.2)
RBC # BLD: 4.43 M/UL — SIGNIFICANT CHANGE UP (ref 3.8–5.2)
RBC # FLD: 23.7 % — HIGH (ref 10.3–14.5)
RBC # FLD: 23.9 % — HIGH (ref 10.3–14.5)
RBC # FLD: 23.9 % — HIGH (ref 10.3–14.5)
RBC BLD AUTO: ABNORMAL
SCHISTOCYTES BLD QL AUTO: SLIGHT — SIGNIFICANT CHANGE UP
SCHISTOCYTES BLD QL AUTO: SLIGHT — SIGNIFICANT CHANGE UP
SMUDGE CELLS # BLD: PRESENT — SIGNIFICANT CHANGE UP
SMUDGE CELLS # BLD: PRESENT — SIGNIFICANT CHANGE UP
SODIUM SERPL-SCNC: 152 MMOL/L — HIGH (ref 135–145)
SODIUM SERPL-SCNC: 157 MMOL/L — HIGH (ref 135–145)
SODIUM SERPL-SCNC: 157 MMOL/L — HIGH (ref 135–145)
SODIUM SERPL-SCNC: 158 MMOL/L — HIGH (ref 135–145)
SODIUM SERPL-SCNC: 162 MMOL/L — CRITICAL HIGH (ref 135–145)
SPHEROCYTES BLD QL SMEAR: SLIGHT — SIGNIFICANT CHANGE UP
T PALLIDUM AB TITR SER: NEGATIVE — SIGNIFICANT CHANGE UP
TIBC SERPL-MCNC: SIGNIFICANT CHANGE UP UG/DL (ref 220–430)
UIBC SERPL-MCNC: <16.8 UG/DL — SIGNIFICANT CHANGE UP (ref 110–370)
VARIANT LYMPHS # BLD: 0.9 % — SIGNIFICANT CHANGE UP (ref 0–6)
WBC # BLD: 6.54 K/UL — SIGNIFICANT CHANGE UP (ref 3.8–10.5)
WBC # BLD: 6.56 K/UL — SIGNIFICANT CHANGE UP (ref 3.8–10.5)
WBC # BLD: 9.43 K/UL — SIGNIFICANT CHANGE UP (ref 3.8–10.5)
WBC # FLD AUTO: 6.54 K/UL — SIGNIFICANT CHANGE UP (ref 3.8–10.5)
WBC # FLD AUTO: 6.56 K/UL — SIGNIFICANT CHANGE UP (ref 3.8–10.5)
WBC # FLD AUTO: 9.43 K/UL — SIGNIFICANT CHANGE UP (ref 3.8–10.5)

## 2024-08-07 PROCEDURE — 99233 SBSQ HOSP IP/OBS HIGH 50: CPT

## 2024-08-07 RX ORDER — IRON SUCROSE 20 MG/ML
300 INJECTION, SOLUTION INTRAVENOUS EVERY 24 HOURS
Refills: 0 | Status: COMPLETED | OUTPATIENT
Start: 2024-08-07 | End: 2024-08-08

## 2024-08-07 RX ORDER — DEXTROSE 4 G
25 TABLET,CHEWABLE ORAL ONCE
Refills: 0 | Status: COMPLETED | OUTPATIENT
Start: 2024-08-07 | End: 2024-08-07

## 2024-08-07 RX ORDER — POTASSIUM PHOSPHATE, MONOBASIC AND POTASSIUM PHOSPHATE, DIBASIC 224; 236 MG/ML; MG/ML
15 INJECTION, SOLUTION INTRAVENOUS ONCE
Refills: 0 | Status: COMPLETED | OUTPATIENT
Start: 2024-08-07 | End: 2024-08-07

## 2024-08-07 RX ORDER — DEXTROSE MONOHYDRATE, SODIUM CHLORIDE, SODIUM LACTATE, CALCIUM CHLORIDE, MAGNESIUM CHLORIDE 1.5; 538; 448; 18.4; 5.08 G/100ML; MG/100ML; MG/100ML; MG/100ML; MG/100ML
1000 SOLUTION INTRAPERITONEAL
Refills: 0 | Status: DISCONTINUED | OUTPATIENT
Start: 2024-08-07 | End: 2024-08-07

## 2024-08-07 RX ORDER — BACTERIOSTATIC SODIUM CHLORIDE 0.9 %
250 VIAL (ML) INJECTION ONCE
Refills: 0 | Status: COMPLETED | OUTPATIENT
Start: 2024-08-07 | End: 2024-08-07

## 2024-08-07 RX ADMIN — Medication 17 MICROGRAM(S): at 23:56

## 2024-08-07 RX ADMIN — POTASSIUM PHOSPHATE, MONOBASIC AND POTASSIUM PHOSPHATE, DIBASIC 62.5 MILLIMOLE(S): 224; 236 INJECTION, SOLUTION INTRAVENOUS at 09:53

## 2024-08-07 RX ADMIN — Medication 250 MILLILITER(S): at 00:33

## 2024-08-07 RX ADMIN — IRON SUCROSE 176.67 MILLIGRAM(S): 20 INJECTION, SOLUTION INTRAVENOUS at 23:57

## 2024-08-07 RX ADMIN — HEPARIN SODIUM 5000 UNIT(S): 1000 INJECTION, SOLUTION INTRAVENOUS; SUBCUTANEOUS at 18:37

## 2024-08-07 RX ADMIN — HEPARIN SODIUM 5000 UNIT(S): 1000 INJECTION, SOLUTION INTRAVENOUS; SUBCUTANEOUS at 05:03

## 2024-08-07 RX ADMIN — Medication 25 MILLILITER(S): at 15:40

## 2024-08-07 RX ADMIN — DEXTROSE MONOHYDRATE, SODIUM CHLORIDE, SODIUM LACTATE, CALCIUM CHLORIDE, MAGNESIUM CHLORIDE 55 MILLILITER(S): 1.5; 538; 448; 18.4; 5.08 SOLUTION INTRAPERITONEAL at 04:55

## 2024-08-07 RX ADMIN — IRON SUCROSE 176.67 MILLIGRAM(S): 20 INJECTION, SOLUTION INTRAVENOUS at 01:15

## 2024-08-07 NOTE — PROGRESS NOTE ADULT - PROBLEM SELECTOR PLAN 2
#Dysphagia   Has ~1w of decreased PO intake. Also seems to aspirate/cough every time she tries to eat.  Family was in touch w/ Stefano Carter/Dr. Loza, was referred to ED for PEG    Plan:  - Pending PEG placement once hypernatremia resolved  - Reach out to GI and f/u PEG placement timing  - NPO until procedure  - glucose checks q4, if glucose <70 can give amp

## 2024-08-07 NOTE — PROGRESS NOTE ADULT - ASSESSMENT
89F PMH dementia (axo at baseline), HTN, HLD, hypothyroidism presented to the ED for PEG placement due to dysphagia, resulting in FTT(patient of Dr. Loza) admitted to telemetry for management acute metabolic encephalopathy 2/2 to hypernatremia (Na 167) due to decreased oral intake and subsequent PEG placement.

## 2024-08-07 NOTE — PROGRESS NOTE ADULT - PROBLEM SELECTOR PLAN 3
Likely 2/2 decrease PO intake   On presentation pt noted with Na 167, reduced today to 164 with D5W    Plan:  - free water deficit 0.9L  - c/w D5W 55 mL/hr  - Correct sodium slowly -> decrease by 8 in 24 hours  - BMP Q4h  - Monitor UO  - Q6h BS  - Strict I&O's

## 2024-08-07 NOTE — PROGRESS NOTE ADULT - SUBJECTIVE AND OBJECTIVE BOX
Pt seen and examined   alert  looking aound  non verbal    REVIEW OF SYSTEMS:  dementia    MEDICATIONS:  MEDICATIONS  (STANDING):  dextrose 5%. 1000 milliLiter(s) (55 mL/Hr) IV Continuous <Continuous>  heparin   Injectable 5000 Unit(s) SubCutaneous every 12 hours  iron sucrose IVPB 300 milliGRAM(s) IV Intermittent every 24 hours  levothyroxine Injectable 17 MICROGram(s) IV Push at bedtime    MEDICATIONS  (PRN):      Allergies    No Known Allergies    Intolerances        Vital Signs Last 24 Hrs  T(C): 36.5 (07 Aug 2024 10:00), Max: 36.9 (07 Aug 2024 01:00)  T(F): 97.7 (07 Aug 2024 10:00), Max: 98.4 (07 Aug 2024 01:00)  HR: 76 (07 Aug 2024 08:16) (68 - 84)  BP: 92/54 (07 Aug 2024 08:16) (80/52 - 109/56)  BP(mean): 67 (07 Aug 2024 08:16) (62 - 77)  RR: 16 (07 Aug 2024 08:16) (16 - 18)  SpO2: 96% (07 Aug 2024 08:16) (95% - 100%)    Parameters below as of 07 Aug 2024 08:16  Patient On (Oxygen Delivery Method): room air        08-06 @ 07:01  -  08-07 @ 07:00  --------------------------------------------------------  IN: 1495 mL / OUT: 500 mL / NET: 995 mL        PHYSICAL EXAM:    General:   in no acute distress  HEENT: MMM, conjunctiva and sclera clear  Gastrointestinal: Soft non-tender non-distended; Normal bowel sounds;    Skin: Warm and dry. No obvious rash    LABS:      CBC Full  -  ( 07 Aug 2024 11:40 )  WBC Count : 6.56 K/uL  RBC Count : 4.00 M/uL  Hemoglobin : 7.7 g/dL  Hematocrit : 29.3 %  Platelet Count - Automated : 179 K/uL  Mean Cell Volume : 73.3 fl  Mean Cell Hemoglobin : 19.3 pg  Mean Cell Hemoglobin Concentration : 26.3 gm/dL  Auto Neutrophil # : x  Auto Lymphocyte # : x  Auto Monocyte # : x  Auto Eosinophil # : x  Auto Basophil # : x  Auto Neutrophil % : x  Auto Lymphocyte % : x  Auto Monocyte % : x  Auto Eosinophil % : x  Auto Basophil % : x    08-07    162<HH>  |  124<H>  |  28<H>  ----------------------------<  82  3.8   |  27  |  0.52    Ca    8.5      07 Aug 2024 03:53  Phos  3.1     08-07  Mg     2.4     08-07    TPro  5.0<L>  /  Alb  2.5<L>  /  TBili  0.2  /  DBili  x   /  AST  14  /  ALT  9<L>  /  AlkPhos  128<H>  08-06    PT/INR - ( 05 Aug 2024 21:26 )   PT: 11.7 sec;   INR: 1.03          PTT - ( 05 Aug 2024 21:26 )  PTT:30.2 sec      Urinalysis Basic - ( 07 Aug 2024 03:53 )    Color: x / Appearance: x / SG: x / pH: x  Gluc: 82 mg/dL / Ketone: x  / Bili: x / Urobili: x   Blood: x / Protein: x / Nitrite: x   Leuk Esterase: x / RBC: x / WBC x   Sq Epi: x / Non Sq Epi: x / Bacteria: x                RADIOLOGY & ADDITIONAL STUDIES (The following images were personally reviewed):

## 2024-08-07 NOTE — PROGRESS NOTE ADULT - ASSESSMENT
89F PMH dementia, HTN, HLD, hypothyroidism presented to the ED for Failure To Thrive and PEG placement (pt of Dr. Loza) admitted to tele for Hypernatremia of 167 and PEG placement given Failure To Thrive.    NEURO  At baseline A/O x 0    #Acute Metabolic Encephalopathy   #R/O possible hyperosmolar coma   - hyperosmolar coma possible given elevated serum osmo. patient also severely hypernatremic. AMS likely multifactorial iso of poor PO intake, advanced dementia, hyperosmolarity with significant hypernatremia. Free T4 wnl. ammonia <10.     Plan:  -Treat underlying hypernatremia as below  - f/u syphilis    #Dementia  Home meds: Donepezil 10mg , Memantine 20mg, Trazadone 50mg, Quetiapine 25mg   -Confirm home meds      CARDIO  #HTN  - currently normotensive  -Get med rec, nothing on superscripts     #HLD  Home med: Atorvastatin 10mg   - Confirm home med      PULM  -SpO2 >94%  -F/u VBG with lytes       GI  #Failure To Thrive  #Dysphagia   Has ~1w of decreased PO intake. Also seems to aspirate/cough every time she tries to eat.  Family was in touch w/ Stefano Carter/Dr. Loza, was referred to ED for PEG    Plan:  -Pending PEG placement once hypernatremia resolved  -Reach out to GI and f/u PEG placement timing  -NPO       RENAL  #Hypernatremia likely 2/2 decrease PO intake   On presentation pt noted with Na 167, reduced today to 164 with D5W    Plan:  - free water deficit 0.9L  - c/w D5W 60 mL/hr  -Correct sodium slowly -> decrease by 8 in 24 hours  -BMP Q4h  -Monitor UO  -Q6h BS  -Strict I&O's       ENDO  #Hypothyroidism   Home med: levothyroxine 25mcg  - TSH elevated at 4.7, free T4 wnl    Plan;  - converted to IV levothyroxine 17mcg    ID  #R/O Possible infection   Pt was recently started on Levaquin outpatient, unclear for what type of infection   No leukocytosis since admission, A-febrile    Plan;  -Continue to monitor off Antibiotics, watch for any signs of infection   -F/u UA      HEME  #Anemia of chronic disease  Hgb 7.8, MCV 75  ferritin 20, total iron 17, % sat 8, total binding capacity wnl.       Plan:  -Maintain active T/S  - treat underlying medical conditions      PROPHYLAXIS  F: 1/2 NS @ 60ml/hr  E: Replete PRN  N: NPO-pending PEG placement   DVT: Hold off for now  DISPO: 7 [facility]   89F PMH dementia (axo at baseline), HTN, HLD, hypothyroidism presented to the ED for PEG placement due to dysphagia, resulting in FTT(patient of Dr. Loza) admitted to telemetry for management acute metabolic encephalopathy 2/2 to hypernatremia (Na 167) due to decreased oral intake and subsequent PEG placement.    NEURO  At baseline A/O x 0    #Acute Metabolic Encephalopathy   #R/O possible hyperosmolar coma   - hyperosmolar coma possible given elevated serum osmo. patient also severely hypernatremic. AMS likely multifactorial iso of poor PO intake, advanced dementia, hyperosmolarity with significant hypernatremia. Free T4 wnl. ammonia <10.     Plan:  -Treat underlying hypernatremia as below      #Dementia  Confirm home meds - Donepezil 10mg , Memantine 20mg, Trazadone 50mg, Quetiapine 25mg   -holding oral medications (NPO_      CARDIO  #HTN  - currently normotensive  -Get med rec, nothing on superscripts     #HLD  Home med: Atorvastatin 10mg   - Confirm home med      PULM  -SpO2 >94%        GI  #Failure To Thrive  #Dysphagia   Has ~1w of decreased PO intake. Also seems to aspirate/cough every time she tries to eat.  Family was in touch w/ Stefano Carter/Dr. Loza, was referred to ED for PEG    Plan:  - Pending PEG placement once hypernatremia resolved  -Reach out to GI and f/u PEG placement timing  -NPO       RENAL  #Hypernatremia likely 2/2 decrease PO intake   On presentation pt noted with Na 167, reduced today to 164 with D5W    Plan:  - free water deficit 0.9L  - c/w D5W 55 mL/hr  -Correct sodium slowly -> decrease by 8 in 24 hours  -BMP Q4h  -Monitor UO  -Q6h BS  -Strict I&O's       ENDO  #Hypothyroidism   Home med: levothyroxine 25mcg  - TSH elevated at 4.7, free T4 wnl    Plan;  - c/w IV levothyroxine 17mcg    ID  #R/O Possible infection   Pt was recently started on Levaquin outpatient, unclear for what type of infection   No leukocytosis since admission, A-febrile    Plan;  -Continue to monitor off Antibiotics, watch for any signs of infection   -F/u UA (pending collection)      HEME  #Anemia of chronic disease  Hgb 7.8, MCV 75  ferritin 20, total iron 17, % sat 8, total binding capacity wnl.       Plan:  - c/w iron sucrose IVPB  -Maintain active T/S  - treat underlying medical conditions      PROPHYLAXIS  F: D5W @ 55ml/hr  E: Replete PRN  N: NPO-pending PEG placement   DVT: heparin subq   DISPO: [facility]

## 2024-08-07 NOTE — PROGRESS NOTE ADULT - SUBJECTIVE AND OBJECTIVE BOX
---------------------Transfer from [facility] to [facility]--------------------------    HOSPITAL COURSE: 89F PMH dementia (axo at baseline), HTN, HLD, hypothyroidism presented to the ED for PEG placement due to dysphagia, resulting in FTT(patient of Dr. Loza) admitted to telemetry for management acute metabolic encephalopathy 2/2 to hypernatremia (Na 167) due to decreased oral intake and subsequent PEG placement. Hypernatremia treated with D5W, currently on 55mL/hr, Na 157 this morning.     SUBJECTIVE / INTERVAL HPI: Patient seen and examined at bedside.     PHYSICAL EXAM:    General: Lying comfortably and in no acute distress  HEENT: NC/AT; EOMI, anicteric sclera; MMM  Neck: supple  Cardiovascular: +S1/S2, RRR  Respiratory: CTA B/L; no W/R/R  Gastrointestinal: soft, NT/ND; +BSx4  Extremities: WWP; no edema, clubbing or cyanosis  Vascular: 2+ radial pulses B/L  Neurological: AAOx3; no focal deficits  Psychiatric: pleasant mood and affect  Dermatologic: no appreciable wounds or damage to the skin    VITAL SIGNS:  Vital Signs Last 24 Hrs  T(C): 36.9 (07 Aug 2024 16:20), Max: 36.9 (07 Aug 2024 01:00)  T(F): 98.4 (07 Aug 2024 16:20), Max: 98.5 (07 Aug 2024 14:00)  HR: 77 (07 Aug 2024 16:20) (68 - 88)  BP: 104/65 (07 Aug 2024 16:20) (80/52 - 117/55)  BP(mean): 78 (07 Aug 2024 15:10) (62 - 78)  RR: 17 (07 Aug 2024 16:20) (16 - 18)  SpO2: 95% (07 Aug 2024 16:20) (95% - 100%)    Parameters below as of 07 Aug 2024 16:20  Patient On (Oxygen Delivery Method): room air          MEDICATIONS:  MEDICATIONS  (STANDING):  dextrose 5%. 1000 milliLiter(s) (55 mL/Hr) IV Continuous <Continuous>  heparin   Injectable 5000 Unit(s) SubCutaneous every 12 hours  iron sucrose IVPB 300 milliGRAM(s) IV Intermittent every 24 hours  levothyroxine Injectable 17 MICROGram(s) IV Push at bedtime    MEDICATIONS  (PRN):      ALLERGIES:  Allergies    No Known Allergies    Intolerances        LABS:                        7.7    6.56  )-----------( 179      ( 07 Aug 2024 11:40 )             29.3     08-07    157<H>  |  121<H>  |  22  ----------------------------<  96  3.7   |  26  |  0.47<L>    Ca    8.0<L>      07 Aug 2024 11:40  Phos  3.1     08-07  Mg     2.4     08-07    TPro  5.3<L>  /  Alb  2.5<L>  /  TBili  0.3  /  DBili  x   /  AST  20  /  ALT  11  /  AlkPhos  150<H>  08-07    PT/INR - ( 05 Aug 2024 21:26 )   PT: 11.7 sec;   INR: 1.03          PTT - ( 05 Aug 2024 21:26 )  PTT:30.2 sec  Urinalysis Basic - ( 07 Aug 2024 11:40 )    Color: x / Appearance: x / SG: x / pH: x  Gluc: 96 mg/dL / Ketone: x  / Bili: x / Urobili: x   Blood: x / Protein: x / Nitrite: x   Leuk Esterase: x / RBC: x / WBC x   Sq Epi: x / Non Sq Epi: x / Bacteria: x      CAPILLARY BLOOD GLUCOSE      POCT Blood Glucose.: 138 mg/dL (07 Aug 2024 15:53)      RADIOLOGY & ADDITIONAL TESTS: Reviewed. ---------------------Transfer from [facility] to [facility]--------------------------    HOSPITAL COURSE: 89F PMH dementia (axo at baseline), HTN, HLD, hypothyroidism presented to the ED for PEG placement due to dysphagia, resulting in FTT(patient of Dr. Loza) admitted to telemetry for management acute metabolic encephalopathy 2/2 to hypernatremia (Na 167) due to decreased oral intake and subsequent PEG placement. Hypernatremia treated with D5W, currently on 55mL/hr, Na 157 this morning.     SUBJECTIVE / INTERVAL HPI: Patient seen and examined at bedside. Pt not following verbal prompts on exam.     PHYSICAL EXAM:    General: Lying comfortably and in no acute distress  HEENT: NC/AT; EOMI, anicteric sclera; MMM  Neck: supple  Cardiovascular: +S1/S2, RRR  Respiratory: CTA B/L; no W/R/R  Gastrointestinal: soft, NT/ND; +BSx4  Extremities: WWP; no edema, clubbing or cyanosis  Vascular: 2+ radial pulses B/L  Neurological: AAOx0; no focal deficits    VITAL SIGNS:  Vital Signs Last 24 Hrs  T(C): 36.9 (07 Aug 2024 16:20), Max: 36.9 (07 Aug 2024 01:00)  T(F): 98.4 (07 Aug 2024 16:20), Max: 98.5 (07 Aug 2024 14:00)  HR: 77 (07 Aug 2024 16:20) (68 - 88)  BP: 104/65 (07 Aug 2024 16:20) (80/52 - 117/55)  BP(mean): 78 (07 Aug 2024 15:10) (62 - 78)  RR: 17 (07 Aug 2024 16:20) (16 - 18)  SpO2: 95% (07 Aug 2024 16:20) (95% - 100%)    Parameters below as of 07 Aug 2024 16:20  Patient On (Oxygen Delivery Method): room air          MEDICATIONS:  MEDICATIONS  (STANDING):  dextrose 5%. 1000 milliLiter(s) (55 mL/Hr) IV Continuous <Continuous>  heparin   Injectable 5000 Unit(s) SubCutaneous every 12 hours  iron sucrose IVPB 300 milliGRAM(s) IV Intermittent every 24 hours  levothyroxine Injectable 17 MICROGram(s) IV Push at bedtime    MEDICATIONS  (PRN):      ALLERGIES:  Allergies    No Known Allergies    Intolerances        LABS:                        7.7    6.56  )-----------( 179      ( 07 Aug 2024 11:40 )             29.3     08-07    157<H>  |  121<H>  |  22  ----------------------------<  96  3.7   |  26  |  0.47<L>    Ca    8.0<L>      07 Aug 2024 11:40  Phos  3.1     08-07  Mg     2.4     08-07    TPro  5.3<L>  /  Alb  2.5<L>  /  TBili  0.3  /  DBili  x   /  AST  20  /  ALT  11  /  AlkPhos  150<H>  08-07    PT/INR - ( 05 Aug 2024 21:26 )   PT: 11.7 sec;   INR: 1.03          PTT - ( 05 Aug 2024 21:26 )  PTT:30.2 sec  Urinalysis Basic - ( 07 Aug 2024 11:40 )    Color: x / Appearance: x / SG: x / pH: x  Gluc: 96 mg/dL / Ketone: x  / Bili: x / Urobili: x   Blood: x / Protein: x / Nitrite: x   Leuk Esterase: x / RBC: x / WBC x   Sq Epi: x / Non Sq Epi: x / Bacteria: x      CAPILLARY BLOOD GLUCOSE      POCT Blood Glucose.: 138 mg/dL (07 Aug 2024 15:53)      RADIOLOGY & ADDITIONAL TESTS: Reviewed.

## 2024-08-07 NOTE — PROGRESS NOTE ADULT - SUBJECTIVE AND OBJECTIVE BOX
**INCOMPLETE NOTE    INTERVAL/OVERNIGHT EVENTS: None    SUBJECTIVE:  Patient seen and examined at bedside, comfortable, NAD. Denied fever, chest pain, dyspnea, abdominal pain.     Vital Signs Last 12 Hrs  T(F): 97.5 (08-07-24 @ 07:30), Max: 98.4 (08-07-24 @ 01:00)  HR: 82 (08-07-24 @ 04:00) (70 - 84)  BP: 101/50 (08-07-24 @ 04:00) (80/52 - 101/50)  BP(mean): 71 (08-07-24 @ 04:00) (62 - 71)  RR: 18 (08-07-24 @ 04:00) (18 - 18)  SpO2: 98% (08-07-24 @ 04:00) (98% - 100%)  I&O's Summary    06 Aug 2024 07:01  -  07 Aug 2024 07:00  --------------------------------------------------------  IN: 1495 mL / OUT: 500 mL / NET: 995 mL        PHYSICAL EXAM:  General: NAD  HEENT: PERRL, EOM intact, sclera anicteric, MMM  Cardiovascular: RRR; no MRG; no JVD  Respiratory: CTAB; no WRR  GI/: soft; NTND; BS x4  Extremities: WWP; 2+ peripheral pulses bilaterally; no LE edema  Skin: normal color & turgor; no rash  Neurologic: aox3; no focal deficits    LABS:                        8.4    9.43  )-----------( 193      ( 07 Aug 2024 03:53 )             32.8     08-07    162<HH>  |  124<H>  |  28<H>  ----------------------------<  82  3.8   |  27  |  0.52    Ca    8.5      07 Aug 2024 03:53  Phos  2.4     08-06  Mg     2.4     08-06    TPro  5.0<L>  /  Alb  2.5<L>  /  TBili  0.2  /  DBili  x   /  AST  14  /  ALT  9<L>  /  AlkPhos  128<H>  08-06    PT/INR - ( 05 Aug 2024 21:26 )   PT: 11.7 sec;   INR: 1.03          PTT - ( 05 Aug 2024 21:26 )  PTT:30.2 sec  Urinalysis Basic - ( 07 Aug 2024 03:53 )    Color: x / Appearance: x / SG: x / pH: x  Gluc: 82 mg/dL / Ketone: x  / Bili: x / Urobili: x   Blood: x / Protein: x / Nitrite: x   Leuk Esterase: x / RBC: x / WBC x   Sq Epi: x / Non Sq Epi: x / Bacteria: x          RADIOLOGY & ADDITIONAL TESTS:    MEDICATIONS  (STANDING):  dextrose 5%. 1000 milliLiter(s) (55 mL/Hr) IV Continuous <Continuous>  heparin   Injectable 5000 Unit(s) SubCutaneous every 12 hours  iron sucrose IVPB 300 milliGRAM(s) IV Intermittent every 24 hours  levothyroxine Injectable 17 MICROGram(s) IV Push at bedtime    MEDICATIONS  (PRN):   STEPDOWN FROM [facility]  HOSPITAL COURSE:  89F PMH dementia (axo at baseline), HTN, HLD, hypothyroidism presented to the ED for PEG placement due to dysphagia, resulting in FTT(patient of Dr. Loza) admitted to telemetry for management acute metabolic encephalopathy 2/2 to hypernatremia (Na 167) due to decreased oral intake and subsequent PEG placement. Hypernatremia treated with D5W, currently on 55mL/hr, Na 157 this morning.     INTERVAL/OVERNIGHT EVENTS: Overnight patient temp 96; bearhugger placed resulting in MAP of 62. Given a bolus of 250 ccs and bearhugger removed. Recieved 300mgx 3. Phos repleted    SUBJECTIVE:  Patient seen and examined at bedside, does not appear to be in acute distress; baseline AOx0, does not respond to verbal commands.     Vital Signs Last 12 Hrs  T(F): 97.5 (08-07-24 @ 07:30), Max: 98.4 (08-07-24 @ 01:00)  HR: 82 (08-07-24 @ 04:00) (70 - 84)  BP: 101/50 (08-07-24 @ 04:00) (80/52 - 101/50)  BP(mean): 71 (08-07-24 @ 04:00) (62 - 71)  RR: 18 (08-07-24 @ 04:00) (18 - 18)  SpO2: 98% (08-07-24 @ 04:00) (98% - 100%)  I&O's Summary    06 Aug 2024 07:01  -  07 Aug 2024 07:00  --------------------------------------------------------  IN: 1495 mL / OUT: 500 mL / NET: 995 mL        PHYSICAL EXAM:  General: NAD  HEENT: PERRL, EOM intact, sclera anicteric, MMM  Cardiovascular: RRR; no MRG; no JVD  Respiratory: CTAB; no WRR  GI/: soft; NTND; BS x4  Extremities: WWP; 2+ peripheral pulses bilaterally; no LE edema  Skin: normal color & turgor; no rash  Neurologic: aox0; does not respond to commands    LABS:                        8.4    9.43  )-----------( 193      ( 07 Aug 2024 03:53 )             32.8     08-07    162<HH>  |  124<H>  |  28<H>  ----------------------------<  82  3.8   |  27  |  0.52    Ca    8.5      07 Aug 2024 03:53  Phos  2.4     08-06  Mg     2.4     08-06    TPro  5.0<L>  /  Alb  2.5<L>  /  TBili  0.2  /  DBili  x   /  AST  14  /  ALT  9<L>  /  AlkPhos  128<H>  08-06    PT/INR - ( 05 Aug 2024 21:26 )   PT: 11.7 sec;   INR: 1.03          PTT - ( 05 Aug 2024 21:26 )  PTT:30.2 sec  Urinalysis Basic - ( 07 Aug 2024 03:53 )    Color: x / Appearance: x / SG: x / pH: x  Gluc: 82 mg/dL / Ketone: x  / Bili: x / Urobili: x   Blood: x / Protein: x / Nitrite: x   Leuk Esterase: x / RBC: x / WBC x   Sq Epi: x / Non Sq Epi: x / Bacteria: x          RADIOLOGY & ADDITIONAL TESTS:    MEDICATIONS  (STANDING):  dextrose 5%. 1000 milliLiter(s) (55 mL/Hr) IV Continuous <Continuous>  heparin   Injectable 5000 Unit(s) SubCutaneous every 12 hours  iron sucrose IVPB 300 milliGRAM(s) IV Intermittent every 24 hours  levothyroxine Injectable 17 MICROGram(s) IV Push at bedtime    MEDICATIONS  (PRN):

## 2024-08-08 LAB
ALBUMIN SERPL ELPH-MCNC: 2.3 G/DL — LOW (ref 3.3–5)
ALP SERPL-CCNC: 157 U/L — HIGH (ref 40–120)
ALT FLD-CCNC: 12 U/L — SIGNIFICANT CHANGE UP (ref 10–45)
ANION GAP SERPL CALC-SCNC: 11 MMOL/L — SIGNIFICANT CHANGE UP (ref 5–17)
ANION GAP SERPL CALC-SCNC: 6 MMOL/L — SIGNIFICANT CHANGE UP (ref 5–17)
ANISOCYTOSIS BLD QL: SIGNIFICANT CHANGE UP
APTT BLD: 39.1 SEC — HIGH (ref 24.5–35.6)
AST SERPL-CCNC: 25 U/L — SIGNIFICANT CHANGE UP (ref 10–40)
BASOPHILS # BLD AUTO: 0.06 K/UL — SIGNIFICANT CHANGE UP (ref 0–0.2)
BASOPHILS NFR BLD AUTO: 0.9 % — SIGNIFICANT CHANGE UP (ref 0–2)
BILIRUB SERPL-MCNC: 0.3 MG/DL — SIGNIFICANT CHANGE UP (ref 0.2–1.2)
BLD GP AB SCN SERPL QL: NEGATIVE — SIGNIFICANT CHANGE UP
BUN SERPL-MCNC: 14 MG/DL — SIGNIFICANT CHANGE UP (ref 7–23)
BUN SERPL-MCNC: 16 MG/DL — SIGNIFICANT CHANGE UP (ref 7–23)
BURR CELLS BLD QL SMEAR: PRESENT — SIGNIFICANT CHANGE UP
CALCIUM SERPL-MCNC: 7.8 MG/DL — LOW (ref 8.4–10.5)
CALCIUM SERPL-MCNC: 7.9 MG/DL — LOW (ref 8.4–10.5)
CHLORIDE SERPL-SCNC: 116 MMOL/L — HIGH (ref 96–108)
CHLORIDE SERPL-SCNC: 117 MMOL/L — HIGH (ref 96–108)
CO2 SERPL-SCNC: 21 MMOL/L — LOW (ref 22–31)
CO2 SERPL-SCNC: 26 MMOL/L — SIGNIFICANT CHANGE UP (ref 22–31)
CREAT SERPL-MCNC: 0.46 MG/DL — LOW (ref 0.5–1.3)
CREAT SERPL-MCNC: 0.46 MG/DL — LOW (ref 0.5–1.3)
DACRYOCYTES BLD QL SMEAR: SLIGHT — SIGNIFICANT CHANGE UP
EGFR: 91 ML/MIN/1.73M2 — SIGNIFICANT CHANGE UP
EGFR: 91 ML/MIN/1.73M2 — SIGNIFICANT CHANGE UP
EOSINOPHIL # BLD AUTO: 0 K/UL — SIGNIFICANT CHANGE UP (ref 0–0.5)
EOSINOPHIL NFR BLD AUTO: 0 % — SIGNIFICANT CHANGE UP (ref 0–6)
GIANT PLATELETS BLD QL SMEAR: PRESENT — SIGNIFICANT CHANGE UP
GLUCOSE BLDC GLUCOMTR-MCNC: 105 MG/DL — HIGH (ref 70–99)
GLUCOSE BLDC GLUCOMTR-MCNC: 74 MG/DL — SIGNIFICANT CHANGE UP (ref 70–99)
GLUCOSE BLDC GLUCOMTR-MCNC: 80 MG/DL — SIGNIFICANT CHANGE UP (ref 70–99)
GLUCOSE BLDC GLUCOMTR-MCNC: 82 MG/DL — SIGNIFICANT CHANGE UP (ref 70–99)
GLUCOSE SERPL-MCNC: 71 MG/DL — SIGNIFICANT CHANGE UP (ref 70–99)
GLUCOSE SERPL-MCNC: 98 MG/DL — SIGNIFICANT CHANGE UP (ref 70–99)
HCT VFR BLD CALC: 29.3 % — LOW (ref 34.5–45)
HGB BLD-MCNC: 7.8 G/DL — LOW (ref 11.5–15.5)
INR BLD: 1.06 — SIGNIFICANT CHANGE UP (ref 0.85–1.18)
LYMPHOCYTES # BLD AUTO: 0.79 K/UL — LOW (ref 1–3.3)
LYMPHOCYTES # BLD AUTO: 11.5 % — LOW (ref 13–44)
MAGNESIUM SERPL-MCNC: 2.2 MG/DL — SIGNIFICANT CHANGE UP (ref 1.6–2.6)
MANUAL SMEAR VERIFICATION: SIGNIFICANT CHANGE UP
MCHC RBC-ENTMCNC: 19.6 PG — LOW (ref 27–34)
MCHC RBC-ENTMCNC: 26.6 GM/DL — LOW (ref 32–36)
MCV RBC AUTO: 73.8 FL — LOW (ref 80–100)
METAMYELOCYTES # FLD: 0.9 % — HIGH (ref 0–0)
MICROCYTES BLD QL: SIGNIFICANT CHANGE UP
MONOCYTES # BLD AUTO: 0 K/UL — SIGNIFICANT CHANGE UP (ref 0–0.9)
MONOCYTES NFR BLD AUTO: 0 % — LOW (ref 2–14)
NEUTROPHILS # BLD AUTO: 5.97 K/UL — SIGNIFICANT CHANGE UP (ref 1.8–7.4)
NEUTROPHILS NFR BLD AUTO: 86.7 % — HIGH (ref 43–77)
NRBC # BLD: 3 /100 WBCS — HIGH (ref 0–0)
NRBC # BLD: SIGNIFICANT CHANGE UP /100 WBCS (ref 0–0)
OVALOCYTES BLD QL SMEAR: SLIGHT — SIGNIFICANT CHANGE UP
PHOSPHATE SERPL-MCNC: 2.4 MG/DL — LOW (ref 2.5–4.5)
PLAT MORPH BLD: ABNORMAL
PLATELET # BLD AUTO: 174 K/UL — SIGNIFICANT CHANGE UP (ref 150–400)
POIKILOCYTOSIS BLD QL AUTO: SLIGHT — SIGNIFICANT CHANGE UP
POLYCHROMASIA BLD QL SMEAR: SLIGHT — SIGNIFICANT CHANGE UP
POTASSIUM SERPL-MCNC: 3.2 MMOL/L — LOW (ref 3.5–5.3)
POTASSIUM SERPL-MCNC: 3.5 MMOL/L — SIGNIFICANT CHANGE UP (ref 3.5–5.3)
POTASSIUM SERPL-SCNC: 3.2 MMOL/L — LOW (ref 3.5–5.3)
POTASSIUM SERPL-SCNC: 3.5 MMOL/L — SIGNIFICANT CHANGE UP (ref 3.5–5.3)
PROT SERPL-MCNC: 5.3 G/DL — LOW (ref 6–8.3)
PROTHROM AB SERPL-ACNC: 12.1 SEC — SIGNIFICANT CHANGE UP (ref 9.5–13)
RBC # BLD: 3.97 M/UL — SIGNIFICANT CHANGE UP (ref 3.8–5.2)
RBC # FLD: 24 % — HIGH (ref 10.3–14.5)
RBC BLD AUTO: ABNORMAL
RH IG SCN BLD-IMP: POSITIVE — SIGNIFICANT CHANGE UP
SCHISTOCYTES BLD QL AUTO: SLIGHT — SIGNIFICANT CHANGE UP
SODIUM SERPL-SCNC: 148 MMOL/L — HIGH (ref 135–145)
SODIUM SERPL-SCNC: 149 MMOL/L — HIGH (ref 135–145)
WBC # BLD: 6.89 K/UL — SIGNIFICANT CHANGE UP (ref 3.8–10.5)
WBC # FLD AUTO: 6.89 K/UL — SIGNIFICANT CHANGE UP (ref 3.8–10.5)

## 2024-08-08 RX ORDER — DEXTROSE 4 G
25 TABLET,CHEWABLE ORAL ONCE
Refills: 0 | Status: COMPLETED | OUTPATIENT
Start: 2024-08-08 | End: 2024-08-08

## 2024-08-08 RX ORDER — DEXTROSE MONOHYDRATE, SODIUM CHLORIDE, SODIUM LACTATE, CALCIUM CHLORIDE, MAGNESIUM CHLORIDE 1.5; 538; 448; 18.4; 5.08 G/100ML; MG/100ML; MG/100ML; MG/100ML; MG/100ML
2100 SOLUTION INTRAPERITONEAL
Refills: 0 | Status: DISCONTINUED | OUTPATIENT
Start: 2024-08-08 | End: 2024-08-09

## 2024-08-08 RX ORDER — GINGER ROOT/GINGER ROOT EXT 262.5 MG
100 CAPSULE ORAL DAILY
Refills: 0 | Status: DISCONTINUED | OUTPATIENT
Start: 2024-08-08 | End: 2024-08-09

## 2024-08-08 RX ORDER — POTASSIUM PHOSPHATE, MONOBASIC AND POTASSIUM PHOSPHATE, DIBASIC 224; 236 MG/ML; MG/ML
30 INJECTION, SOLUTION INTRAVENOUS ONCE
Refills: 0 | Status: COMPLETED | OUTPATIENT
Start: 2024-08-08 | End: 2024-08-08

## 2024-08-08 RX ADMIN — HEPARIN SODIUM 5000 UNIT(S): 1000 INJECTION, SOLUTION INTRAVENOUS; SUBCUTANEOUS at 05:39

## 2024-08-08 RX ADMIN — Medication 17 MICROGRAM(S): at 23:17

## 2024-08-08 RX ADMIN — POTASSIUM PHOSPHATE, MONOBASIC AND POTASSIUM PHOSPHATE, DIBASIC 83.33 MILLIMOLE(S): 224; 236 INJECTION, SOLUTION INTRAVENOUS at 14:12

## 2024-08-08 RX ADMIN — DEXTROSE MONOHYDRATE, SODIUM CHLORIDE, SODIUM LACTATE, CALCIUM CHLORIDE, MAGNESIUM CHLORIDE 80 MILLILITER(S): 1.5; 538; 448; 18.4; 5.08 SOLUTION INTRAPERITONEAL at 11:34

## 2024-08-08 RX ADMIN — IRON SUCROSE 176.67 MILLIGRAM(S): 20 INJECTION, SOLUTION INTRAVENOUS at 23:18

## 2024-08-08 RX ADMIN — Medication 100 MILLIGRAM(S): at 12:25

## 2024-08-08 RX ADMIN — Medication 25 MILLILITER(S): at 16:17

## 2024-08-08 RX ADMIN — DEXTROSE MONOHYDRATE, SODIUM CHLORIDE, SODIUM LACTATE, CALCIUM CHLORIDE, MAGNESIUM CHLORIDE 80 MILLILITER(S): 1.5; 538; 448; 18.4; 5.08 SOLUTION INTRAPERITONEAL at 23:17

## 2024-08-08 RX ADMIN — HEPARIN SODIUM 5000 UNIT(S): 1000 INJECTION, SOLUTION INTRAVENOUS; SUBCUTANEOUS at 18:10

## 2024-08-08 NOTE — PROGRESS NOTE ADULT - PROBLEM SELECTOR PLAN 2
#Dysphagia   Has ~1w of decreased PO intake. Also seems to aspirate/cough every time she tries to eat.  Family was in touch w/ Stefano Carter/Dr. Loza, was referred to ED for PEG    Plan:  - Pending PEG placement once hypernatremia resolved  - Reach out to GI and f/u PEG placement timing  - NPO until procedure  - glucose checks q4, if glucose <70 can give amp #Dysphagia   Has ~1w of decreased PO intake. Also seems to aspirate/cough every time she tries to eat.  Family was in touch w/ Stefano Carter/Dr. Loza, was referred to ED for PEG    Plan:  - PEG placement tomorrow once hypernatremia resolved  - NPO until procedure  - glucose checks q4, if glucose <70 can give amp #Dysphagia   Has ~1w of decreased PO intake. Also seems to aspirate/cough every time she tries to eat.  Family was in touch w/ Stefano Carter/Dr. Loza, was referred to ED for PEG    Plan:  - PEG placement tomorrow by IR once hypernatremia resolved  - NPO until procedure  - Nutrition consulted, pending recs for nutrition via PEG tube  - glucose checks q4, if glucose <70 can give amp

## 2024-08-08 NOTE — PROGRESS NOTE ADULT - PROBLEM SELECTOR PLAN 6
Home med: Atorvastatin 10mg     Plan:  - Confirm home med Home med: Atorvastatin 10mg     Plan:  - holding home PO meds

## 2024-08-08 NOTE — PROGRESS NOTE ADULT - PROBLEM SELECTOR PLAN 3
Likely 2/2 decrease PO intake   On presentation pt noted with Na 167, reduced today to 164 with D5W    Plan:  - free water deficit 0.9L  - c/w D5W 55 mL/hr  - Correct sodium slowly -> decrease by 8 in 24 hours  - BMP Q4h  - Monitor UO  - Q6h BS  - Strict I&O's Likely 2/2 decrease PO intake   On presentation pt noted with Na 167, reduced to 164 with D5W  8/8: Free water deficit 2.1L accounting for insensible losses     Plan:  - Given FWD 2.1L, will restart D5W @80cc/hr  - Correct sodium slowly -> decrease by 8 in 24 hours -- goal Na ~140-141 by tomorrow AM  - BMP q4  - Monitor UO  - BS q6  - Strict I&O's

## 2024-08-08 NOTE — PROGRESS NOTE ADULT - SUBJECTIVE AND OBJECTIVE BOX
OVERNIGHT EVENTS:    SUBJECTIVE / INTERVAL HPI: Patient seen and examined at bedside.       PHYSICAL EXAM:    General: Lying comfortably and in no acute distress  HEENT: NC/AT; EOMI, anicteric sclera; MMM  Neck: supple  Cardiovascular: +S1/S2, RRR  Respiratory: CTA B/L; no W/R/R  Gastrointestinal: soft, NT/ND; +BSx4  Extremities: WWP; no edema, clubbing or cyanosis  Vascular: 2+ radial pulses B/L  Neurological: AAOx3; no focal deficits  Psychiatric: pleasant mood and affect  Dermatologic: no appreciable wounds or damage to the skin    VITAL SIGNS:  Vital Signs Last 24 Hrs  T(C): 36.4 (08 Aug 2024 06:01), Max: 36.9 (07 Aug 2024 14:00)  T(F): 97.6 (08 Aug 2024 06:01), Max: 98.5 (07 Aug 2024 14:00)  HR: 87 (08 Aug 2024 06:01) (76 - 88)  BP: 101/60 (08 Aug 2024 06:01) (92/54 - 117/55)  BP(mean): 78 (07 Aug 2024 15:10) (67 - 78)  RR: 17 (08 Aug 2024 06:01) (16 - 18)  SpO2: 96% (08 Aug 2024 06:01) (95% - 100%)    Parameters below as of 08 Aug 2024 06:01  Patient On (Oxygen Delivery Method): room air          MEDICATIONS:  MEDICATIONS  (STANDING):  heparin   Injectable 5000 Unit(s) SubCutaneous every 12 hours  iron sucrose IVPB 300 milliGRAM(s) IV Intermittent every 24 hours  levothyroxine Injectable 17 MICROGram(s) IV Push at bedtime    MEDICATIONS  (PRN):      ALLERGIES:  Allergies    No Known Allergies    Intolerances        LABS:                        7.7    6.56  )-----------( 179      ( 07 Aug 2024 11:40 )             29.3     08-08    148<H>  |  116<H>  |  16  ----------------------------<  98  3.2<L>   |  26  |  0.46<L>    Ca    7.8<L>      08 Aug 2024 02:36  Phos  3.1     08-07  Mg     2.4     08-07    TPro  5.3<L>  /  Alb  2.5<L>  /  TBili  0.3  /  DBili  x   /  AST  20  /  ALT  11  /  AlkPhos  150<H>  08-07      Urinalysis Basic - ( 08 Aug 2024 02:36 )    Color: x / Appearance: x / SG: x / pH: x  Gluc: 98 mg/dL / Ketone: x  / Bili: x / Urobili: x   Blood: x / Protein: x / Nitrite: x   Leuk Esterase: x / RBC: x / WBC x   Sq Epi: x / Non Sq Epi: x / Bacteria: x      CAPILLARY BLOOD GLUCOSE      POCT Blood Glucose.: 80 mg/dL (08 Aug 2024 02:24)      RADIOLOGY & ADDITIONAL TESTS: Reviewed. OVERNIGHT EVENTS: Overnight, repeat Na 147 @2300, D5W held. Repeat Na 148 @0200.     SUBJECTIVE / INTERVAL HPI: Patient seen and examined at bedside. Nonverbal at baseline, does not respond to verbal prompts.      PHYSICAL EXAM:    General: Lying comfortably and in no acute distress  HEENT: NC/AT; EOMI, anicteric sclera; MMM  Neck: supple  Cardiovascular: +S1/S2, RRR. Systolic murmur heart on auscultation.   Respiratory: CTA B/L; no W/R/R  Gastrointestinal: soft, NT/ND; +BSx4  Extremities: WWP; bilateral pressure boots in place. Posterior bilateral lower leg pressure wounds, dressed appropriately, c/d/i.   Vascular: 2+ radial pulses B/L  Neurological: AAOx0    VITAL SIGNS:  Vital Signs Last 24 Hrs  T(C): 36.4 (08 Aug 2024 06:01), Max: 36.9 (07 Aug 2024 14:00)  T(F): 97.6 (08 Aug 2024 06:01), Max: 98.5 (07 Aug 2024 14:00)  HR: 87 (08 Aug 2024 06:01) (76 - 88)  BP: 101/60 (08 Aug 2024 06:01) (92/54 - 117/55)  BP(mean): 78 (07 Aug 2024 15:10) (67 - 78)  RR: 17 (08 Aug 2024 06:01) (16 - 18)  SpO2: 96% (08 Aug 2024 06:01) (95% - 100%)    Parameters below as of 08 Aug 2024 06:01  Patient On (Oxygen Delivery Method): room air          MEDICATIONS:  MEDICATIONS  (STANDING):  heparin   Injectable 5000 Unit(s) SubCutaneous every 12 hours  iron sucrose IVPB 300 milliGRAM(s) IV Intermittent every 24 hours  levothyroxine Injectable 17 MICROGram(s) IV Push at bedtime    MEDICATIONS  (PRN):      ALLERGIES:  Allergies    No Known Allergies    Intolerances        LABS:                        7.7    6.56  )-----------( 179      ( 07 Aug 2024 11:40 )             29.3     08-08    148<H>  |  116<H>  |  16  ----------------------------<  98  3.2<L>   |  26  |  0.46<L>    Ca    7.8<L>      08 Aug 2024 02:36  Phos  3.1     08-07  Mg     2.4     08-07    TPro  5.3<L>  /  Alb  2.5<L>  /  TBili  0.3  /  DBili  x   /  AST  20  /  ALT  11  /  AlkPhos  150<H>  08-07      Urinalysis Basic - ( 08 Aug 2024 02:36 )    Color: x / Appearance: x / SG: x / pH: x  Gluc: 98 mg/dL / Ketone: x  / Bili: x / Urobili: x   Blood: x / Protein: x / Nitrite: x   Leuk Esterase: x / RBC: x / WBC x   Sq Epi: x / Non Sq Epi: x / Bacteria: x      CAPILLARY BLOOD GLUCOSE      POCT Blood Glucose.: 80 mg/dL (08 Aug 2024 02:24)      RADIOLOGY & ADDITIONAL TESTS: Reviewed.

## 2024-08-08 NOTE — PROGRESS NOTE ADULT - SUBJECTIVE AND OBJECTIVE BOX
Pt seen and examined   looks comfortable    REVIEW OF SYSTEMS:  dementia      MEDICATIONS:  MEDICATIONS  (STANDING):  heparin   Injectable 5000 Unit(s) SubCutaneous every 12 hours  iron sucrose IVPB 300 milliGRAM(s) IV Intermittent every 24 hours  levothyroxine Injectable 17 MICROGram(s) IV Push at bedtime  thiamine Injectable 100 milliGRAM(s) IV Push daily    MEDICATIONS  (PRN):      Allergies    No Known Allergies    Intolerances        Vital Signs Last 24 Hrs  T(C): 37.1 (08 Aug 2024 08:59), Max: 37.1 (08 Aug 2024 08:59)  T(F): 98.8 (08 Aug 2024 08:59), Max: 98.8 (08 Aug 2024 08:59)  HR: 66 (08 Aug 2024 08:59) (66 - 88)  BP: 113/72 (08 Aug 2024 08:59) (101/60 - 117/55)  BP(mean): 78 (07 Aug 2024 15:10) (78 - 78)  RR: 14 (08 Aug 2024 08:59) (14 - 18)  SpO2: 98% (08 Aug 2024 08:59) (95% - 100%)    Parameters below as of 08 Aug 2024 08:59  Patient On (Oxygen Delivery Method): room air        08-07 @ 07:01  -  08-08 @ 07:00  --------------------------------------------------------  IN: 440 mL / OUT: 450 mL / NET: -10 mL        PHYSICAL EXAM:    General:   in no acute distress  HEENT: MMM, conjunctiva and sclera clear  Lungs: clear but poor inspiratory effort  Heart: regular  Gastrointestinal: Soft non-tender non-distended; Normal bowel sounds; No    Skin: Warm and dry. No obvious rash    LABS:      CBC Full  -  ( 08 Aug 2024 05:30 )  WBC Count : 6.89 K/uL  RBC Count : 3.97 M/uL  Hemoglobin : 7.8 g/dL  Hematocrit : 29.3 %  Platelet Count - Automated : 174 K/uL  Mean Cell Volume : 73.8 fl  Mean Cell Hemoglobin : 19.6 pg  Mean Cell Hemoglobin Concentration : 26.6 gm/dL  Auto Neutrophil # : 5.97 K/uL  Auto Lymphocyte # : 0.79 K/uL  Auto Monocyte # : 0.00 K/uL  Auto Eosinophil # : 0.00 K/uL  Auto Basophil # : 0.06 K/uL  Auto Neutrophil % : 86.7 %  Auto Lymphocyte % : 11.5 %  Auto Monocyte % : 0.0 %  Auto Eosinophil % : 0.0 %  Auto Basophil % : 0.9 %    08-08    149<H>  |  117<H>  |  14  ----------------------------<  71  3.5   |  21<L>  |  0.46<L>    Ca    7.9<L>      08 Aug 2024 05:30  Phos  2.4     08-08  Mg     2.2     08-08    TPro  5.3<L>  /  Alb  2.3<L>  /  TBili  0.3  /  DBili  x   /  AST  25  /  ALT  12  /  AlkPhos  157<H>  08-08    PT/INR - ( 08 Aug 2024 05:30 )   PT: 12.1 sec;   INR: 1.06          PTT - ( 08 Aug 2024 05:30 )  PTT:39.1 sec      Urinalysis Basic - ( 08 Aug 2024 05:30 )    Color: x / Appearance: x / SG: x / pH: x  Gluc: 71 mg/dL / Ketone: x  / Bili: x / Urobili: x   Blood: x / Protein: x / Nitrite: x   Leuk Esterase: x / RBC: x / WBC x   Sq Epi: x / Non Sq Epi: x / Bacteria: x                RADIOLOGY & ADDITIONAL STUDIES (The following images were personally reviewed):

## 2024-08-09 ENCOUNTER — TRANSCRIPTION ENCOUNTER (OUTPATIENT)
Age: 89
End: 2024-08-09

## 2024-08-09 VITALS
RESPIRATION RATE: 18 BRPM | HEART RATE: 84 BPM | OXYGEN SATURATION: 100 % | TEMPERATURE: 97 F | WEIGHT: 85.1 LBS | SYSTOLIC BLOOD PRESSURE: 101 MMHG | HEIGHT: 63 IN | DIASTOLIC BLOOD PRESSURE: 61 MMHG

## 2024-08-09 LAB
GLUCOSE BLDC GLUCOMTR-MCNC: 78 MG/DL — SIGNIFICANT CHANGE UP (ref 70–99)
GLUCOSE BLDC GLUCOMTR-MCNC: 91 MG/DL — SIGNIFICANT CHANGE UP (ref 70–99)

## 2024-08-09 PROCEDURE — 82728 ASSAY OF FERRITIN: CPT

## 2024-08-09 PROCEDURE — 82140 ASSAY OF AMMONIA: CPT

## 2024-08-09 PROCEDURE — 84295 ASSAY OF SERUM SODIUM: CPT

## 2024-08-09 PROCEDURE — 83036 HEMOGLOBIN GLYCOSYLATED A1C: CPT

## 2024-08-09 PROCEDURE — 85027 COMPLETE CBC AUTOMATED: CPT

## 2024-08-09 PROCEDURE — 86901 BLOOD TYPING SEROLOGIC RH(D): CPT

## 2024-08-09 PROCEDURE — 99285 EMERGENCY DEPT VISIT HI MDM: CPT

## 2024-08-09 PROCEDURE — 86780 TREPONEMA PALLIDUM: CPT

## 2024-08-09 PROCEDURE — 84132 ASSAY OF SERUM POTASSIUM: CPT

## 2024-08-09 PROCEDURE — 84443 ASSAY THYROID STIM HORMONE: CPT

## 2024-08-09 PROCEDURE — 80048 BASIC METABOLIC PNL TOTAL CA: CPT

## 2024-08-09 PROCEDURE — 85730 THROMBOPLASTIN TIME PARTIAL: CPT

## 2024-08-09 PROCEDURE — 80053 COMPREHEN METABOLIC PANEL: CPT

## 2024-08-09 PROCEDURE — 83930 ASSAY OF BLOOD OSMOLALITY: CPT

## 2024-08-09 PROCEDURE — 85610 PROTHROMBIN TIME: CPT

## 2024-08-09 PROCEDURE — 84439 ASSAY OF FREE THYROXINE: CPT

## 2024-08-09 PROCEDURE — 84100 ASSAY OF PHOSPHORUS: CPT

## 2024-08-09 PROCEDURE — 96360 HYDRATION IV INFUSION INIT: CPT

## 2024-08-09 PROCEDURE — 86850 RBC ANTIBODY SCREEN: CPT

## 2024-08-09 PROCEDURE — 83540 ASSAY OF IRON: CPT

## 2024-08-09 PROCEDURE — L8699: CPT

## 2024-08-09 PROCEDURE — 83735 ASSAY OF MAGNESIUM: CPT

## 2024-08-09 PROCEDURE — 71045 X-RAY EXAM CHEST 1 VIEW: CPT

## 2024-08-09 PROCEDURE — 86900 BLOOD TYPING SEROLOGIC ABO: CPT

## 2024-08-09 PROCEDURE — 82330 ASSAY OF CALCIUM: CPT

## 2024-08-09 PROCEDURE — 83550 IRON BINDING TEST: CPT

## 2024-08-09 PROCEDURE — 85025 COMPLETE CBC W/AUTO DIFF WBC: CPT

## 2024-08-09 PROCEDURE — 82803 BLOOD GASES ANY COMBINATION: CPT

## 2024-08-09 PROCEDURE — 82962 GLUCOSE BLOOD TEST: CPT

## 2024-08-09 PROCEDURE — 36415 COLL VENOUS BLD VENIPUNCTURE: CPT

## 2024-08-09 PROCEDURE — 93005 ELECTROCARDIOGRAM TRACING: CPT

## 2024-08-09 DEVICE — KIT ENDO SAFTEY PEG PULL STD 20 FR ENDOVIVE: Type: IMPLANTABLE DEVICE | Status: FUNCTIONAL

## 2024-08-09 RX ADMIN — HEPARIN SODIUM 5000 UNIT(S): 1000 INJECTION, SOLUTION INTRAVENOUS; SUBCUTANEOUS at 06:37

## 2024-08-09 RX ADMIN — Medication 100 MILLIGRAM(S): at 13:16

## 2024-08-09 NOTE — PROGRESS NOTE ADULT - PROBLEM/PLAN-9
VIDEO VISIT PROGRESS NOTE      CHIEF COMPLAINT  No chief complaint on file. SUBJECTIVE  The patient is a 62year old female who is requesting a Video Visit (V-Visit) for evaluation of urinary issues  She has had foul smelling urine with increased frequency and pain with urination and urgency for the past 2 days, it got worse today  She has been pushing fluids and cranberry tabs without help. She denies any nausea, vomiting,. Fever or chills  She has had increased sweating, she denies any blood in urine or back pain  She reports lower abd tenderness over the bladder area  She is taking immunosuppressant meds    MEDICATIONS  The medication list in the medical record as well as updates to the medication list submitted by the patient with this V-Visit were reviewed and updated today.       HISTORIES  I have personally reviewed and updated the following electronic medical record sections: Current medications, Allergies, Problem list, Past Medical History, Past Surgical History, Social History and Family History    REVIEW OF SYSTEMS  As above    PHYSICAL EXAM  She is alert, nontoxic with fluent speech      ASSESSMENT/PLAN  UTI; increaese fluids, macrobid BID for 7 days   FOLLOW UP  Fu with PMD for urine culture if not better in 2-3 days
DISPLAY PLAN FREE TEXT

## 2024-08-09 NOTE — DISCHARGE NOTE PROVIDER - NSDCCPCAREPLAN_GEN_ALL_CORE_FT
PRINCIPAL DISCHARGE DIAGNOSIS  Diagnosis: Adult failure to thrive  Assessment and Plan of Treatment: NUTRITION RECOMMENDATIONS:   1. Enteral Nutrition Recommendations:  - Initiate enteral nutrition support 24 hours after PEG placement  - Recommend Jevity 1.2 with goal rate of 47 ml/hr with LPS x1/day from 2080-4249 to provide 846 ml tube feed, 1115 calories, 62 g Prot., and 683 ml free water. This is 22.5 nonprotein calories and 1.61 g Prot. per kg current body weight 38.6 kg.   - Recommend initiate and advance tube feed slowly, start at 10 mL/hr, advance by 10 mL every 10 hours, as pt is high risk for refeeding syndrome   - Monitor tolerance to TF and adjust, as necessary  ***Once pt demonstrates well tolerance to TF at continuous goal rate, pt able to trial bolus feeds:  - Recommend Jevity 1.2 via  mL per bolus three times/day, 140 mL bolus 1 time/day, with LPS 1x/day. This provides 860 total mL, 1,132 kcal, 62g protein 694 mL free water. Free water flushes per team.   - Sample bolus schedule: 240 mL @ 8am, 240 mL @ 12pm, 240 mL @ 4pm, 140 mL at 8pm  2. Continue to monitor lytes, specifically Mg, Phos and K+ for refeeding, replete prn  3. Appreciate weekly weight trends  4. Continue to monitor BMs to ensure consistent BMs every 1-2 days      SECONDARY DISCHARGE DIAGNOSES  Diagnosis: Hypernatremia  Assessment and Plan of Treatment:

## 2024-08-09 NOTE — PROGRESS NOTE ADULT - PROBLEM SELECTOR PLAN 9
F: D5W @ 55ml/hr  E: Replete PRN  N: NPO-pending PEG placement   DVT: heparin subq   DISPO: [facility]
F: D5W @ 55ml/hr  E: Replete PRN  N: NPO-pending PEG placement   DVT: heparin subq   DISPO: [facility]
F: D5W @ 55ml/hr  E: Replete PRN  N: NPO-pending PEG placement   DVT: heparin subq   DISPO: [facility]

## 2024-08-09 NOTE — PROGRESS NOTE ADULT - SUBJECTIVE AND OBJECTIVE BOX
OVERNIGHT EVENTS:    SUBJECTIVE / INTERVAL HPI: Patient seen and examined at bedside.       PHYSICAL EXAM:    General: Lying comfortably and in no acute distress  HEENT: NC/AT; EOMI, anicteric sclera; MMM  Neck: supple  Cardiovascular: +S1/S2, RRR  Respiratory: CTA B/L; no W/R/R  Gastrointestinal: soft, NT/ND; +BSx4  Extremities: WWP; no edema, clubbing or cyanosis  Vascular: 2+ radial pulses B/L  Neurological: AAOx3; no focal deficits  Psychiatric: pleasant mood and affect  Dermatologic: no appreciable wounds or damage to the skin    VITAL SIGNS:  Vital Signs Last 24 Hrs  T(C): 36.4 (09 Aug 2024 02:32), Max: 37.1 (08 Aug 2024 08:59)  T(F): 97.5 (09 Aug 2024 02:32), Max: 98.8 (08 Aug 2024 08:59)  HR: 82 (09 Aug 2024 02:32) (66 - 89)  BP: 98/57 (09 Aug 2024 02:32) (96/64 - 113/72)  BP(mean): --  RR: 18 (09 Aug 2024 02:32) (14 - 18)  SpO2: 100% (09 Aug 2024 02:32) (95% - 100%)    Parameters below as of 09 Aug 2024 02:32  Patient On (Oxygen Delivery Method): room air          MEDICATIONS:  MEDICATIONS  (STANDING):  dextrose 5%. 2100 milliLiter(s) (80 mL/Hr) IV Continuous <Continuous>  heparin   Injectable 5000 Unit(s) SubCutaneous every 12 hours  levothyroxine Injectable 17 MICROGram(s) IV Push at bedtime  thiamine Injectable 100 milliGRAM(s) IV Push daily    MEDICATIONS  (PRN):      ALLERGIES:  Allergies    No Known Allergies    Intolerances        LABS:                        7.8    6.89  )-----------( 174      ( 08 Aug 2024 05:30 )             29.3     08-08    149<H>  |  117<H>  |  14  ----------------------------<  71  3.5   |  21<L>  |  0.46<L>    Ca    7.9<L>      08 Aug 2024 05:30  Phos  2.4     08-08  Mg     2.2     08-08    TPro  5.3<L>  /  Alb  2.3<L>  /  TBili  0.3  /  DBili  x   /  AST  25  /  ALT  12  /  AlkPhos  157<H>  08-08    PT/INR - ( 08 Aug 2024 05:30 )   PT: 12.1 sec;   INR: 1.06          PTT - ( 08 Aug 2024 05:30 )  PTT:39.1 sec  Urinalysis Basic - ( 08 Aug 2024 05:30 )    Color: x / Appearance: x / SG: x / pH: x  Gluc: 71 mg/dL / Ketone: x  / Bili: x / Urobili: x   Blood: x / Protein: x / Nitrite: x   Leuk Esterase: x / RBC: x / WBC x   Sq Epi: x / Non Sq Epi: x / Bacteria: x      CAPILLARY BLOOD GLUCOSE      POCT Blood Glucose.: 91 mg/dL (09 Aug 2024 06:10)      RADIOLOGY & ADDITIONAL TESTS: Reviewed.

## 2024-08-09 NOTE — PROGRESS NOTE ADULT - PROBLEM SELECTOR PLAN 3
Likely 2/2 decrease PO intake   On presentation pt noted with Na 167, reduced to 164 with D5W  8/8: Free water deficit 2.1L accounting for insensible losses     Plan:  - Given FWD 2.1L, will restart D5W @80cc/hr  - Correct sodium slowly -> decrease by 8 in 24 hours -- goal Na ~140-141 by tomorrow AM  - BMP q4  - Monitor UO  - BS q6  - Strict I&O's

## 2024-08-09 NOTE — PROGRESS NOTE ADULT - PROBLEM SELECTOR PLAN 7
Home med: levothyroxine 25mcg  - TSH elevated at 4.7, free T4 wnl    Plan;  - c/w IV levothyroxine 17mcg

## 2024-08-09 NOTE — PROGRESS NOTE ADULT - PROBLEM SELECTOR PLAN 5
Currently normotensive    Plan:  - Pending med rec

## 2024-08-09 NOTE — PRE-ANESTHESIA EVALUATION ADULT - RESPIRATORY RATE (BREATHS/MIN)
Formerly [facility], 1928–1983  Hepatobiliary and Pancreatic Surgery      DATE OF PROCEDURE: 7/5/2023    SURGEON: Jessica Stewart MD, FACS    ASSISTANT: Barbi Nagel MD ([id number])    PREOPERATIVE DIAGNOSIS: symptomatic cholelithiasis with hepatic fibrosis    POSTOPERATIVE DIAGNOSIS: same    OPERATION: Laparoscopic Robotic Assisted Cholecystectomy with core liver biopsy x 5    ANESTHESIA: General endotracheal.    ESTIMATED BLOOD LOSS: Less than 10 mL. COMPLICATIONS: None. FLUIDS: Crystalloid. SPECIMEN: Gallbladder. DESCRIPTION OF PROCEDURE: This is a 52 y. o.female with increasingly symptomatic right upper quadrant epigastric pain. She had an ultrasound showing cholelithiasis of the gallbladder. After being explained the risks, benefits, and alternatives of the procedure, She agreed to proceed. She was taken to the operating room and placed supine on the operating room table, administered general anesthesia and intubated. Once the airway was secured and the patient was adequately sedated a time-out was performed to confirm the surgical site and the patient's name. We initially made a supraumbilial incision and inserted the Veress needle. Confirmed to be in place we insufflated to 15mmHg mercury. The camera was inserted and an 8mm trocar was inserted at the umbilicus, a 0.2KG trocar to the right of the umbilicus a 4th 5mm trocar in the right lateral abdomen. An 8-mm incision in the left side of the abomen and a robotic port was inserted. We then inserted a camera throught the umbilical port and docked the robot. We inspected the abdomen appreciating some inflammation around the right upper quadrant near the gallbladder. The omentum was adhered to the gallbladder and electrocautery was used to dissect the omentum off the gallbladder.   A prograsp graspers were used grasp the gallbladder and retract cephalad identifying the infundibulum and we dissected the peritoneal off the infundibulum identifying the
18

## 2024-08-09 NOTE — PROGRESS NOTE ADULT - SUBJECTIVE AND OBJECTIVE BOX
discharge plan for this PM per family request  Community Physician Dr Maya will visit at home this PM and take over her care needs and medical needs  peg care and feeds will be managed by Dr Maya's team

## 2024-08-09 NOTE — CHART NOTE - NSCHARTNOTEFT_GEN_A_CORE
ADMITTING DIAGNOSIS:   Patient is a 89y old  Female who presents with a chief complaint of Failure To Thrive (09 Aug 2024 10:49)    PAST MEDICAL & SURGICAL HISTORY:  HTN (hypertension)  Hypothyroid  HLD (hyperlipidemia)  Dementia  No significant past surgical history    CURRENT DIET ORDER: Diet, NPO (08-06-24 @ 04:35) [Active]     PO INTAKE: NPO    SKIN: unstageable L hip pressure injury per RN flowsheets    08-08-24 @ 07:01  -  08-09-24 @ 07:00  --------------------------------------------------------  IN: 480 mL / OUT: 0 mL / NET: 480 mL    EDEMA: No edema per RN flowsheets    LABS:   08-08    149<H>  |  117<H>  |  14  ----------------------------<  71  3.5   |  21<L>  |  0.46<L>    Ca    7.9<L>      08 Aug 2024 05:30  Phos  2.4     08-08  Mg     2.2     08-08    TPro  5.3<L>  /  Alb  2.3<L>  /  TBili  0.3  /  DBili  x   /  AST  25  /  ALT  12  /  AlkPhos  157<H>  08-08    CAPILLARY BLOOD GLUCOSE  POCT Blood Glucose.: 91 mg/dL (09 Aug 2024 06:10)  POCT Blood Glucose.: 78 mg/dL (09 Aug 2024 02:26)  POCT Blood Glucose.: 105 mg/dL (08 Aug 2024 17:48)    MEDICATIONS:  MEDICATIONS  (STANDING):  dextrose 5%. 2100 milliLiter(s) (80 mL/Hr) IV Continuous <Continuous>  heparin   Injectable 5000 Unit(s) SubCutaneous every 12 hours  levothyroxine Injectable 17 MICROGram(s) IV Push at bedtime  thiamine Injectable 100 milliGRAM(s) IV Push daily    WEIGHT: (8/9)  Height for BMI (CENTIMETERS)	160 Centimeter(s)  Weight for BMI (lbs)	85.1 lb  Weight for BMI (kg)	38.6 kg  Body Mass Index	15    NUTRITION FOCUSED PHYSICAL EXAM: Completed [8/6]; Not Pertinent at this time  Nutriton Focused Physical Exam	yes...  Muscle Mass (Loss of Muscle)	Temples...  Clavicles...  Temples Depletion is	severe  Clavicles Depletion is	severe  Body Fat (loss of subcutaneous fat)	Orbital...  Buccal...  Orbital Depletion is	severe  Buccal Depletion is	severe    ESTIMATED ENERGY NEEDS:   Weight used for calculations	current weight  Estimated Energy Needs Weight (lbs)	85 lb  Estimated Energy Needs Weight (kg)	38.6 kg  Estimated Energy Needs From (coty/kg)	30  Estimated Energy Needs To (coty/kg)	35  Estimated Energy Needs Calculated From (coty/kg)	1158  Estimated Energy Needs Calculated To (coty/kg)	1351    Weight used for calculations	current weight  Estimated Protein Needs Weight (lbs)	85 lb  Estimated Protein Needs Weight (kg)	38.6 kg  Estimated Protein Needs From (g/kg)	1.5  Estimated Protein Needs To (g/kg)	2  Estimated Protein Needs Calculated From (g/kg)	57.9  Estimated Protein Needs Calculated To (g/kg)	77.2    Estimated Fluid Needs Weight (lbs)	85 lb  Estimated Fluid Needs Weight (kg)	38.6 kg  Estimated Fluid Needs From (ml/kg)	30  Estimated Fluid Needs To (ml/kg)	35  Estimated Fluid Needs Calculated From (ml/kg)	1158  Estimated Fluid Needs Calculated To (ml/kg)	1351    *Needs determined using current body weight 38.6 kg (74%IBW) adjusted for protein-calorie malnutrition.    SUBJECTIVE:   Per H&P: 89F PMH dementia, HTN, HLD, hypothyroidism presented to the ED for Failure To Thrive and PEG placement (pt of Dr. Loza) admitted to [place] for Hypernatremia of 167 and PEG placement given Failure To Thrive.    (8/6) Pt assessed at bedside on 7LA. Rx and labs reviewed. Pt presents with severe wasting of the orbital, temporal, buccal, and clavicular wasting; most of body covered with bedding. Spoke primarily with aide at bedside who reports usual body weight of 80's; current body weight of 85 pounds. Notes limited tolerance to food and inadequate PO intake; plan for PEG placement in setting of FTT. Pt meets ASPEN criteria for severe, chronic protein-calorie malnutrition in setting of severe muscle/fat wasting. No reported complaints of nausea/vomiting/constipation/diarrhea. NKA to food.     (8/9) Nutrition consulted for TF recommendations. Pt discussed in interdisciplinary rounds. Pt with PEG placement today. Last BM 8/8 per RN flowsheets. See below for RD recommendations.    PREVIOUS NUTRITION DIAGNOSIS: Malnutrition (severe, chronic) related to unable to meet at least 75% estimated energy requirements as evidenced by severe muscle/fat wasting.    Active [X]  Resolved [   ]    IF RESOLVED, NEW PES: N/A    GOAL: Pt will meet 75% or more of protein/energy needs via most appropriate route for nutrition and reduce/resolve s/sx protein-calorie malnutrition.    MONITORING AND EVALUATION:   RD will continue to monitor:  - Food and nutrient intake/POs  - Nutrition related lab value, specifically lytes  - Weight/weight trends  - GI functions    NUTRITION RECOMMENDATIONS:   1. Enteral Nutrition Recommendations:  - Initiate enteral nutrition support 24 hours after PEG placement  - Recommend Jevity 1.2 with goal rate of 47 ml/hr with LPS x1/day from 1556-4969 to provide 846 ml tube feed, 1115 calories, 62 gProt., and 683 ml free water. This is 22.5 nonprotein calories and 1.61 gProt. per kg current body weight 38.6 kg.   - Recommend initiate and advance tube feed slowly, start at 10 mL/hr, advance by 10 mL every 10 hours, as pt is high risk for refeeding syndrome   - Monitor tolerance to TF and adjust, as necessary  ***Once pt demonstrates well tolerance to TF at continuous goal rate, pt able to trial bolus feeds:  - Recommend Jevity 1.2 via  mL per bolus three times/day, 140 mL bolus 1 time/day, with LPS 1x/day. This provides 860 total mL, 1,132 kcal, 62g protein 694 mL free water. Free water flushes per team.   - Sample bolus schedule: 240 mL @ 8am, 240 mL @ 12pm, 240 mL @ 4pm, 140 mL at 8pm  2. Continue to monitor lytes, specifically Mg, Phos and K+ for refeeding, replete prn  3. Appreciate weekly weight trends  4. Continue to monitor BMs to ensure consistent BMs every 1-2 days    RISK LEVEL: High [X] Moderate [   ] Low [   ]    Peggy Mckeon RDN also available via TEAMS

## 2024-08-09 NOTE — DISCHARGE NOTE PROVIDER - INSTRUCTIONS
Tube feeds per nutrition - Jevity 1.2 with goal rate of 47 ml/hr with LPS x1/day from 7102-7678 to provide 846 ml tube feed, 1115 calories, 62 gProt., and 683 ml free water. This is 22.5 nonprotein calories and 1.61 gProt. per kg current body weight 38.6 kg.

## 2024-08-09 NOTE — DISCHARGE NOTE PROVIDER - HOSPITAL COURSE
#Discharge: do not delete    89F PMH dementia (axo at baseline), HTN, HLD, hypothyroidism presented to the ED for PEG placement due to dysphagia, resulting in FTT(patient of Dr. Loza) admitted to telemetry for management acute metabolic encephalopathy 2/2 to hypernatremia (Na 167) due to decreased oral intake and subsequent PEG placement.    Problem List/Main Diagnoses (system-based):   #Acute metabolic encephalopathy.   - Baseline AOx0-1  - hyperosmolar coma possible given elevated serum osmo. patient also severely hypernatremic. AMS likely multifactorial iso of poor PO intake, advanced dementia, hyperosmolarity with significant hypernatremia. Free T4 wnl. ammonia <10.     #Adult failure to thrive, Dysphagia, s/p PEG tube (8/9)  Has ~1w of decreased PO intake. Also seems to aspirate/cough every time she tries to eat.  Family was in touch w/ Stefano Carter/Dr. Loza, was referred to ED for PEG    Plan:  - Tube feeds per nutrition - Jevity 1.2 with goal rate of 47 ml/hr with LPS x1/day from 1814-8192 to provide 846 ml tube feed, 1115 calories, 62 gProt., and 683 ml free water. This is 22.5 nonprotein calories and 1.61 gProt. per kg current body weight 38.6 kg.     #Hypernatremia.   Likely 2/2 decrease PO intake   On presentation pt noted with Na 167, reduced to 164 with D5W  8/8: Free water deficit 2.1L accounting for insensible losses, s/p D5W   Resolved     #Dementia.   Home meds - Donepezil 10mg , Memantine 20mg, Trazadone 50mg, Quetiapine 25mg     Plan:  - hold home meds, NPO    #HTN (hypertension)  Plan:  - hold home meds, NPO    #HLD (hyperlipidemia).   ·  Plan: Home med: Atorvastatin 10mg     Plan:  - hold home meds, NPO    #Hypothyroid.   ·  Plan: Home med: levothyroxine 25mcg  - TSH elevated at 4.7, free T4 wnl    Plan;  - c/w IV levothyroxine 17mcg    Patient was discharged to:     New medications: None  Changes to old medications: None  Medications that were stopped:     Items to follow up as outpatient:     Physical exam at the time of discharge:     General: Lying comfortably and in no acute distress  HEENT: NC/AT; EOMI, anicteric sclera; MMM  Neck: supple  Cardiovascular: +S1/S2, RRR. Systolic murmur heart on auscultation.   Respiratory: CTA B/L; no W/R/R  Gastrointestinal: soft, NT/ND; +BSx4  Extremities: WWP; bilateral pressure boots in place. Posterior bilateral lower leg pressure wounds, dressed appropriately, c/d/i.   Vascular: 2+ radial pulses B/L  Neurological: AAOx0      LABS & STUDIES:   #Discharge: do not delete    89F PMH dementia (axo at baseline), HTN, HLD, hypothyroidism presented to the ED for PEG placement due to dysphagia, resulting in FTT(patient of Dr. Loza) admitted to telemetry for management acute metabolic encephalopathy 2/2 to hypernatremia (Na 167) due to decreased oral intake and subsequent PEG placement.    Problem List/Main Diagnoses (system-based):   #Acute metabolic encephalopathy.   - Baseline AOx0-1  - hyperosmolar coma possible given elevated serum osmo. patient also severely hypernatremic. AMS likely multifactorial iso of poor PO intake, advanced dementia, hyperosmolarity with significant hypernatremia. Free T4 wnl. ammonia <10.     #Adult failure to thrive, Dysphagia, s/p PEG tube (8/9)  Has ~1w of decreased PO intake. Also seems to aspirate/cough every time she tries to eat.  Family was in touch w/ Stefano Carter/Dr. Loza, was referred to ED for PEG    Plan:  - Tube feeds per nutrition - Jevity 1.2 with goal rate of 47 ml/hr with LPS x1/day from 7617-6620 to provide 846 ml tube feed, 1115 calories, 62 gProt., and 683 ml free water. This is 22.5 nonprotein calories and 1.61 gProt. per kg current body weight 38.6 kg.     #Hypernatremia.   Likely 2/2 decrease PO intake   On presentation pt noted with Na 167, reduced to 164 with D5W  8/8: Free water deficit 2.1L accounting for insensible losses, s/p D5W   Resolved     #Dementia.   Home meds - Donepezil 10mg , Memantine 20mg, Trazadone 50mg, Quetiapine 25mg     Plan:  - c/w home meds via PEG tube    #HTN (hypertension)    Plan:  - c/w home meds via PEG tube    #HLD (hyperlipidemia).     Plan:  - c/w home meds via PEG tube    Plan:  - hold home meds, NPO    #Hypothyroid.   ·  Plan: Home med: levothyroxine 25mcg  - TSH elevated at 4.7, free T4 wnl    Plan:  - c/w home meds via PEG tube    Patient was discharged to: Rehab    New medications: None  Changes to old medications: None  Medications that were stopped: None    Items to follow up as outpatient: GI (Dr. Loza)    Physical exam at the time of discharge:     General: Lying comfortably and in no acute distress  HEENT: NC/AT; EOMI, anicteric sclera; MMM  Neck: supple  Cardiovascular: +S1/S2, RRR. Systolic murmur heart on auscultation.   Respiratory: CTA B/L; no W/R/R  Gastrointestinal: soft, NT/ND; +BSx4  Extremities: WWP; bilateral pressure boots in place. Posterior bilateral lower leg pressure wounds, dressed appropriately, c/d/i.   Vascular: 2+ radial pulses B/L  Neurological: AAOx0      LABS & STUDIES:   #Discharge: do not delete    89F PMH dementia (axo at baseline), HTN, HLD, hypothyroidism presented to the ED for PEG placement due to dysphagia, resulting in FTT(patient of Dr. Loza) admitted to telemetry for management acute metabolic encephalopathy 2/2 to hypernatremia (Na 167) due to decreased oral intake and subsequent PEG placement.    Problem List/Main Diagnoses (system-based):   #Acute metabolic encephalopathy.   - Baseline AOx0-1  - hyperosmolar coma possible given elevated serum osmo. patient also severely hypernatremic. AMS likely multifactorial iso of poor PO intake, advanced dementia, hyperosmolarity with significant hypernatremia. Free T4 wnl. ammonia <10.     #Adult failure to thrive, Dysphagia, s/p PEG tube (8/9)  Has ~1w of decreased PO intake. Also seems to aspirate/cough every time she tries to eat.  Family was in touch w/ Stefano Carter/Dr. Loza, was referred to ED for PEG    Plan:  NUTRITION RECOMMENDATIONS:   1. Enteral Nutrition Recommendations:  - Initiate enteral nutrition support 24 hours after PEG placement  - Recommend Jevity 1.2 with goal rate of 47 ml/hr with LPS x1/day from 7071-4378 to provide 846 ml tube feed, 1115 calories, 62 g Prot., and 683 ml free water. This is 22.5 nonprotein calories and 1.61 g Prot. per kg current body weight 38.6 kg.   - Recommend initiate and advance tube feed slowly, start at 10 mL/hr, advance by 10 mL every 10 hours, as pt is high risk for refeeding syndrome   - Monitor tolerance to TF and adjust, as necessary  ***Once pt demonstrates well tolerance to TF at continuous goal rate, pt able to trial bolus feeds:  - Recommend Jevity 1.2 via  mL per bolus three times/day, 140 mL bolus 1 time/day, with LPS 1x/day. This provides 860 total mL, 1,132 kcal, 62g protein 694 mL free water. Free water flushes per team.   - Sample bolus schedule: 240 mL @ 8am, 240 mL @ 12pm, 240 mL @ 4pm, 140 mL at 8pm  2. Continue to monitor lytes, specifically Mg, Phos and K+ for refeeding, replete prn  3. Appreciate weekly weight trends  4. Continue to monitor BMs to ensure consistent BMs every 1-2 days      #Hypernatremia.   Likely 2/2 decrease PO intake   On presentation pt noted with Na 167, reduced to 164 with D5W  8/8: Free water deficit 2.1L accounting for insensible losses, s/p D5W   Resolved     #Dementia.   Home meds - Donepezil 10mg , Memantine 20mg, Trazadone 50mg, Quetiapine 25mg     Plan:  - c/w home meds via PEG tube    #HTN (hypertension)    Plan:  - c/w home meds via PEG tube    #HLD (hyperlipidemia).     Plan:  - c/w home meds via PEG tube    Plan:  - hold home meds, NPO    #Hypothyroid.   ·  Plan: Home med: levothyroxine 25mcg  - TSH elevated at 4.7, free T4 wnl    Plan:  - c/w home meds via PEG tube    Patient was discharged to: Rehab    New medications: None  Changes to old medications: None  Medications that were stopped: None    Items to follow up as outpatient: GI (Dr. Loza)    Physical exam at the time of discharge:     General: Lying comfortably and in no acute distress  HEENT: NC/AT; EOMI, anicteric sclera; MMM  Neck: supple  Cardiovascular: +S1/S2, RRR. Systolic murmur heart on auscultation.   Respiratory: CTA B/L; no W/R/R  Gastrointestinal: soft, NT/ND; +BSx4  Extremities: WWP; bilateral pressure boots in place. Posterior bilateral lower leg pressure wounds, dressed appropriately, c/d/i.   Vascular: 2+ radial pulses B/L  Neurological: AAOx0      LABS & STUDIES:

## 2024-08-09 NOTE — PROGRESS NOTE ADULT - PROVIDER SPECIALTY LIST ADULT
Gastroenterology
Internal Medicine
Gastroenterology
Internal Medicine
Gastroenterology
Internal Medicine

## 2024-08-09 NOTE — PROGRESS NOTE ADULT - REASON FOR ADMISSION
Failure To Thrive

## 2024-08-09 NOTE — PROGRESS NOTE ADULT - PROBLEM SELECTOR PLAN 8
Hgb 7.8, MCV 75  ferritin 20, total iron 17, % sat 8, total binding capacity wnl.     Plan:  - c/w iron sucrose IVPB  - maintain active T/S  - monitor for signs of bleeding  - treat underlying medical conditions

## 2024-08-09 NOTE — PROGRESS NOTE ADULT - PROBLEM SELECTOR PLAN 1
#R/O possible hyperosmolar coma   - Baseline AOx0-1  - hyperosmolar coma possible given elevated serum osmo. patient also severely hypernatremic. AMS likely multifactorial iso of poor PO intake, advanced dementia, hyperosmolarity with significant hypernatremia. Free T4 wnl. ammonia <10.     Plan:  -Treat underlying hypernatremia as below

## 2024-08-09 NOTE — PRE-ANESTHESIA EVALUATION ADULT - NSANTHOSAYNRD_GEN_A_CORE
No. KAMI screening performed.  STOP BANG Legend: 0-2 = LOW Risk; 3-4 = INTERMEDIATE Risk; 5-8 = HIGH Risk

## 2024-08-09 NOTE — DISCHARGE NOTE PROVIDER - CARE PROVIDER_API CALL
Alejandro Loza  Gastroenterology  [address], [address] [address]  Phone: [phone]  Fax: [phone]  Follow Up Time: 1 week

## 2024-08-09 NOTE — PROGRESS NOTE ADULT - NUTRITIONAL ASSESSMENT
This patient has been assessed with a concern for Malnutrition and has been determined to have a diagnosis/diagnoses of Severe protein-calorie malnutrition and Underweight (BMI < 19).    This patient is being managed with:   Diet NPO-  Entered: Aug  6 2024  4:35AM  

## 2024-08-09 NOTE — DISCHARGE NOTE PROVIDER - NSDCMRMEDTOKEN_GEN_ALL_CORE_FT
Aricept ODT 10 mg oral tablet, disintegratin tab(s) orally once a day (at bedtime)  levothyroxine 25 mcg (0.025 mg) oral capsule: 1 cap(s) orally once a day  Lipitor 10 mg oral tablet: 1 tab(s) orally once a day  memantine 10 mg oral tablet: 2 tab(s) orally once a day  QUEtiapine 25 mg oral tablet: 1 tab(s) orally once a day  traZODone 50 mg oral tablet: 1 orally once a day

## 2024-08-09 NOTE — PROGRESS NOTE ADULT - PROBLEM SELECTOR PLAN 2
#Dysphagia   Has ~1w of decreased PO intake. Also seems to aspirate/cough every time she tries to eat.  Family was in touch w/ Stefano Carter/Dr. Loza, was referred to ED for PEG    Plan:  - PEG placement tomorrow by IR once hypernatremia resolved  - NPO until procedure  - Nutrition consulted, pending recs for nutrition via PEG tube  - glucose checks q4, if glucose <70 can give amp

## 2024-08-09 NOTE — PROGRESS NOTE ADULT - PROBLEM SELECTOR PLAN 4
Confirm home meds - Donepezil 10mg , Memantine 20mg, Trazadone 50mg, Quetiapine 25mg     Plan:  - holding oral medications (currently NPO)

## 2024-08-09 NOTE — DISCHARGE NOTE PROVIDER - DETAILS OF MALNUTRITION DIAGNOSIS/DIAGNOSES
This patient has been assessed with a concern for Malnutrition and was treated during this hospitalization for the following Nutrition diagnosis/diagnoses:     -  08/06/2024: Severe protein-calorie malnutrition   -  08/06/2024: Underweight (BMI < 19)

## 2024-08-09 NOTE — PROGRESS NOTE ADULT - PROBLEM SELECTOR PLAN 10
Pt was recently started on Levaquin outpatient, unclear for what type of infection   No leukocytosis since admission, A-febrile    Plan;  -Continue to monitor off Antibiotics, watch for any signs of infection   -F/u UA (pending collection)

## 2024-08-09 NOTE — DISCHARGE NOTE NURSING/CASE MANAGEMENT/SOCIAL WORK - NSDCPEFALRISK_GEN_ALL_CORE
For information on Fall & Injury Prevention, visit: [email or web address] OR  [email or web address] OR  https://www.cdc.gov/steadi/patient.html

## 2024-08-09 NOTE — DISCHARGE NOTE NURSING/CASE MANAGEMENT/SOCIAL WORK - PATIENT PORTAL LINK FT
You can access the FollowMyHealth Patient Portal offered by [facility] by registering at the following website: [email or web address]. By joining [employer]’s FollowMyHealth portal, you will also be able to view your health information using other applications (apps) compatible with our system.

## 2024-08-19 DIAGNOSIS — Z79.82 LONG TERM (CURRENT) USE OF ASPIRIN: ICD-10-CM

## 2024-08-19 DIAGNOSIS — F03.90 UNSPECIFIED DEMENTIA, UNSPECIFIED SEVERITY, WITHOUT BEHAVIORAL DISTURBANCE, PSYCHOTIC DISTURBANCE, MOOD DISTURBANCE, AND ANXIETY: ICD-10-CM

## 2024-08-19 DIAGNOSIS — R62.7 ADULT FAILURE TO THRIVE: ICD-10-CM

## 2024-08-19 DIAGNOSIS — G93.41 METABOLIC ENCEPHALOPATHY: ICD-10-CM

## 2024-08-19 DIAGNOSIS — D63.8 ANEMIA IN OTHER CHRONIC DISEASES CLASSIFIED ELSEWHERE: ICD-10-CM

## 2024-08-19 DIAGNOSIS — R13.10 DYSPHAGIA, UNSPECIFIED: ICD-10-CM

## 2024-08-19 DIAGNOSIS — Z41.8 ENCOUNTER FOR OTHER PROCEDURES FOR PURPOSES OTHER THAN REMEDYING HEALTH STATE: ICD-10-CM

## 2024-08-19 DIAGNOSIS — Z79.890 HORMONE REPLACEMENT THERAPY: ICD-10-CM

## 2024-08-19 DIAGNOSIS — E86.0 DEHYDRATION: ICD-10-CM

## 2024-08-19 DIAGNOSIS — E87.0 HYPEROSMOLALITY AND HYPERNATREMIA: ICD-10-CM

## 2024-08-19 DIAGNOSIS — E78.5 HYPERLIPIDEMIA, UNSPECIFIED: ICD-10-CM

## 2024-08-19 DIAGNOSIS — K29.70 GASTRITIS, UNSPECIFIED, WITHOUT BLEEDING: ICD-10-CM

## 2024-08-19 DIAGNOSIS — E86.1 HYPOVOLEMIA: ICD-10-CM

## 2024-08-19 DIAGNOSIS — E03.9 HYPOTHYROIDISM, UNSPECIFIED: ICD-10-CM

## 2024-08-19 DIAGNOSIS — L89.150 PRESSURE ULCER OF SACRAL REGION, UNSTAGEABLE: ICD-10-CM

## 2024-08-19 DIAGNOSIS — E43 UNSPECIFIED SEVERE PROTEIN-CALORIE MALNUTRITION: ICD-10-CM

## 2024-08-19 DIAGNOSIS — I10 ESSENTIAL (PRIMARY) HYPERTENSION: ICD-10-CM

## 2024-10-25 ENCOUNTER — EMERGENCY (EMERGENCY)
Facility: HOSPITAL | Age: 89
LOS: 1 days | Discharge: HOME | End: 2024-10-25
Attending: STUDENT IN AN ORGANIZED HEALTH CARE EDUCATION/TRAINING PROGRAM | Admitting: STUDENT IN AN ORGANIZED HEALTH CARE EDUCATION/TRAINING PROGRAM
Payer: MEDICARE

## 2024-10-25 VITALS
TEMPERATURE: 98 F | HEART RATE: 105 BPM | OXYGEN SATURATION: 97 % | SYSTOLIC BLOOD PRESSURE: 96 MMHG | WEIGHT: 80.03 LBS | DIASTOLIC BLOOD PRESSURE: 60 MMHG | RESPIRATION RATE: 16 BRPM

## 2024-10-25 VITALS
TEMPERATURE: 98 F | DIASTOLIC BLOOD PRESSURE: 64 MMHG | RESPIRATION RATE: 17 BRPM | SYSTOLIC BLOOD PRESSURE: 103 MMHG | HEART RATE: 104 BPM | OXYGEN SATURATION: 96 %

## 2024-10-25 PROCEDURE — 49465 FLUORO EXAM OF G/COLON TUBE: CPT

## 2024-10-25 PROCEDURE — 99284 EMERGENCY DEPT VISIT MOD MDM: CPT

## 2024-10-25 RX ORDER — IOHEXOL 350 MG/ML
30 INJECTION, SOLUTION INTRAVENOUS ONCE
Refills: 0 | Status: COMPLETED | OUTPATIENT
Start: 2024-10-25 | End: 2024-10-25

## 2024-10-25 RX ADMIN — IOHEXOL 30 MILLILITER(S): 350 INJECTION, SOLUTION INTRAVENOUS at 14:21

## 2024-10-25 NOTE — ED PROVIDER NOTE - NSFOLLOWUPINSTRUCTIONS_ED_ALL_ED_FT
You were seen in the Emergency Department for: PEG confirmation study    Please follow up with your primary physician and gastroenterologist. If you do not have a primary physician or specialist of your needs, please call [phone] to find one convenient for you. At this number you will be able to locate a provider who accepts your insurance, as well as locate the right specialist for your needs.    You should return to the Emergency Department if you feel any new/worsening/persistent symptoms including but not limited to: fever, chills, vomiting, chest pain, difficulty breathing, loss of consciousness, bleeding, uncontrolled pain, numbness/weakness of a body part

## 2024-10-25 NOTE — ED PROVIDER NOTE - PATIENT PORTAL LINK FT
You can access the FollowMyHealth Patient Portal offered by [facility] by registering at the following website: [email or web address]. By joining [employer]’s FollowMyHealth portal, you will also be able to view your health information using other applications (apps) compatible with our system.

## 2024-10-25 NOTE — ED PROVIDER NOTE - OBJECTIVE STATEMENT
89 year old female with history of dementia (Ax0 baseline), HTN, HLD, hypothyroidism, s/p PEG, known sacral wounds undergoing outpatient work up, presenting for dislodged PEG. Per  John Paul NP--patient pulled out her PEG, was replaced with new PEG (18 Fr.) by son without issues, now sent for formal confirmation study. No f/c, vomiting, or change in her mental status from baseline. Received seroquel for agitation prior to arrival which is baseline. HR runs 100-120 with soft BPs as her normal--per john paul.

## 2024-10-25 NOTE — ED PROVIDER NOTE - PROGRESS NOTE DETAILS
Aakash Hernandes MD: PEG study performed, appropriately in place, reviewed with Dr. Loza/Stefano TORRES at bedside on portable XR machine, MA home.

## 2024-10-25 NOTE — ED PROVIDER NOTE - CLINICAL SUMMARY MEDICAL DECISION MAKING FREE TEXT BOX
89 year old female with history of dementia (Ax0 baseline), HTN, HLD, hypothyroidism, s/p PEG, known sacral wounds undergoing outpatient work up, presenting for dislodged PEG. Overall at baseline mentation and vitals per reliable history from  NP Stefano -- patient well to known to him as family member -- would like PEG confirmation study and DC home without additional work up in context of her advanced age and poor baseline function -- limited GOC -- has close outpatient f/u, sees Dr. Loza. Will confirm PEG replacement via contrast enhanced abdominal XR, and dc otherwise.

## 2024-10-25 NOTE — ED ADULT TRIAGE NOTE - CHIEF COMPLAINT QUOTE
Pt. a&ox1 @ baseline, hx of Will Body dementia, p/w dislodged PEG tube at noon today. Son popped it back in as per RN Stefano who accompanied the pt. Here to confirm placement is still intact and OK. Pt. has hx of several pressure ulcers of various stages ; is in the process of being worked up for r/o osteomyelitis. Denies fevers at home currently. Pt. received 50mg Seroquel PTA by the son. As per Stefano, pt. BP runs low @ baseline with elevated HR.

## 2024-10-25 NOTE — ED PROVIDER NOTE - PHYSICAL EXAMINATION
Gen - Nontoxic, +contracted elderly woman, nonverbal  HEENT - NCAT, moist mucous membranes  Resp - CTAB, no increased WOB or tachypnea  CV -  s1/s2, no m/r/g  Abd - soft, NT, ND, PEG appears to be in place without drainage, no signs of surrounding skin infection, no guarding or rebound  MSK - no gross deformities  Extrem - no LE edema  Neuro - no focal motor or sensation deficits--limited exam due to advanced dementia  Skin - warm, well perfused Joel Cordoba MD/pediatrician pediatrician/Joel Cordoba MD

## 2024-10-25 NOTE — ED ADULT NURSE NOTE - OBJECTIVE STATEMENT
Pt presents for potential G-tube dislodgement. Pt presents with G-tube in place with small amount of redness and blood to g-tube site

## 2024-10-28 DIAGNOSIS — I10 ESSENTIAL (PRIMARY) HYPERTENSION: ICD-10-CM

## 2024-10-28 DIAGNOSIS — K94.23 GASTROSTOMY MALFUNCTION: ICD-10-CM

## 2024-10-28 DIAGNOSIS — E03.9 HYPOTHYROIDISM, UNSPECIFIED: ICD-10-CM

## 2024-10-28 DIAGNOSIS — E78.5 HYPERLIPIDEMIA, UNSPECIFIED: ICD-10-CM

## 2024-11-20 PROCEDURE — 99282 EMERGENCY DEPT VISIT SF MDM: CPT

## 2024-11-20 PROCEDURE — 49465 FLUORO EXAM OF G/COLON TUBE: CPT

## 2025-01-01 ENCOUNTER — INPATIENT (INPATIENT)
Facility: HOSPITAL | Age: 89
LOS: 4 days | DRG: 870 | End: 2025-04-10
Attending: STUDENT IN AN ORGANIZED HEALTH CARE EDUCATION/TRAINING PROGRAM | Admitting: INTERNAL MEDICINE
Payer: MEDICARE

## 2025-01-01 VITALS
TEMPERATURE: 101 F | DIASTOLIC BLOOD PRESSURE: 62 MMHG | SYSTOLIC BLOOD PRESSURE: 110 MMHG | WEIGHT: 98.11 LBS | HEART RATE: 121 BPM | RESPIRATION RATE: 23 BRPM | OXYGEN SATURATION: 77 %

## 2025-01-01 VITALS — HEART RATE: 42 BPM | RESPIRATION RATE: 3 BRPM

## 2025-01-01 LAB
-  CEFEPIME: SIGNIFICANT CHANGE UP
-  CEFTAZIDIME: SIGNIFICANT CHANGE UP
-  CIPROFLOXACIN: SIGNIFICANT CHANGE UP
-  IMIPENEM: SIGNIFICANT CHANGE UP
-  LEVOFLOXACIN: SIGNIFICANT CHANGE UP
-  MEROPENEM: SIGNIFICANT CHANGE UP
-  PIPERACILLIN/TAZOBACTAM: SIGNIFICANT CHANGE UP
ADD ON TEST-SPECIMEN IN LAB: SIGNIFICANT CHANGE UP
ADD ON TEST-SPECIMEN IN LAB: SIGNIFICANT CHANGE UP
ALBUMIN SERPL ELPH-MCNC: 2.1 G/DL — LOW (ref 3.3–5)
ALBUMIN SERPL ELPH-MCNC: 2.3 G/DL — LOW (ref 3.3–5)
ALBUMIN SERPL ELPH-MCNC: 2.3 G/DL — LOW (ref 3.3–5)
ALBUMIN SERPL ELPH-MCNC: 2.4 G/DL — LOW (ref 3.3–5)
ALBUMIN SERPL ELPH-MCNC: 2.6 G/DL — LOW (ref 3.3–5)
ALP SERPL-CCNC: 72 U/L — SIGNIFICANT CHANGE UP (ref 40–120)
ALP SERPL-CCNC: 74 U/L — SIGNIFICANT CHANGE UP (ref 40–120)
ALP SERPL-CCNC: 74 U/L — SIGNIFICANT CHANGE UP (ref 40–120)
ALP SERPL-CCNC: 75 U/L — SIGNIFICANT CHANGE UP (ref 40–120)
ALP SERPL-CCNC: 78 U/L — SIGNIFICANT CHANGE UP (ref 40–120)
ALT FLD-CCNC: 14 U/L — SIGNIFICANT CHANGE UP (ref 10–45)
ALT FLD-CCNC: 32 U/L — SIGNIFICANT CHANGE UP (ref 10–45)
ALT FLD-CCNC: 35 U/L — SIGNIFICANT CHANGE UP (ref 10–45)
ALT FLD-CCNC: 41 U/L — SIGNIFICANT CHANGE UP (ref 10–45)
ALT FLD-CCNC: 44 U/L — SIGNIFICANT CHANGE UP (ref 10–45)
AMMONIA BLD-MCNC: 14 UMOL/L — SIGNIFICANT CHANGE UP (ref 11–55)
ANION GAP SERPL CALC-SCNC: 10 MMOL/L — SIGNIFICANT CHANGE UP (ref 5–17)
ANION GAP SERPL CALC-SCNC: 11 MMOL/L — SIGNIFICANT CHANGE UP (ref 5–17)
ANION GAP SERPL CALC-SCNC: 11 MMOL/L — SIGNIFICANT CHANGE UP (ref 5–17)
ANION GAP SERPL CALC-SCNC: 13 MMOL/L — SIGNIFICANT CHANGE UP (ref 5–17)
ANION GAP SERPL CALC-SCNC: 4 MMOL/L — LOW (ref 5–17)
ANION GAP SERPL CALC-SCNC: 9 MMOL/L — SIGNIFICANT CHANGE UP (ref 5–17)
APPEARANCE UR: CLEAR — SIGNIFICANT CHANGE UP
APTT BLD: 31.9 SEC — SIGNIFICANT CHANGE UP (ref 24.5–35.6)
AST SERPL-CCNC: 20 U/L — SIGNIFICANT CHANGE UP (ref 10–40)
AST SERPL-CCNC: 44 U/L — HIGH (ref 10–40)
AST SERPL-CCNC: 54 U/L — HIGH (ref 10–40)
AST SERPL-CCNC: 80 U/L — HIGH (ref 10–40)
AST SERPL-CCNC: 81 U/L — HIGH (ref 10–40)
BASE EXCESS BLDA CALC-SCNC: 4.5 MMOL/L — HIGH (ref -2–3)
BASE EXCESS BLDA CALC-SCNC: 6.7 MMOL/L — HIGH (ref -2–3)
BASE EXCESS BLDA CALC-SCNC: 7.8 MMOL/L — HIGH (ref -2–3)
BASE EXCESS BLDA CALC-SCNC: 9.2 MMOL/L — HIGH (ref -2–3)
BASE EXCESS BLDA CALC-SCNC: 9.7 MMOL/L — HIGH (ref -2–3)
BASOPHILS # BLD AUTO: 0 K/UL — SIGNIFICANT CHANGE UP (ref 0–0.2)
BASOPHILS # BLD AUTO: 0 K/UL — SIGNIFICANT CHANGE UP (ref 0–0.2)
BASOPHILS # BLD AUTO: 0.03 K/UL — SIGNIFICANT CHANGE UP (ref 0–0.2)
BASOPHILS # BLD AUTO: 0.03 K/UL — SIGNIFICANT CHANGE UP (ref 0–0.2)
BASOPHILS NFR BLD AUTO: 0 % — SIGNIFICANT CHANGE UP (ref 0–2)
BASOPHILS NFR BLD AUTO: 0 % — SIGNIFICANT CHANGE UP (ref 0–2)
BASOPHILS NFR BLD AUTO: 0.1 % — SIGNIFICANT CHANGE UP (ref 0–2)
BASOPHILS NFR BLD AUTO: 0.2 % — SIGNIFICANT CHANGE UP (ref 0–2)
BILIRUB SERPL-MCNC: 0.2 MG/DL — SIGNIFICANT CHANGE UP (ref 0.2–1.2)
BILIRUB SERPL-MCNC: 0.3 MG/DL — SIGNIFICANT CHANGE UP (ref 0.2–1.2)
BILIRUB UR-MCNC: NEGATIVE — SIGNIFICANT CHANGE UP
BLANDM BLD POS QL PROBE: SIGNIFICANT CHANGE UP
BLD GP AB SCN SERPL QL: NEGATIVE — SIGNIFICANT CHANGE UP
BLD GP AB SCN SERPL QL: NEGATIVE — SIGNIFICANT CHANGE UP
BUN SERPL-MCNC: 28 MG/DL — HIGH (ref 7–23)
BUN SERPL-MCNC: 30 MG/DL — HIGH (ref 7–23)
BUN SERPL-MCNC: 31 MG/DL — HIGH (ref 7–23)
BUN SERPL-MCNC: 36 MG/DL — HIGH (ref 7–23)
BUN SERPL-MCNC: 43 MG/DL — HIGH (ref 7–23)
BUN SERPL-MCNC: 44 MG/DL — HIGH (ref 7–23)
CALCIUM SERPL-MCNC: 7.4 MG/DL — LOW (ref 8.4–10.5)
CALCIUM SERPL-MCNC: 7.5 MG/DL — LOW (ref 8.4–10.5)
CALCIUM SERPL-MCNC: 7.5 MG/DL — LOW (ref 8.4–10.5)
CALCIUM SERPL-MCNC: 7.7 MG/DL — LOW (ref 8.4–10.5)
CALCIUM SERPL-MCNC: 7.7 MG/DL — LOW (ref 8.4–10.5)
CALCIUM SERPL-MCNC: 8.7 MG/DL — SIGNIFICANT CHANGE UP (ref 8.4–10.5)
CHLORIDE SERPL-SCNC: 101 MMOL/L — SIGNIFICANT CHANGE UP (ref 96–108)
CHLORIDE SERPL-SCNC: 103 MMOL/L — SIGNIFICANT CHANGE UP (ref 96–108)
CHLORIDE SERPL-SCNC: 106 MMOL/L — SIGNIFICANT CHANGE UP (ref 96–108)
CHLORIDE SERPL-SCNC: 106 MMOL/L — SIGNIFICANT CHANGE UP (ref 96–108)
CHLORIDE SERPL-SCNC: 98 MMOL/L — SIGNIFICANT CHANGE UP (ref 96–108)
CHLORIDE SERPL-SCNC: 99 MMOL/L — SIGNIFICANT CHANGE UP (ref 96–108)
CK SERPL-CCNC: 140 U/L — SIGNIFICANT CHANGE UP (ref 25–170)
CO2 BLDA-SCNC: 30 MMOL/L — HIGH (ref 19–24)
CO2 BLDA-SCNC: 32 MMOL/L — HIGH (ref 19–24)
CO2 BLDA-SCNC: 32 MMOL/L — HIGH (ref 19–24)
CO2 BLDA-SCNC: 34 MMOL/L — HIGH (ref 19–24)
CO2 BLDA-SCNC: 35 MMOL/L — HIGH (ref 19–24)
CO2 SERPL-SCNC: 26 MMOL/L — SIGNIFICANT CHANGE UP (ref 22–31)
CO2 SERPL-SCNC: 29 MMOL/L — SIGNIFICANT CHANGE UP (ref 22–31)
CO2 SERPL-SCNC: 29 MMOL/L — SIGNIFICANT CHANGE UP (ref 22–31)
CO2 SERPL-SCNC: 30 MMOL/L — SIGNIFICANT CHANGE UP (ref 22–31)
CO2 SERPL-SCNC: 31 MMOL/L — SIGNIFICANT CHANGE UP (ref 22–31)
CO2 SERPL-SCNC: 37 MMOL/L — HIGH (ref 22–31)
COLOR SPEC: YELLOW — SIGNIFICANT CHANGE UP
CREAT SERPL-MCNC: 0.55 MG/DL — SIGNIFICANT CHANGE UP (ref 0.5–1.3)
CREAT SERPL-MCNC: 0.56 MG/DL — SIGNIFICANT CHANGE UP (ref 0.5–1.3)
CREAT SERPL-MCNC: 0.56 MG/DL — SIGNIFICANT CHANGE UP (ref 0.5–1.3)
CREAT SERPL-MCNC: 0.59 MG/DL — SIGNIFICANT CHANGE UP (ref 0.5–1.3)
CREAT SERPL-MCNC: 0.62 MG/DL — SIGNIFICANT CHANGE UP (ref 0.5–1.3)
CREAT SERPL-MCNC: 0.64 MG/DL — SIGNIFICANT CHANGE UP (ref 0.5–1.3)
CRP SERPL-MCNC: 33.2 MG/L — HIGH (ref 0–4)
CULTURE RESULTS: ABNORMAL
CULTURE RESULTS: SIGNIFICANT CHANGE UP
CULTURE RESULTS: SIGNIFICANT CHANGE UP
CYSTATIN C SERPL-MCNC: 1.48 MG/L — SIGNIFICANT CHANGE UP
DIFF PNL FLD: ABNORMAL
DIGOXIN SERPL-MCNC: 2.8 NG/ML — CRITICAL HIGH (ref 0.8–2)
EGFR: 84 ML/MIN/1.73M2 — SIGNIFICANT CHANGE UP
EGFR: 84 ML/MIN/1.73M2 — SIGNIFICANT CHANGE UP
EGFR: 85 ML/MIN/1.73M2 — SIGNIFICANT CHANGE UP
EGFR: 85 ML/MIN/1.73M2 — SIGNIFICANT CHANGE UP
EGFR: 86 ML/MIN/1.73M2 — SIGNIFICANT CHANGE UP
EGFR: 86 ML/MIN/1.73M2 — SIGNIFICANT CHANGE UP
EGFR: 87 ML/MIN/1.73M2 — SIGNIFICANT CHANGE UP
EGFR: 88 ML/MIN/1.73M2 — SIGNIFICANT CHANGE UP
EGFR: 88 ML/MIN/1.73M2 — SIGNIFICANT CHANGE UP
EOSINOPHIL # BLD AUTO: 0 K/UL — SIGNIFICANT CHANGE UP (ref 0–0.5)
EOSINOPHIL NFR BLD AUTO: 0 % — SIGNIFICANT CHANGE UP (ref 0–6)
ERYTHROCYTE [SEDIMENTATION RATE] IN BLOOD: 45 MM/HR — HIGH
FERRITIN SERPL-MCNC: 107 NG/ML — SIGNIFICANT CHANGE UP (ref 13–330)
FLUAV AG NPH QL: SIGNIFICANT CHANGE UP
FLUBV AG NPH QL: SIGNIFICANT CHANGE UP
GAS PNL BLDA: SIGNIFICANT CHANGE UP
GFR/BSA.PRED SERPLBLD CYS-BASED-ARV: 38 ML/MIN/1.73M2 — LOW
GLUCOSE SERPL-MCNC: 136 MG/DL — HIGH (ref 70–99)
GLUCOSE SERPL-MCNC: 154 MG/DL — HIGH (ref 70–99)
GLUCOSE SERPL-MCNC: 162 MG/DL — HIGH (ref 70–99)
GLUCOSE SERPL-MCNC: 163 MG/DL — HIGH (ref 70–99)
GLUCOSE SERPL-MCNC: 167 MG/DL — HIGH (ref 70–99)
GLUCOSE SERPL-MCNC: 191 MG/DL — HIGH (ref 70–99)
GLUCOSE UR QL: NEGATIVE MG/DL — SIGNIFICANT CHANGE UP
GRAM STN FLD: ABNORMAL
HCO3 BLDA-SCNC: 28 MMOL/L — SIGNIFICANT CHANGE UP (ref 21–28)
HCO3 BLDA-SCNC: 30 MMOL/L — HIGH (ref 21–28)
HCO3 BLDA-SCNC: 31 MMOL/L — HIGH (ref 21–28)
HCO3 BLDA-SCNC: 33 MMOL/L — HIGH (ref 21–28)
HCO3 BLDA-SCNC: 34 MMOL/L — HIGH (ref 21–28)
HCT VFR BLD CALC: 20.8 % — CRITICAL LOW (ref 34.5–45)
HCT VFR BLD CALC: 25.2 % — LOW (ref 34.5–45)
HCT VFR BLD CALC: 25.7 % — LOW (ref 34.5–45)
HCT VFR BLD CALC: 26.3 % — LOW (ref 34.5–45)
HCT VFR BLD CALC: 26.6 % — LOW (ref 34.5–45)
HCT VFR BLD CALC: 31.4 % — LOW (ref 34.5–45)
HGB BLD-MCNC: 6 G/DL — CRITICAL LOW (ref 11.5–15.5)
HGB BLD-MCNC: 7.5 G/DL — LOW (ref 11.5–15.5)
HGB BLD-MCNC: 7.5 G/DL — LOW (ref 11.5–15.5)
HGB BLD-MCNC: 7.7 G/DL — LOW (ref 11.5–15.5)
HGB BLD-MCNC: 7.8 G/DL — LOW (ref 11.5–15.5)
HGB BLD-MCNC: 9 G/DL — LOW (ref 11.5–15.5)
HOROWITZ INDEX BLDA+IHG-RTO: 40 — SIGNIFICANT CHANGE UP
HOROWITZ INDEX BLDA+IHG-RTO: 50 — SIGNIFICANT CHANGE UP
HOROWITZ INDEX BLDA+IHG-RTO: 50 — SIGNIFICANT CHANGE UP
HOROWITZ INDEX BLDA+IHG-RTO: 80 — SIGNIFICANT CHANGE UP
IMM GRANULOCYTES NFR BLD AUTO: 0.4 % — SIGNIFICANT CHANGE UP (ref 0–0.9)
IMM GRANULOCYTES NFR BLD AUTO: 0.7 % — SIGNIFICANT CHANGE UP (ref 0–0.9)
INR BLD: 1.01 — SIGNIFICANT CHANGE UP (ref 0.85–1.16)
IRON SATN MFR SERPL: 12 % — LOW (ref 14–50)
IRON SATN MFR SERPL: 23 UG/DL — LOW (ref 30–160)
KETONES UR-MCNC: NEGATIVE MG/DL — SIGNIFICANT CHANGE UP
LACTATE SERPL-SCNC: 1.3 MMOL/L — SIGNIFICANT CHANGE UP (ref 0.5–2)
LACTATE SERPL-SCNC: 1.5 MMOL/L — SIGNIFICANT CHANGE UP (ref 0.5–2)
LACTATE SERPL-SCNC: 1.7 MMOL/L — SIGNIFICANT CHANGE UP (ref 0.5–2)
LACTATE SERPL-SCNC: 3.8 MMOL/L — HIGH (ref 0.5–2)
LACTATE SERPL-SCNC: 4.4 MMOL/L — CRITICAL HIGH (ref 0.5–2)
LEGIONELLA AG UR QL: NEGATIVE — SIGNIFICANT CHANGE UP
LEUKOCYTE ESTERASE UR-ACNC: NEGATIVE — SIGNIFICANT CHANGE UP
LYMPHOCYTES # BLD AUTO: 0.52 K/UL — LOW (ref 1–3.3)
LYMPHOCYTES # BLD AUTO: 0.61 K/UL — LOW (ref 1–3.3)
LYMPHOCYTES # BLD AUTO: 1.06 K/UL — SIGNIFICANT CHANGE UP (ref 1–3.3)
LYMPHOCYTES # BLD AUTO: 1.25 K/UL — SIGNIFICANT CHANGE UP (ref 1–3.3)
LYMPHOCYTES # BLD AUTO: 1.8 % — LOW (ref 13–44)
LYMPHOCYTES # BLD AUTO: 2.6 % — LOW (ref 13–44)
LYMPHOCYTES # BLD AUTO: 5 % — LOW (ref 13–44)
LYMPHOCYTES # BLD AUTO: 7.3 % — LOW (ref 13–44)
MAGNESIUM SERPL-MCNC: 2 MG/DL — SIGNIFICANT CHANGE UP (ref 1.6–2.6)
MAGNESIUM SERPL-MCNC: 2.2 MG/DL — SIGNIFICANT CHANGE UP (ref 1.6–2.6)
MCHC RBC-ENTMCNC: 22 PG — LOW (ref 27–34)
MCHC RBC-ENTMCNC: 22.1 PG — LOW (ref 27–34)
MCHC RBC-ENTMCNC: 22.1 PG — LOW (ref 27–34)
MCHC RBC-ENTMCNC: 22.2 PG — LOW (ref 27–34)
MCHC RBC-ENTMCNC: 22.3 PG — LOW (ref 27–34)
MCHC RBC-ENTMCNC: 22.5 PG — LOW (ref 27–34)
MCHC RBC-ENTMCNC: 28.7 G/DL — LOW (ref 32–36)
MCHC RBC-ENTMCNC: 28.8 G/DL — LOW (ref 32–36)
MCHC RBC-ENTMCNC: 28.9 G/DL — LOW (ref 32–36)
MCHC RBC-ENTMCNC: 29.2 G/DL — LOW (ref 32–36)
MCHC RBC-ENTMCNC: 29.7 G/DL — LOW (ref 32–36)
MCHC RBC-ENTMCNC: 29.8 G/DL — LOW (ref 32–36)
MCV RBC AUTO: 74.3 FL — LOW (ref 80–100)
MCV RBC AUTO: 75 FL — LOW (ref 80–100)
MCV RBC AUTO: 75.6 FL — LOW (ref 80–100)
MCV RBC AUTO: 76.7 FL — LOW (ref 80–100)
MCV RBC AUTO: 77.1 FL — LOW (ref 80–100)
MCV RBC AUTO: 77.9 FL — LOW (ref 80–100)
METHOD TYPE: SIGNIFICANT CHANGE UP
METHOD TYPE: SIGNIFICANT CHANGE UP
MONOCYTES # BLD AUTO: 0.49 K/UL — SIGNIFICANT CHANGE UP (ref 0–0.9)
MONOCYTES # BLD AUTO: 0.61 K/UL — SIGNIFICANT CHANGE UP (ref 0–0.9)
MONOCYTES # BLD AUTO: 0.78 K/UL — SIGNIFICANT CHANGE UP (ref 0–0.9)
MONOCYTES # BLD AUTO: 0.96 K/UL — HIGH (ref 0–0.9)
MONOCYTES NFR BLD AUTO: 1.7 % — LOW (ref 2–14)
MONOCYTES NFR BLD AUTO: 2.6 % — SIGNIFICANT CHANGE UP (ref 2–14)
MONOCYTES NFR BLD AUTO: 3.1 % — SIGNIFICANT CHANGE UP (ref 2–14)
MONOCYTES NFR BLD AUTO: 6.6 % — SIGNIFICANT CHANGE UP (ref 2–14)
MRSA PCR RESULT.: NEGATIVE — SIGNIFICANT CHANGE UP
NEUTROPHILS # BLD AUTO: 12.46 K/UL — HIGH (ref 1.8–7.4)
NEUTROPHILS # BLD AUTO: 22.31 K/UL — HIGH (ref 1.8–7.4)
NEUTROPHILS # BLD AUTO: 22.58 K/UL — HIGH (ref 1.8–7.4)
NEUTROPHILS # BLD AUTO: 27.26 K/UL — HIGH (ref 1.8–7.4)
NEUTROPHILS NFR BLD AUTO: 85.5 % — HIGH (ref 43–77)
NEUTROPHILS NFR BLD AUTO: 91.1 % — HIGH (ref 43–77)
NEUTROPHILS NFR BLD AUTO: 94.8 % — HIGH (ref 43–77)
NEUTROPHILS NFR BLD AUTO: 94.8 % — HIGH (ref 43–77)
NITRITE UR-MCNC: NEGATIVE — SIGNIFICANT CHANGE UP
NRBC BLD AUTO-RTO: 0 /100 WBCS — SIGNIFICANT CHANGE UP (ref 0–0)
NT-PROBNP SERPL-SCNC: 3883 PG/ML — HIGH (ref 0–300)
ORGANISM # SPEC MICROSCOPIC CNT: ABNORMAL
ORGANISM # SPEC MICROSCOPIC CNT: ABNORMAL
ORGANISM # SPEC MICROSCOPIC CNT: SIGNIFICANT CHANGE UP
PCO2 BLDA: 37 MMHG — SIGNIFICANT CHANGE UP (ref 32–45)
PCO2 BLDA: 39 MMHG — SIGNIFICANT CHANGE UP (ref 32–45)
PCO2 BLDA: 41 MMHG — SIGNIFICANT CHANGE UP (ref 32–45)
PH BLDA: 7.47 — HIGH (ref 7.35–7.45)
PH BLDA: 7.5 — HIGH (ref 7.35–7.45)
PH BLDA: 7.52 — HIGH (ref 7.35–7.45)
PH BLDA: 7.53 — HIGH (ref 7.35–7.45)
PH BLDA: 7.53 — HIGH (ref 7.35–7.45)
PH UR: 5.5 — SIGNIFICANT CHANGE UP (ref 5–8)
PHOSPHATE SERPL-MCNC: 2.8 MG/DL — SIGNIFICANT CHANGE UP (ref 2.5–4.5)
PHOSPHATE SERPL-MCNC: 3.6 MG/DL — SIGNIFICANT CHANGE UP (ref 2.5–4.5)
PLATELET # BLD AUTO: 274 K/UL — SIGNIFICANT CHANGE UP (ref 150–400)
PLATELET # BLD AUTO: 344 K/UL — SIGNIFICANT CHANGE UP (ref 150–400)
PLATELET # BLD AUTO: 395 K/UL — SIGNIFICANT CHANGE UP (ref 150–400)
PLATELET # BLD AUTO: 402 K/UL — HIGH (ref 150–400)
PLATELET # BLD AUTO: 403 K/UL — HIGH (ref 150–400)
PLATELET # BLD AUTO: 424 K/UL — HIGH (ref 150–400)
PO2 BLDA: 133 MMHG — HIGH (ref 83–108)
PO2 BLDA: 175 MMHG — HIGH (ref 83–108)
PO2 BLDA: 181 MMHG — HIGH (ref 83–108)
PO2 BLDA: 230 MMHG — HIGH (ref 83–108)
PO2 BLDA: 74 MMHG — LOW (ref 83–108)
POTASSIUM SERPL-MCNC: 3.3 MMOL/L — LOW (ref 3.5–5.3)
POTASSIUM SERPL-MCNC: 3.8 MMOL/L — SIGNIFICANT CHANGE UP (ref 3.5–5.3)
POTASSIUM SERPL-MCNC: 4.6 MMOL/L — SIGNIFICANT CHANGE UP (ref 3.5–5.3)
POTASSIUM SERPL-MCNC: 4.6 MMOL/L — SIGNIFICANT CHANGE UP (ref 3.5–5.3)
POTASSIUM SERPL-MCNC: 5 MMOL/L — SIGNIFICANT CHANGE UP (ref 3.5–5.3)
POTASSIUM SERPL-MCNC: 5.1 MMOL/L — SIGNIFICANT CHANGE UP (ref 3.5–5.3)
POTASSIUM SERPL-SCNC: 3.3 MMOL/L — LOW (ref 3.5–5.3)
POTASSIUM SERPL-SCNC: 3.8 MMOL/L — SIGNIFICANT CHANGE UP (ref 3.5–5.3)
POTASSIUM SERPL-SCNC: 4.6 MMOL/L — SIGNIFICANT CHANGE UP (ref 3.5–5.3)
POTASSIUM SERPL-SCNC: 4.6 MMOL/L — SIGNIFICANT CHANGE UP (ref 3.5–5.3)
POTASSIUM SERPL-SCNC: 5 MMOL/L — SIGNIFICANT CHANGE UP (ref 3.5–5.3)
POTASSIUM SERPL-SCNC: 5.1 MMOL/L — SIGNIFICANT CHANGE UP (ref 3.5–5.3)
PROCALCITONIN SERPL-MCNC: 0.34 NG/ML — HIGH (ref 0.02–0.1)
PROT SERPL-MCNC: 5 G/DL — LOW (ref 6–8.3)
PROT SERPL-MCNC: 5.3 G/DL — LOW (ref 6–8.3)
PROT SERPL-MCNC: 5.3 G/DL — LOW (ref 6–8.3)
PROT SERPL-MCNC: 5.5 G/DL — LOW (ref 6–8.3)
PROT SERPL-MCNC: 6.2 G/DL — SIGNIFICANT CHANGE UP (ref 6–8.3)
PROT UR-MCNC: 300 MG/DL
PROTHROM AB SERPL-ACNC: 11.6 SEC — SIGNIFICANT CHANGE UP (ref 9.9–13.4)
RBC # BLD: 2.67 M/UL — LOW (ref 3.8–5.2)
RBC # BLD: 3.36 M/UL — LOW (ref 3.8–5.2)
RBC # BLD: 3.4 M/UL — LOW (ref 3.8–5.2)
RBC # BLD: 3.47 M/UL — LOW (ref 3.8–5.2)
RBC # BLD: 3.54 M/UL — LOW (ref 3.8–5.2)
RBC # BLD: 4.07 M/UL — SIGNIFICANT CHANGE UP (ref 3.8–5.2)
RBC # FLD: 17 % — HIGH (ref 10.3–14.5)
RBC # FLD: 17.1 % — HIGH (ref 10.3–14.5)
RBC # FLD: 17.1 % — HIGH (ref 10.3–14.5)
RBC # FLD: 17.2 % — HIGH (ref 10.3–14.5)
RBC # FLD: 17.3 % — HIGH (ref 10.3–14.5)
RBC # FLD: 17.3 % — HIGH (ref 10.3–14.5)
RH IG SCN BLD-IMP: POSITIVE — SIGNIFICANT CHANGE UP
RH IG SCN BLD-IMP: POSITIVE — SIGNIFICANT CHANGE UP
RSV RNA NPH QL NAA+NON-PROBE: SIGNIFICANT CHANGE UP
S AUREUS DNA NOSE QL NAA+PROBE: POSITIVE
S PNEUM AG UR QL: NEGATIVE — SIGNIFICANT CHANGE UP
SAO2 % BLDA: 100 % — HIGH (ref 94–98)
SAO2 % BLDA: 97 % — SIGNIFICANT CHANGE UP (ref 94–98)
SAO2 % BLDA: 98.9 % — HIGH (ref 94–98)
SAO2 % BLDA: 99.7 % — HIGH (ref 94–98)
SAO2 % BLDA: 99.9 % — HIGH (ref 94–98)
SARS-COV-2 RNA SPEC QL NAA+PROBE: SIGNIFICANT CHANGE UP
SODIUM SERPL-SCNC: 138 MMOL/L — SIGNIFICANT CHANGE UP (ref 135–145)
SODIUM SERPL-SCNC: 140 MMOL/L — SIGNIFICANT CHANGE UP (ref 135–145)
SODIUM SERPL-SCNC: 141 MMOL/L — SIGNIFICANT CHANGE UP (ref 135–145)
SODIUM SERPL-SCNC: 142 MMOL/L — SIGNIFICANT CHANGE UP (ref 135–145)
SODIUM SERPL-SCNC: 145 MMOL/L — SIGNIFICANT CHANGE UP (ref 135–145)
SODIUM SERPL-SCNC: 147 MMOL/L — HIGH (ref 135–145)
SOURCE RESPIRATORY: SIGNIFICANT CHANGE UP
SP GR SPEC: >1.03 — HIGH (ref 1–1.03)
SPECIMEN SOURCE: SIGNIFICANT CHANGE UP
T4 FREE+ TSH PNL SERPL: 9.6 UIU/ML — HIGH (ref 0.27–4.2)
TIBC SERPL-MCNC: 197 UG/DL — LOW (ref 220–430)
TRANSFERRIN SERPL-MCNC: 160 MG/DL — LOW (ref 200–360)
TROPONIN T, HIGH SENSITIVITY RESULT: 200 NG/L — CRITICAL HIGH (ref 0–51)
TROPONIN T, HIGH SENSITIVITY RESULT: 202 NG/L — CRITICAL HIGH (ref 0–51)
TROPONIN T, HIGH SENSITIVITY RESULT: 231 NG/L — CRITICAL HIGH (ref 0–51)
UIBC SERPL-MCNC: 174 UG/DL — SIGNIFICANT CHANGE UP (ref 110–370)
UROBILINOGEN FLD QL: 0.2 MG/DL — SIGNIFICANT CHANGE UP (ref 0.2–1)
VANCOMYCIN TROUGH SERPL-MCNC: 5.7 UG/ML — LOW (ref 10–20)
WBC # BLD: 14.57 K/UL — HIGH (ref 3.8–10.5)
WBC # BLD: 20.23 K/UL — HIGH (ref 3.8–10.5)
WBC # BLD: 22.7 K/UL — HIGH (ref 3.8–10.5)
WBC # BLD: 23.53 K/UL — HIGH (ref 3.8–10.5)
WBC # BLD: 24.81 K/UL — HIGH (ref 3.8–10.5)
WBC # BLD: 28.76 K/UL — HIGH (ref 3.8–10.5)
WBC # FLD AUTO: 14.57 K/UL — HIGH (ref 3.8–10.5)
WBC # FLD AUTO: 20.23 K/UL — HIGH (ref 3.8–10.5)
WBC # FLD AUTO: 22.7 K/UL — HIGH (ref 3.8–10.5)
WBC # FLD AUTO: 23.53 K/UL — HIGH (ref 3.8–10.5)
WBC # FLD AUTO: 24.81 K/UL — HIGH (ref 3.8–10.5)
WBC # FLD AUTO: 28.76 K/UL — HIGH (ref 3.8–10.5)

## 2025-01-01 PROCEDURE — 84100 ASSAY OF PHOSPHORUS: CPT

## 2025-01-01 PROCEDURE — 87186 SC STD MICRODIL/AGAR DIL: CPT

## 2025-01-01 PROCEDURE — 87040 BLOOD CULTURE FOR BACTERIA: CPT

## 2025-01-01 PROCEDURE — 99291 CRITICAL CARE FIRST HOUR: CPT

## 2025-01-01 PROCEDURE — 87637 SARSCOV2&INF A&B&RSV AMP PRB: CPT

## 2025-01-01 PROCEDURE — 87640 STAPH A DNA AMP PROBE: CPT

## 2025-01-01 PROCEDURE — 99498 ADVNCD CARE PLAN ADDL 30 MIN: CPT | Mod: 25

## 2025-01-01 PROCEDURE — 82962 GLUCOSE BLOOD TEST: CPT

## 2025-01-01 PROCEDURE — 70450 CT HEAD/BRAIN W/O DYE: CPT

## 2025-01-01 PROCEDURE — 82330 ASSAY OF CALCIUM: CPT

## 2025-01-01 PROCEDURE — 87641 MR-STAPH DNA AMP PROBE: CPT

## 2025-01-01 PROCEDURE — 84466 ASSAY OF TRANSFERRIN: CPT

## 2025-01-01 PROCEDURE — 83605 ASSAY OF LACTIC ACID: CPT

## 2025-01-01 PROCEDURE — 82550 ASSAY OF CK (CPK): CPT

## 2025-01-01 PROCEDURE — 71045 X-RAY EXAM CHEST 1 VIEW: CPT | Mod: 26,76

## 2025-01-01 PROCEDURE — 96375 TX/PRO/DX INJ NEW DRUG ADDON: CPT

## 2025-01-01 PROCEDURE — 71045 X-RAY EXAM CHEST 1 VIEW: CPT

## 2025-01-01 PROCEDURE — 85610 PROTHROMBIN TIME: CPT

## 2025-01-01 PROCEDURE — 70450 CT HEAD/BRAIN W/O DYE: CPT | Mod: 26

## 2025-01-01 PROCEDURE — 80048 BASIC METABOLIC PNL TOTAL CA: CPT

## 2025-01-01 PROCEDURE — 99233 SBSQ HOSP IP/OBS HIGH 50: CPT

## 2025-01-01 PROCEDURE — 82610 CYSTATIN C: CPT

## 2025-01-01 PROCEDURE — 80162 ASSAY OF DIGOXIN TOTAL: CPT

## 2025-01-01 PROCEDURE — 95720 EEG PHY/QHP EA INCR W/VEEG: CPT

## 2025-01-01 PROCEDURE — 82728 ASSAY OF FERRITIN: CPT

## 2025-01-01 PROCEDURE — 84484 ASSAY OF TROPONIN QUANT: CPT

## 2025-01-01 PROCEDURE — 72193 CT PELVIS W/DYE: CPT | Mod: 26

## 2025-01-01 PROCEDURE — 86900 BLOOD TYPING SEROLOGIC ABO: CPT

## 2025-01-01 PROCEDURE — 85025 COMPLETE CBC W/AUTO DIFF WBC: CPT

## 2025-01-01 PROCEDURE — 99223 1ST HOSP IP/OBS HIGH 75: CPT

## 2025-01-01 PROCEDURE — 99291 CRITICAL CARE FIRST HOUR: CPT | Mod: 25

## 2025-01-01 PROCEDURE — 81001 URINALYSIS AUTO W/SCOPE: CPT

## 2025-01-01 PROCEDURE — 71260 CT THORAX DX C+: CPT

## 2025-01-01 PROCEDURE — 0225U NFCT DS DNA&RNA 21 SARSCOV2: CPT

## 2025-01-01 PROCEDURE — ZZZZZ: CPT

## 2025-01-01 PROCEDURE — 86901 BLOOD TYPING SEROLOGIC RH(D): CPT

## 2025-01-01 PROCEDURE — 76604 US EXAM CHEST: CPT | Mod: 26,GC

## 2025-01-01 PROCEDURE — 99497 ADVNCD CARE PLAN 30 MIN: CPT | Mod: 25

## 2025-01-01 PROCEDURE — 85730 THROMBOPLASTIN TIME PARTIAL: CPT

## 2025-01-01 PROCEDURE — 87899 AGENT NOS ASSAY W/OPTIC: CPT

## 2025-01-01 PROCEDURE — 72128 CT CHEST SPINE W/O DYE: CPT | Mod: 26

## 2025-01-01 PROCEDURE — 87070 CULTURE OTHR SPECIMN AEROBIC: CPT

## 2025-01-01 PROCEDURE — 87205 SMEAR GRAM STAIN: CPT

## 2025-01-01 PROCEDURE — 84132 ASSAY OF SERUM POTASSIUM: CPT

## 2025-01-01 PROCEDURE — 84443 ASSAY THYROID STIM HORMONE: CPT

## 2025-01-01 PROCEDURE — 72193 CT PELVIS W/DYE: CPT

## 2025-01-01 PROCEDURE — 76937 US GUIDE VASCULAR ACCESS: CPT | Mod: 26

## 2025-01-01 PROCEDURE — 99232 SBSQ HOSP IP/OBS MODERATE 35: CPT

## 2025-01-01 PROCEDURE — 86140 C-REACTIVE PROTEIN: CPT

## 2025-01-01 PROCEDURE — 95708 EEG WO VID EA 12-26HR UNMNTR: CPT

## 2025-01-01 PROCEDURE — 82803 BLOOD GASES ANY COMBINATION: CPT

## 2025-01-01 PROCEDURE — 36620 INSERTION CATHETER ARTERY: CPT

## 2025-01-01 PROCEDURE — 85027 COMPLETE CBC AUTOMATED: CPT

## 2025-01-01 PROCEDURE — 94003 VENT MGMT INPAT SUBQ DAY: CPT

## 2025-01-01 PROCEDURE — 99239 HOSP IP/OBS DSCHRG MGMT >30: CPT

## 2025-01-01 PROCEDURE — 84295 ASSAY OF SERUM SODIUM: CPT

## 2025-01-01 PROCEDURE — 83880 ASSAY OF NATRIURETIC PEPTIDE: CPT

## 2025-01-01 PROCEDURE — 85652 RBC SED RATE AUTOMATED: CPT

## 2025-01-01 PROCEDURE — 93010 ELECTROCARDIOGRAM REPORT: CPT

## 2025-01-01 PROCEDURE — 80053 COMPREHEN METABOLIC PANEL: CPT

## 2025-01-01 PROCEDURE — 96374 THER/PROPH/DIAG INJ IV PUSH: CPT

## 2025-01-01 PROCEDURE — 83735 ASSAY OF MAGNESIUM: CPT

## 2025-01-01 PROCEDURE — 36415 COLL VENOUS BLD VENIPUNCTURE: CPT

## 2025-01-01 PROCEDURE — 80202 ASSAY OF VANCOMYCIN: CPT

## 2025-01-01 PROCEDURE — 86850 RBC ANTIBODY SCREEN: CPT

## 2025-01-01 PROCEDURE — 83540 ASSAY OF IRON: CPT

## 2025-01-01 PROCEDURE — 95700 EEG CONT REC W/VID EEG TECH: CPT

## 2025-01-01 PROCEDURE — 84145 PROCALCITONIN (PCT): CPT

## 2025-01-01 PROCEDURE — 71260 CT THORAX DX C+: CPT | Mod: 26

## 2025-01-01 PROCEDURE — 82140 ASSAY OF AMMONIA: CPT

## 2025-01-01 PROCEDURE — 83550 IRON BINDING TEST: CPT

## 2025-01-01 PROCEDURE — 84439 ASSAY OF FREE THYROXINE: CPT

## 2025-01-01 PROCEDURE — 72128 CT CHEST SPINE W/O DYE: CPT

## 2025-01-01 PROCEDURE — 93005 ELECTROCARDIOGRAM TRACING: CPT

## 2025-01-01 RX ORDER — GLYCOPYRROLATE 0.2 MG/ML
0.4 INJECTION INTRAMUSCULAR; INTRAVENOUS EVERY 6 HOURS
Refills: 0 | Status: DISCONTINUED | OUTPATIENT
Start: 2025-01-01 | End: 2025-01-01

## 2025-01-01 RX ORDER — LEVETIRACETAM 10 MG/ML
500 INJECTION, SOLUTION INTRAVENOUS EVERY 12 HOURS
Refills: 0 | Status: DISCONTINUED | OUTPATIENT
Start: 2025-01-01 | End: 2025-01-01

## 2025-01-01 RX ORDER — HEPARIN SODIUM 1000 [USP'U]/ML
5000 INJECTION INTRAVENOUS; SUBCUTANEOUS EVERY 12 HOURS
Refills: 0 | Status: DISCONTINUED | OUTPATIENT
Start: 2025-01-01 | End: 2025-01-01

## 2025-01-01 RX ORDER — LORAZEPAM 4 MG/ML
1 VIAL (ML) INJECTION EVERY 4 HOURS
Refills: 0 | Status: DISCONTINUED | OUTPATIENT
Start: 2025-01-01 | End: 2025-01-01

## 2025-01-01 RX ORDER — SODIUM CHLORIDE 9 G/1000ML
1000 INJECTION, SOLUTION INTRAVENOUS
Refills: 0 | Status: DISCONTINUED | OUTPATIENT
Start: 2025-01-01 | End: 2025-01-01

## 2025-01-01 RX ORDER — HYDROCORTISONE 20 MG
50 TABLET ORAL EVERY 12 HOURS
Refills: 0 | Status: DISCONTINUED | OUTPATIENT
Start: 2025-01-01 | End: 2025-01-01

## 2025-01-01 RX ORDER — FENTANYL CITRATE-0.9 % NACL/PF 100MCG/2ML
0.5 SYRINGE (ML) INTRAVENOUS
Qty: 2500 | Refills: 0 | Status: DISCONTINUED | OUTPATIENT
Start: 2025-01-01 | End: 2025-01-01

## 2025-01-01 RX ORDER — CLINDAMYCIN PHOSPHATE 150 MG/ML
900 VIAL (ML) INJECTION ONCE
Refills: 0 | Status: COMPLETED | OUTPATIENT
Start: 2025-01-01 | End: 2025-01-01

## 2025-01-01 RX ORDER — CLINDAMYCIN PHOSPHATE 150 MG/ML
900 VIAL (ML) INJECTION EVERY 8 HOURS
Refills: 0 | Status: DISCONTINUED | OUTPATIENT
Start: 2025-01-01 | End: 2025-01-01

## 2025-01-01 RX ORDER — ACETAMINOPHEN 500 MG/5ML
675 LIQUID (ML) ORAL ONCE
Refills: 0 | Status: COMPLETED | OUTPATIENT
Start: 2025-01-01 | End: 2025-01-01

## 2025-01-01 RX ORDER — LORAZEPAM 4 MG/ML
1 VIAL (ML) INJECTION EVERY 6 HOURS
Refills: 0 | Status: DISCONTINUED | OUTPATIENT
Start: 2025-01-01 | End: 2025-01-01

## 2025-01-01 RX ORDER — LEVETIRACETAM 10 MG/ML
2000 INJECTION, SOLUTION INTRAVENOUS ONCE
Refills: 0 | Status: DISCONTINUED | OUTPATIENT
Start: 2025-01-01 | End: 2025-01-01

## 2025-01-01 RX ORDER — NOREPINEPHRINE BITARTRATE 8 MG
0.05 SOLUTION INTRAVENOUS
Qty: 8 | Refills: 0 | Status: DISCONTINUED | OUTPATIENT
Start: 2025-01-01 | End: 2025-01-01

## 2025-01-01 RX ORDER — LORAZEPAM 4 MG/ML
1 VIAL (ML) INJECTION ONCE
Refills: 0 | Status: DISCONTINUED | OUTPATIENT
Start: 2025-01-01 | End: 2025-01-01

## 2025-01-01 RX ORDER — PIPERACILLIN-TAZO-DEXTROSE,ISO 3.375G/5
4.5 IV SOLUTION, PIGGYBACK PREMIX FROZEN(ML) INTRAVENOUS ONCE
Refills: 0 | Status: COMPLETED | OUTPATIENT
Start: 2025-01-01 | End: 2025-01-01

## 2025-01-01 RX ORDER — PROPOFOL 10 MG/ML
25 INJECTION, EMULSION INTRAVENOUS
Qty: 1000 | Refills: 0 | Status: DISCONTINUED | OUTPATIENT
Start: 2025-01-01 | End: 2025-01-01

## 2025-01-01 RX ORDER — LORAZEPAM 4 MG/ML
2 VIAL (ML) INJECTION ONCE
Refills: 0 | Status: DISCONTINUED | OUTPATIENT
Start: 2025-01-01 | End: 2025-01-01

## 2025-01-01 RX ORDER — VANCOMYCIN HCL IN 5 % DEXTROSE 1.5G/250ML
750 PLASTIC BAG, INJECTION (ML) INTRAVENOUS EVERY 24 HOURS
Refills: 0 | Status: DISCONTINUED | OUTPATIENT
Start: 2025-01-01 | End: 2025-01-01

## 2025-01-01 RX ORDER — PIPERACILLIN-TAZO-DEXTROSE,ISO 3.375G/5
3.38 IV SOLUTION, PIGGYBACK PREMIX FROZEN(ML) INTRAVENOUS EVERY 8 HOURS
Refills: 0 | Status: DISCONTINUED | OUTPATIENT
Start: 2025-01-01 | End: 2025-01-01

## 2025-01-01 RX ORDER — ACETAMINOPHEN 500 MG/5ML
1000 LIQUID (ML) ORAL ONCE
Refills: 0 | Status: COMPLETED | OUTPATIENT
Start: 2025-01-01 | End: 2025-01-01

## 2025-01-01 RX ORDER — PROPOFOL 10 MG/ML
75 INJECTION, EMULSION INTRAVENOUS
Qty: 1000 | Refills: 0 | Status: DISCONTINUED | OUTPATIENT
Start: 2025-01-01 | End: 2025-01-01

## 2025-01-01 RX ORDER — ACETAMINOPHEN 500 MG/5ML
725 LIQUID (ML) ORAL ONCE
Refills: 0 | Status: DISCONTINUED | OUTPATIENT
Start: 2025-01-01 | End: 2025-01-01

## 2025-01-01 RX ORDER — ATORVASTATIN CALCIUM 80 MG/1
10 TABLET, FILM COATED ORAL EVERY 24 HOURS
Refills: 0 | Status: DISCONTINUED | OUTPATIENT
Start: 2025-01-01 | End: 2025-01-01

## 2025-01-01 RX ORDER — ASPIRIN 325 MG
0 TABLET ORAL
Refills: 0 | DISCHARGE

## 2025-01-01 RX ORDER — AZITHROMYCIN 250 MG
500 CAPSULE ORAL EVERY 24 HOURS
Refills: 0 | Status: DISCONTINUED | OUTPATIENT
Start: 2025-01-01 | End: 2025-01-01

## 2025-01-01 RX ORDER — METOPROLOL SUCCINATE 50 MG/1
12.5 TABLET, EXTENDED RELEASE ORAL
Refills: 0 | DISCHARGE

## 2025-01-01 RX ORDER — MIDODRINE HYDROCHLORIDE 5 MG/1
1 TABLET ORAL
Refills: 0 | DISCHARGE

## 2025-01-01 RX ORDER — LEVETIRACETAM 10 MG/ML
3000 INJECTION, SOLUTION INTRAVENOUS ONCE
Refills: 0 | Status: COMPLETED | OUTPATIENT
Start: 2025-01-01 | End: 2025-01-01

## 2025-01-01 RX ORDER — DIGOXIN 250 UG/1
1 TABLET ORAL
Refills: 0 | DISCHARGE

## 2025-01-01 RX ORDER — LORAZEPAM 4 MG/ML
0.5 VIAL (ML) INJECTION EVERY 4 HOURS
Refills: 0 | Status: DISCONTINUED | OUTPATIENT
Start: 2025-01-01 | End: 2025-01-01

## 2025-01-01 RX ORDER — VANCOMYCIN HCL IN 5 % DEXTROSE 1.5G/250ML
750 PLASTIC BAG, INJECTION (ML) INTRAVENOUS EVERY 12 HOURS
Refills: 0 | Status: DISCONTINUED | OUTPATIENT
Start: 2025-01-01 | End: 2025-01-01

## 2025-01-01 RX ORDER — FENTANYL CITRATE-0.9 % NACL/PF 100MCG/2ML
1 SYRINGE (ML) INTRAVENOUS
Qty: 2500 | Refills: 0 | Status: DISCONTINUED | OUTPATIENT
Start: 2025-01-01 | End: 2025-01-01

## 2025-01-01 RX ORDER — HYDROCORTISONE 20 MG
50 TABLET ORAL EVERY 6 HOURS
Refills: 0 | Status: DISCONTINUED | OUTPATIENT
Start: 2025-01-01 | End: 2025-01-01

## 2025-01-01 RX ORDER — LEVOTHYROXINE SODIUM 300 MCG
25 TABLET ORAL EVERY 24 HOURS
Refills: 0 | Status: DISCONTINUED | OUTPATIENT
Start: 2025-01-01 | End: 2025-01-01

## 2025-01-01 RX ORDER — DEXMEDETOMIDINE HYDROCHLORIDE IN SODIUM CHLORIDE 4 UG/ML
0.2 INJECTION INTRAVENOUS
Qty: 400 | Refills: 0 | Status: DISCONTINUED | OUTPATIENT
Start: 2025-01-01 | End: 2025-01-01

## 2025-01-01 RX ORDER — CLINDAMYCIN PHOSPHATE 150 MG/ML
VIAL (ML) INJECTION
Refills: 0 | Status: DISCONTINUED | OUTPATIENT
Start: 2025-01-01 | End: 2025-01-01

## 2025-01-01 RX ORDER — SODIUM CHLORIDE 9 G/1000ML
500 INJECTION, SOLUTION INTRAVENOUS ONCE
Refills: 0 | Status: COMPLETED | OUTPATIENT
Start: 2025-01-01 | End: 2025-01-01

## 2025-01-01 RX ORDER — PIPERACILLIN-TAZO-DEXTROSE,ISO 3.375G/5
3.38 IV SOLUTION, PIGGYBACK PREMIX FROZEN(ML) INTRAVENOUS ONCE
Refills: 0 | Status: DISCONTINUED | OUTPATIENT
Start: 2025-01-01 | End: 2025-01-01

## 2025-01-01 RX ORDER — SODIUM CHLORIDE 9 G/1000ML
1000 INJECTION, SOLUTION INTRAVENOUS ONCE
Refills: 0 | Status: COMPLETED | OUTPATIENT
Start: 2025-01-01 | End: 2025-01-01

## 2025-01-01 RX ORDER — HEPARIN SODIUM 1000 [USP'U]/ML
5000 INJECTION INTRAVENOUS; SUBCUTANEOUS EVERY 8 HOURS
Refills: 0 | Status: DISCONTINUED | OUTPATIENT
Start: 2025-01-01 | End: 2025-01-01

## 2025-01-01 RX ORDER — HYDROMORPHONE/SOD CHLOR,ISO/PF 2 MG/10 ML
1 SYRINGE (ML) INJECTION
Refills: 0 | Status: DISCONTINUED | OUTPATIENT
Start: 2025-01-01 | End: 2025-01-01

## 2025-01-01 RX ORDER — LORAZEPAM 4 MG/ML
0.25 VIAL (ML) INJECTION EVERY 4 HOURS
Refills: 0 | Status: DISCONTINUED | OUTPATIENT
Start: 2025-01-01 | End: 2025-01-01

## 2025-01-01 RX ORDER — PIPERACILLIN-TAZO-DEXTROSE,ISO 3.375G/5
4.5 IV SOLUTION, PIGGYBACK PREMIX FROZEN(ML) INTRAVENOUS EVERY 8 HOURS
Refills: 0 | Status: DISCONTINUED | OUTPATIENT
Start: 2025-01-01 | End: 2025-01-01

## 2025-01-01 RX ORDER — VANCOMYCIN HCL IN 5 % DEXTROSE 1.5G/250ML
1000 PLASTIC BAG, INJECTION (ML) INTRAVENOUS ONCE
Refills: 0 | Status: DISCONTINUED | OUTPATIENT
Start: 2025-01-01 | End: 2025-01-01

## 2025-01-01 RX ADMIN — Medication 50 MILLIGRAM(S): at 07:43

## 2025-01-01 RX ADMIN — Medication 25 GRAM(S): at 06:13

## 2025-01-01 RX ADMIN — Medication 50 MILLIGRAM(S): at 11:25

## 2025-01-01 RX ADMIN — Medication 25 GRAM(S): at 21:09

## 2025-01-01 RX ADMIN — Medication 200 GRAM(S): at 01:00

## 2025-01-01 RX ADMIN — PROPOFOL 7.32 MICROGRAM(S)/KG/MIN: 10 INJECTION, EMULSION INTRAVENOUS at 10:15

## 2025-01-01 RX ADMIN — Medication 50 MILLIGRAM(S): at 12:24

## 2025-01-01 RX ADMIN — PROPOFOL 7.32 MICROGRAM(S)/KG/MIN: 10 INJECTION, EMULSION INTRAVENOUS at 18:54

## 2025-01-01 RX ADMIN — Medication 250 MILLIGRAM(S): at 03:44

## 2025-01-01 RX ADMIN — SODIUM CHLORIDE 100 MILLILITER(S): 9 INJECTION, SOLUTION INTRAVENOUS at 09:30

## 2025-01-01 RX ADMIN — Medication 270 MILLIGRAM(S): at 00:40

## 2025-01-01 RX ADMIN — Medication 50 MILLIGRAM(S): at 12:30

## 2025-01-01 RX ADMIN — Medication 15 MILLILITER(S): at 05:03

## 2025-01-01 RX ADMIN — HEPARIN SODIUM 5000 UNIT(S): 1000 INJECTION INTRAVENOUS; SUBCUTANEOUS at 17:23

## 2025-01-01 RX ADMIN — Medication 50 MILLIGRAM(S): at 17:23

## 2025-01-01 RX ADMIN — SODIUM CHLORIDE 100 MILLILITER(S): 9 INJECTION, SOLUTION INTRAVENOUS at 16:00

## 2025-01-01 RX ADMIN — ATORVASTATIN CALCIUM 10 MILLIGRAM(S): 80 TABLET, FILM COATED ORAL at 00:05

## 2025-01-01 RX ADMIN — Medication 2 MILLIGRAM(S): at 15:25

## 2025-01-01 RX ADMIN — Medication 50 MILLIGRAM(S): at 05:38

## 2025-01-01 RX ADMIN — Medication 15 MILLILITER(S): at 17:23

## 2025-01-01 RX ADMIN — SODIUM CHLORIDE 1000 MILLILITER(S): 9 INJECTION, SOLUTION INTRAVENOUS at 18:19

## 2025-01-01 RX ADMIN — Medication 1 APPLICATION(S): at 05:39

## 2025-01-01 RX ADMIN — Medication 25 GRAM(S): at 14:25

## 2025-01-01 RX ADMIN — Medication 1 APPLICATION(S): at 05:09

## 2025-01-01 RX ADMIN — Medication 25 MICROGRAM(S): at 05:11

## 2025-01-01 RX ADMIN — LEVETIRACETAM 500 MILLIGRAM(S): 10 INJECTION, SOLUTION INTRAVENOUS at 23:33

## 2025-01-01 RX ADMIN — PROPOFOL 22 MICROGRAM(S)/KG/MIN: 10 INJECTION, EMULSION INTRAVENOUS at 12:15

## 2025-01-01 RX ADMIN — PROPOFOL 7.32 MICROGRAM(S)/KG/MIN: 10 INJECTION, EMULSION INTRAVENOUS at 19:33

## 2025-01-01 RX ADMIN — Medication 2 MILLIGRAM(S): at 00:41

## 2025-01-01 RX ADMIN — LEVETIRACETAM 400 MILLIGRAM(S): 10 INJECTION, SOLUTION INTRAVENOUS at 22:00

## 2025-01-01 RX ADMIN — Medication 40 MILLIGRAM(S): at 11:58

## 2025-01-01 RX ADMIN — Medication 25 MICROGRAM(S): at 06:14

## 2025-01-01 RX ADMIN — Medication 15 MILLILITER(S): at 05:08

## 2025-01-01 RX ADMIN — Medication 25 GRAM(S): at 13:33

## 2025-01-01 RX ADMIN — Medication 1 APPLICATION(S): at 06:13

## 2025-01-01 RX ADMIN — Medication 50 MILLIGRAM(S): at 17:11

## 2025-01-01 RX ADMIN — Medication 25 GRAM(S): at 14:59

## 2025-01-01 RX ADMIN — Medication 15 MILLILITER(S): at 06:13

## 2025-01-01 RX ADMIN — Medication 25 GRAM(S): at 23:33

## 2025-01-01 RX ADMIN — SODIUM CHLORIDE 100 MILLILITER(S): 9 INJECTION, SOLUTION INTRAVENOUS at 21:59

## 2025-01-01 RX ADMIN — Medication 25 GRAM(S): at 17:28

## 2025-01-01 RX ADMIN — Medication 250 MILLIGRAM(S): at 02:10

## 2025-01-01 RX ADMIN — PROPOFOL 7.32 MICROGRAM(S)/KG/MIN: 10 INJECTION, EMULSION INTRAVENOUS at 02:30

## 2025-01-01 RX ADMIN — Medication 25 GRAM(S): at 06:07

## 2025-01-01 RX ADMIN — Medication 50 MILLIGRAM(S): at 00:04

## 2025-01-01 RX ADMIN — LEVETIRACETAM 400 MILLIGRAM(S): 10 INJECTION, SOLUTION INTRAVENOUS at 21:08

## 2025-01-01 RX ADMIN — Medication 15 MILLILITER(S): at 05:38

## 2025-01-01 RX ADMIN — Medication 50 MILLIGRAM(S): at 05:12

## 2025-01-01 RX ADMIN — Medication 2.44 MICROGRAM(S)/KG/HR: at 18:32

## 2025-01-01 RX ADMIN — Medication 2 MILLIGRAM(S): at 15:35

## 2025-01-01 RX ADMIN — SODIUM CHLORIDE 250 MILLILITER(S): 9 INJECTION, SOLUTION INTRAVENOUS at 11:25

## 2025-01-01 RX ADMIN — Medication 1 APPLICATION(S): at 06:06

## 2025-01-01 RX ADMIN — Medication 25 GRAM(S): at 00:04

## 2025-01-01 RX ADMIN — Medication 25 GRAM(S): at 05:11

## 2025-01-01 RX ADMIN — Medication 2 MILLIGRAM(S): at 09:08

## 2025-01-01 RX ADMIN — LEVETIRACETAM 400 MILLIGRAM(S): 10 INJECTION, SOLUTION INTRAVENOUS at 09:53

## 2025-01-01 RX ADMIN — Medication 50 MILLIGRAM(S): at 00:40

## 2025-01-01 RX ADMIN — Medication 25 MICROGRAM(S): at 05:39

## 2025-01-01 RX ADMIN — Medication 25 GRAM(S): at 21:59

## 2025-01-01 RX ADMIN — Medication 50 MILLIGRAM(S): at 22:32

## 2025-01-01 RX ADMIN — Medication 1 MILLIGRAM(S): at 09:48

## 2025-01-01 RX ADMIN — LEVETIRACETAM 400 MILLIGRAM(S): 10 INJECTION, SOLUTION INTRAVENOUS at 09:23

## 2025-01-01 RX ADMIN — Medication 2 MILLIGRAM(S): at 10:13

## 2025-01-01 RX ADMIN — Medication 1 MILLIGRAM(S): at 11:59

## 2025-01-01 RX ADMIN — Medication 25 GRAM(S): at 11:28

## 2025-01-01 RX ADMIN — Medication 2.44 MICROGRAM(S)/KG/HR: at 02:14

## 2025-01-01 RX ADMIN — Medication 25 GRAM(S): at 05:07

## 2025-01-01 RX ADMIN — Medication 200 MILLIGRAM(S): at 11:32

## 2025-01-01 RX ADMIN — PROPOFOL 7.32 MICROGRAM(S)/KG/MIN: 10 INJECTION, EMULSION INTRAVENOUS at 03:03

## 2025-01-01 RX ADMIN — PROPOFOL 7.32 MICROGRAM(S)/KG/MIN: 10 INJECTION, EMULSION INTRAVENOUS at 11:54

## 2025-01-01 RX ADMIN — Medication 400 MILLIGRAM(S): at 09:15

## 2025-01-01 RX ADMIN — Medication 50 MILLIGRAM(S): at 11:58

## 2025-01-01 RX ADMIN — Medication 15 MILLILITER(S): at 17:27

## 2025-01-01 RX ADMIN — Medication 25 MICROGRAM(S): at 06:07

## 2025-01-01 RX ADMIN — Medication 25 GRAM(S): at 14:56

## 2025-01-01 RX ADMIN — Medication 250 MILLIGRAM(S): at 02:31

## 2025-01-01 RX ADMIN — LEVETIRACETAM 400 MILLIGRAM(S): 10 INJECTION, SOLUTION INTRAVENOUS at 10:09

## 2025-01-01 RX ADMIN — Medication 1 APPLICATION(S): at 05:03

## 2025-01-01 RX ADMIN — GLYCOPYRROLATE 0.4 MILLIGRAM(S): 0.2 INJECTION INTRAMUSCULAR; INTRAVENOUS at 11:58

## 2025-01-01 RX ADMIN — Medication 50 MILLIGRAM(S): at 23:35

## 2025-01-01 RX ADMIN — Medication 15 MILLILITER(S): at 17:11

## 2025-01-01 RX ADMIN — Medication 50 MILLIGRAM(S): at 23:33

## 2025-01-01 RX ADMIN — Medication 40 MILLIGRAM(S): at 12:30

## 2025-01-01 RX ADMIN — Medication 255 MILLIGRAM(S): at 05:22

## 2025-01-01 RX ADMIN — Medication 25 MICROGRAM(S): at 05:22

## 2025-01-01 RX ADMIN — ATORVASTATIN CALCIUM 10 MILLIGRAM(S): 80 TABLET, FILM COATED ORAL at 23:32

## 2025-01-01 RX ADMIN — Medication 25 GRAM(S): at 05:38

## 2025-01-01 RX ADMIN — LEVETIRACETAM 400 MILLIGRAM(S): 10 INJECTION, SOLUTION INTRAVENOUS at 22:32

## 2025-01-01 RX ADMIN — SODIUM CHLORIDE 100 MILLILITER(S): 9 INJECTION, SOLUTION INTRAVENOUS at 10:58

## 2025-01-01 RX ADMIN — LEVETIRACETAM 400 MILLIGRAM(S): 10 INJECTION, SOLUTION INTRAVENOUS at 10:23

## 2025-01-01 RX ADMIN — Medication 40 MILLIGRAM(S): at 11:27

## 2025-01-01 RX ADMIN — Medication 200 MILLIGRAM(S): at 12:23

## 2025-01-01 RX ADMIN — Medication 200 MILLIGRAM(S): at 11:26

## 2025-01-01 RX ADMIN — Medication 25 GRAM(S): at 22:32

## 2025-01-01 RX ADMIN — LEVETIRACETAM 400 MILLIGRAM(S): 10 INJECTION, SOLUTION INTRAVENOUS at 10:54

## 2025-01-01 RX ADMIN — Medication 1300 MILLILITER(S): at 00:55

## 2025-01-01 RX ADMIN — Medication 1000 MILLIGRAM(S): at 09:30

## 2025-01-01 RX ADMIN — HEPARIN SODIUM 5000 UNIT(S): 1000 INJECTION INTRAVENOUS; SUBCUTANEOUS at 05:07

## 2025-01-01 RX ADMIN — Medication 1 APPLICATION(S): at 05:08

## 2025-01-01 RX ADMIN — Medication 40 MILLIGRAM(S): at 11:32

## 2025-01-01 RX ADMIN — Medication 100 MILLIGRAM(S): at 08:40

## 2025-01-01 RX ADMIN — SODIUM CHLORIDE 100 MILLILITER(S): 9 INJECTION, SOLUTION INTRAVENOUS at 17:06

## 2025-01-01 RX ADMIN — Medication 2.44 MICROGRAM(S)/KG/HR: at 18:19

## 2025-01-01 RX ADMIN — Medication 15 MILLILITER(S): at 05:09

## 2025-01-01 RX ADMIN — Medication 0.5 MILLIGRAM(S): at 06:07

## 2025-01-01 RX ADMIN — NOREPINEPHRINE BITARTRATE 4.17 MICROGRAM(S)/KG/MIN: 8 SOLUTION at 17:29

## 2025-01-01 RX ADMIN — LEVETIRACETAM 400 MILLIGRAM(S): 10 INJECTION, SOLUTION INTRAVENOUS at 10:50

## 2025-01-01 RX ADMIN — Medication 40 MILLIGRAM(S): at 11:13

## 2025-01-01 RX ADMIN — Medication 50 MILLIGRAM(S): at 05:07

## 2025-01-01 RX ADMIN — Medication 50 MILLIGRAM(S): at 11:12

## 2025-01-01 RX ADMIN — Medication 200 MILLIGRAM(S): at 11:13

## 2025-01-01 RX ADMIN — Medication 50 MILLIGRAM(S): at 06:13

## 2025-01-01 RX ADMIN — Medication 50 MILLIGRAM(S): at 17:27

## 2025-01-01 RX ADMIN — Medication 50 MILLIGRAM(S): at 11:32

## 2025-01-01 RX ADMIN — Medication 25 MICROGRAM(S): at 05:07

## 2025-01-01 RX ADMIN — Medication 40 MILLIEQUIVALENT(S): at 06:42

## 2025-01-01 RX ADMIN — Medication 200 MILLIGRAM(S): at 12:31

## 2025-01-01 RX ADMIN — Medication 40 MILLIGRAM(S): at 12:24

## 2025-01-01 RX ADMIN — HEPARIN SODIUM 5000 UNIT(S): 1000 INJECTION INTRAVENOUS; SUBCUTANEOUS at 05:12

## 2025-01-01 RX ADMIN — LEVETIRACETAM 400 MILLIGRAM(S): 10 INJECTION, SOLUTION INTRAVENOUS at 00:04

## 2025-01-01 RX ADMIN — Medication 200 GRAM(S): at 07:42

## 2025-01-01 RX ADMIN — Medication 675 MILLIGRAM(S): at 01:15

## 2025-01-07 ENCOUNTER — INPATIENT (INPATIENT)
Facility: HOSPITAL | Age: 89
LOS: 0 days | Discharge: HOME HEALTH | End: 2025-01-08
Attending: SPECIALIST | Admitting: STUDENT IN AN ORGANIZED HEALTH CARE EDUCATION/TRAINING PROGRAM
Payer: MEDICARE

## 2025-01-07 VITALS — WEIGHT: 89.95 LBS | OXYGEN SATURATION: 95 % | HEART RATE: 130 BPM | RESPIRATION RATE: 20 BRPM

## 2025-01-07 DIAGNOSIS — A41.9 SEPSIS, UNSPECIFIED ORGANISM: ICD-10-CM

## 2025-01-07 DIAGNOSIS — E03.9 HYPOTHYROIDISM, UNSPECIFIED: ICD-10-CM

## 2025-01-07 DIAGNOSIS — L89.90 PRESSURE ULCER OF UNSPECIFIED SITE, UNSPECIFIED STAGE: ICD-10-CM

## 2025-01-07 DIAGNOSIS — Z93.1 GASTROSTOMY STATUS: ICD-10-CM

## 2025-01-07 DIAGNOSIS — Z29.9 ENCOUNTER FOR PROPHYLACTIC MEASURES, UNSPECIFIED: ICD-10-CM

## 2025-01-07 DIAGNOSIS — D62 ACUTE POSTHEMORRHAGIC ANEMIA: ICD-10-CM

## 2025-01-07 DIAGNOSIS — I10 ESSENTIAL (PRIMARY) HYPERTENSION: ICD-10-CM

## 2025-01-07 DIAGNOSIS — L89.210 PRESSURE ULCER OF RIGHT HIP, UNSTAGEABLE: ICD-10-CM

## 2025-01-07 DIAGNOSIS — E78.5 HYPERLIPIDEMIA, UNSPECIFIED: ICD-10-CM

## 2025-01-07 LAB
ADD ON TEST-SPECIMEN IN LAB: SIGNIFICANT CHANGE UP
ALBUMIN SERPL ELPH-MCNC: 1.8 G/DL — LOW (ref 3.3–5)
ALBUMIN SERPL ELPH-MCNC: 2.2 G/DL — LOW (ref 3.3–5)
ALP SERPL-CCNC: 73 U/L — SIGNIFICANT CHANGE UP (ref 40–120)
ALP SERPL-CCNC: 92 U/L — SIGNIFICANT CHANGE UP (ref 40–120)
ALT FLD-CCNC: 14 U/L — SIGNIFICANT CHANGE UP (ref 10–45)
ALT FLD-CCNC: 15 U/L — SIGNIFICANT CHANGE UP (ref 10–45)
ANION GAP SERPL CALC-SCNC: 13 MMOL/L — SIGNIFICANT CHANGE UP (ref 5–17)
ANION GAP SERPL CALC-SCNC: 18 MMOL/L — HIGH (ref 5–17)
ANION GAP SERPL CALC-SCNC: 9 MMOL/L — SIGNIFICANT CHANGE UP (ref 5–17)
APPEARANCE UR: CLEAR — SIGNIFICANT CHANGE UP
APTT BLD: 26.1 SEC — SIGNIFICANT CHANGE UP (ref 24.5–35.6)
APTT BLD: 26.2 SEC — SIGNIFICANT CHANGE UP (ref 24.5–35.6)
AST SERPL-CCNC: 20 U/L — SIGNIFICANT CHANGE UP (ref 10–40)
AST SERPL-CCNC: 23 U/L — SIGNIFICANT CHANGE UP (ref 10–40)
BASOPHILS # BLD AUTO: 0 K/UL — SIGNIFICANT CHANGE UP (ref 0–0.2)
BASOPHILS # BLD AUTO: 0.04 K/UL — SIGNIFICANT CHANGE UP (ref 0–0.2)
BASOPHILS # BLD AUTO: 0.2 K/UL — SIGNIFICANT CHANGE UP (ref 0–0.2)
BASOPHILS NFR BLD AUTO: 0 % — SIGNIFICANT CHANGE UP (ref 0–2)
BASOPHILS NFR BLD AUTO: 0.2 % — SIGNIFICANT CHANGE UP (ref 0–2)
BASOPHILS NFR BLD AUTO: 0.9 % — SIGNIFICANT CHANGE UP (ref 0–2)
BILIRUB SERPL-MCNC: 0.2 MG/DL — SIGNIFICANT CHANGE UP (ref 0.2–1.2)
BILIRUB SERPL-MCNC: 0.5 MG/DL — SIGNIFICANT CHANGE UP (ref 0.2–1.2)
BILIRUB UR-MCNC: NEGATIVE — SIGNIFICANT CHANGE UP
BLD GP AB SCN SERPL QL: NEGATIVE — SIGNIFICANT CHANGE UP
BLD GP AB SCN SERPL QL: NEGATIVE — SIGNIFICANT CHANGE UP
BUN SERPL-MCNC: 37 MG/DL — HIGH (ref 7–23)
BUN SERPL-MCNC: 44 MG/DL — HIGH (ref 7–23)
BUN SERPL-MCNC: 47 MG/DL — HIGH (ref 7–23)
C DIFF GDH STL QL: NEGATIVE — SIGNIFICANT CHANGE UP
C DIFF GDH STL QL: SIGNIFICANT CHANGE UP
CALCIUM SERPL-MCNC: 6.9 MG/DL — LOW (ref 8.4–10.5)
CALCIUM SERPL-MCNC: 7.8 MG/DL — LOW (ref 8.4–10.5)
CALCIUM SERPL-MCNC: 8.7 MG/DL — SIGNIFICANT CHANGE UP (ref 8.4–10.5)
CHLORIDE SERPL-SCNC: 102 MMOL/L — SIGNIFICANT CHANGE UP (ref 96–108)
CHLORIDE SERPL-SCNC: 104 MMOL/L — SIGNIFICANT CHANGE UP (ref 96–108)
CHLORIDE SERPL-SCNC: 96 MMOL/L — SIGNIFICANT CHANGE UP (ref 96–108)
CO2 SERPL-SCNC: 24 MMOL/L — SIGNIFICANT CHANGE UP (ref 22–31)
CO2 SERPL-SCNC: 25 MMOL/L — SIGNIFICANT CHANGE UP (ref 22–31)
CO2 SERPL-SCNC: 25 MMOL/L — SIGNIFICANT CHANGE UP (ref 22–31)
COLOR SPEC: YELLOW — SIGNIFICANT CHANGE UP
CREAT SERPL-MCNC: 0.54 MG/DL — SIGNIFICANT CHANGE UP (ref 0.5–1.3)
CREAT SERPL-MCNC: 0.7 MG/DL — SIGNIFICANT CHANGE UP (ref 0.5–1.3)
CREAT SERPL-MCNC: 0.72 MG/DL — SIGNIFICANT CHANGE UP (ref 0.5–1.3)
DIFF PNL FLD: ABNORMAL
EGFR: 80 ML/MIN/1.73M2 — SIGNIFICANT CHANGE UP
EGFR: 83 ML/MIN/1.73M2 — SIGNIFICANT CHANGE UP
EGFR: 88 ML/MIN/1.73M2 — SIGNIFICANT CHANGE UP
EOSINOPHIL # BLD AUTO: 0 K/UL — SIGNIFICANT CHANGE UP (ref 0–0.5)
EOSINOPHIL NFR BLD AUTO: 0 % — SIGNIFICANT CHANGE UP (ref 0–6)
GAS PNL BLDV: SIGNIFICANT CHANGE UP
GAS PNL BLDV: SIGNIFICANT CHANGE UP
GI PCR PANEL: SIGNIFICANT CHANGE UP
GLUCOSE SERPL-MCNC: 133 MG/DL — HIGH (ref 70–99)
GLUCOSE SERPL-MCNC: 194 MG/DL — HIGH (ref 70–99)
GLUCOSE SERPL-MCNC: 275 MG/DL — HIGH (ref 70–99)
GLUCOSE UR QL: NEGATIVE MG/DL — SIGNIFICANT CHANGE UP
HCT VFR BLD CALC: 16.5 % — CRITICAL LOW (ref 34.5–45)
HCT VFR BLD CALC: 22.5 % — LOW (ref 34.5–45)
HCT VFR BLD CALC: 28.5 % — LOW (ref 34.5–45)
HCT VFR BLD CALC: 28.7 % — LOW (ref 34.5–45)
HCT VFR BLD CALC: 28.8 % — LOW (ref 34.5–45)
HGB BLD-MCNC: 4.9 G/DL — CRITICAL LOW (ref 11.5–15.5)
HGB BLD-MCNC: 6.3 G/DL — CRITICAL LOW (ref 11.5–15.5)
HGB BLD-MCNC: 8.9 G/DL — LOW (ref 11.5–15.5)
HGB BLD-MCNC: 9.1 G/DL — LOW (ref 11.5–15.5)
HGB BLD-MCNC: 9.2 G/DL — LOW (ref 11.5–15.5)
IMM GRANULOCYTES NFR BLD AUTO: 0.8 % — SIGNIFICANT CHANGE UP (ref 0–0.9)
INR BLD: 0.99 — SIGNIFICANT CHANGE UP (ref 0.85–1.16)
INR BLD: 1.06 — SIGNIFICANT CHANGE UP (ref 0.85–1.16)
KETONES UR-MCNC: NEGATIVE MG/DL — SIGNIFICANT CHANGE UP
LACTATE SERPL-SCNC: 1 MMOL/L — SIGNIFICANT CHANGE UP (ref 0.5–2)
LACTATE SERPL-SCNC: 10.4 MMOL/L — CRITICAL HIGH (ref 0.5–2)
LACTATE SERPL-SCNC: 2.8 MMOL/L — HIGH (ref 0.5–2)
LACTATE SERPL-SCNC: 8 MMOL/L — CRITICAL HIGH (ref 0.5–2)
LEUKOCYTE ESTERASE UR-ACNC: NEGATIVE — SIGNIFICANT CHANGE UP
LYMPHOCYTES # BLD AUTO: 1.62 K/UL — SIGNIFICANT CHANGE UP (ref 1–3.3)
LYMPHOCYTES # BLD AUTO: 1.93 K/UL — SIGNIFICANT CHANGE UP (ref 1–3.3)
LYMPHOCYTES # BLD AUTO: 19.6 % — SIGNIFICANT CHANGE UP (ref 13–44)
LYMPHOCYTES # BLD AUTO: 4.26 K/UL — HIGH (ref 1–3.3)
LYMPHOCYTES # BLD AUTO: 7.6 % — LOW (ref 13–44)
LYMPHOCYTES # BLD AUTO: 8 % — LOW (ref 13–44)
MCHC RBC-ENTMCNC: 22.7 PG — LOW (ref 27–34)
MCHC RBC-ENTMCNC: 23.7 PG — LOW (ref 27–34)
MCHC RBC-ENTMCNC: 25.3 PG — LOW (ref 27–34)
MCHC RBC-ENTMCNC: 25.6 PG — LOW (ref 27–34)
MCHC RBC-ENTMCNC: 26.2 PG — LOW (ref 27–34)
MCHC RBC-ENTMCNC: 28 G/DL — LOW (ref 32–36)
MCHC RBC-ENTMCNC: 29.7 G/DL — LOW (ref 32–36)
MCHC RBC-ENTMCNC: 31.2 G/DL — LOW (ref 32–36)
MCHC RBC-ENTMCNC: 31.6 G/DL — LOW (ref 32–36)
MCHC RBC-ENTMCNC: 32.1 G/DL — SIGNIFICANT CHANGE UP (ref 32–36)
MCV RBC AUTO: 79.7 FL — LOW (ref 80–100)
MCV RBC AUTO: 80.2 FL — SIGNIFICANT CHANGE UP (ref 80–100)
MCV RBC AUTO: 80.9 FL — SIGNIFICANT CHANGE UP (ref 80–100)
MCV RBC AUTO: 81.8 FL — SIGNIFICANT CHANGE UP (ref 80–100)
MCV RBC AUTO: 82.1 FL — SIGNIFICANT CHANGE UP (ref 80–100)
MONOCYTES # BLD AUTO: 0.59 K/UL — SIGNIFICANT CHANGE UP (ref 0–0.9)
MONOCYTES # BLD AUTO: 1.35 K/UL — HIGH (ref 0–0.9)
MONOCYTES # BLD AUTO: 1.73 K/UL — HIGH (ref 0–0.9)
MONOCYTES NFR BLD AUTO: 2.7 % — SIGNIFICANT CHANGE UP (ref 2–14)
MONOCYTES NFR BLD AUTO: 6.4 % — SIGNIFICANT CHANGE UP (ref 2–14)
MONOCYTES NFR BLD AUTO: 7.2 % — SIGNIFICANT CHANGE UP (ref 2–14)
NEUTROPHILS # BLD AUTO: 16.48 K/UL — HIGH (ref 1.8–7.4)
NEUTROPHILS # BLD AUTO: 18.07 K/UL — HIGH (ref 1.8–7.4)
NEUTROPHILS # BLD AUTO: 20.42 K/UL — HIGH (ref 1.8–7.4)
NEUTROPHILS NFR BLD AUTO: 75.9 % — SIGNIFICANT CHANGE UP (ref 43–77)
NEUTROPHILS NFR BLD AUTO: 84.8 % — HIGH (ref 43–77)
NEUTROPHILS NFR BLD AUTO: 85 % — HIGH (ref 43–77)
NITRITE UR-MCNC: POSITIVE
NRBC # BLD: 0 /100 WBCS — SIGNIFICANT CHANGE UP (ref 0–0)
PH UR: 5.5 — SIGNIFICANT CHANGE UP (ref 5–8)
PLATELET # BLD AUTO: 240 K/UL — SIGNIFICANT CHANGE UP (ref 150–400)
PLATELET # BLD AUTO: 241 K/UL — SIGNIFICANT CHANGE UP (ref 150–400)
PLATELET # BLD AUTO: 272 K/UL — SIGNIFICANT CHANGE UP (ref 150–400)
PLATELET # BLD AUTO: 283 K/UL — SIGNIFICANT CHANGE UP (ref 150–400)
PLATELET # BLD AUTO: 482 K/UL — HIGH (ref 150–400)
POTASSIUM SERPL-MCNC: 4.1 MMOL/L — SIGNIFICANT CHANGE UP (ref 3.5–5.3)
POTASSIUM SERPL-MCNC: 5.6 MMOL/L — HIGH (ref 3.5–5.3)
POTASSIUM SERPL-MCNC: SIGNIFICANT CHANGE UP (ref 3.5–5.3)
POTASSIUM SERPL-SCNC: 4.1 MMOL/L — SIGNIFICANT CHANGE UP (ref 3.5–5.3)
POTASSIUM SERPL-SCNC: 5.6 MMOL/L — HIGH (ref 3.5–5.3)
POTASSIUM SERPL-SCNC: SIGNIFICANT CHANGE UP (ref 3.5–5.3)
PROT SERPL-MCNC: 4.5 G/DL — LOW (ref 6–8.3)
PROT SERPL-MCNC: 5.7 G/DL — LOW (ref 6–8.3)
PROT UR-MCNC: SIGNIFICANT CHANGE UP MG/DL
PROTHROM AB SERPL-ACNC: 11.6 SEC — SIGNIFICANT CHANGE UP (ref 9.9–13.4)
PROTHROM AB SERPL-ACNC: 12.4 SEC — SIGNIFICANT CHANGE UP (ref 9.9–13.4)
RBC # BLD: 2.07 M/UL — LOW (ref 3.8–5.2)
RBC # BLD: 2.78 M/UL — LOW (ref 3.8–5.2)
RBC # BLD: 3.47 M/UL — LOW (ref 3.8–5.2)
RBC # BLD: 3.51 M/UL — LOW (ref 3.8–5.2)
RBC # BLD: 3.59 M/UL — LOW (ref 3.8–5.2)
RBC # FLD: 16.3 % — HIGH (ref 10.3–14.5)
RBC # FLD: 16.6 % — HIGH (ref 10.3–14.5)
RBC # FLD: 17 % — HIGH (ref 10.3–14.5)
RBC # FLD: 17.2 % — HIGH (ref 10.3–14.5)
RBC # FLD: 17.2 % — HIGH (ref 10.3–14.5)
RH IG SCN BLD-IMP: POSITIVE — SIGNIFICANT CHANGE UP
SODIUM SERPL-SCNC: 138 MMOL/L — SIGNIFICANT CHANGE UP (ref 135–145)
SODIUM SERPL-SCNC: 138 MMOL/L — SIGNIFICANT CHANGE UP (ref 135–145)
SODIUM SERPL-SCNC: 140 MMOL/L — SIGNIFICANT CHANGE UP (ref 135–145)
SP GR SPEC: 1.03 — SIGNIFICANT CHANGE UP (ref 1–1.03)
UROBILINOGEN FLD QL: 0.2 MG/DL — SIGNIFICANT CHANGE UP (ref 0.2–1)
WBC # BLD: 17.44 K/UL — HIGH (ref 3.8–10.5)
WBC # BLD: 21.24 K/UL — HIGH (ref 3.8–10.5)
WBC # BLD: 21.71 K/UL — HIGH (ref 3.8–10.5)
WBC # BLD: 24.08 K/UL — HIGH (ref 3.8–10.5)
WBC # BLD: 30.45 K/UL — HIGH (ref 3.8–10.5)
WBC # FLD AUTO: 17.44 K/UL — HIGH (ref 3.8–10.5)
WBC # FLD AUTO: 21.24 K/UL — HIGH (ref 3.8–10.5)
WBC # FLD AUTO: 21.71 K/UL — HIGH (ref 3.8–10.5)
WBC # FLD AUTO: 24.08 K/UL — HIGH (ref 3.8–10.5)
WBC # FLD AUTO: 30.45 K/UL — HIGH (ref 3.8–10.5)

## 2025-01-07 PROCEDURE — 99291 CRITICAL CARE FIRST HOUR: CPT

## 2025-01-07 PROCEDURE — 71045 X-RAY EXAM CHEST 1 VIEW: CPT | Mod: 26

## 2025-01-07 PROCEDURE — 74177 CT ABD & PELVIS W/CONTRAST: CPT | Mod: 26,MC

## 2025-01-07 PROCEDURE — 93010 ELECTROCARDIOGRAM REPORT: CPT | Mod: 76

## 2025-01-07 PROCEDURE — 99222 1ST HOSP IP/OBS MODERATE 55: CPT | Mod: GC

## 2025-01-07 RX ORDER — ATORVASTATIN CALCIUM 40 MG/1
10 TABLET, FILM COATED ORAL AT BEDTIME
Refills: 0 | Status: DISCONTINUED | OUTPATIENT
Start: 2025-01-07 | End: 2025-01-08

## 2025-01-07 RX ORDER — VANCOMYCIN HYDROCHLORIDE 5 G/100ML
500 INJECTION, POWDER, LYOPHILIZED, FOR SOLUTION INTRAVENOUS ONCE
Refills: 0 | Status: COMPLETED | OUTPATIENT
Start: 2025-01-07 | End: 2025-01-07

## 2025-01-07 RX ORDER — PIPERACILLIN AND TAZOBACTAM 3; .375 G/15ML; G/15ML
3.38 INJECTION, POWDER, LYOPHILIZED, FOR SOLUTION INTRAVENOUS ONCE
Refills: 0 | Status: COMPLETED | OUTPATIENT
Start: 2025-01-07 | End: 2025-01-07

## 2025-01-07 RX ORDER — SODIUM CHLORIDE 9 MG/ML
1000 INJECTION, SOLUTION INTRAMUSCULAR; INTRAVENOUS; SUBCUTANEOUS ONCE
Refills: 0 | Status: COMPLETED | OUTPATIENT
Start: 2025-01-07 | End: 2025-01-07

## 2025-01-07 RX ORDER — LEVOTHYROXINE SODIUM 175 UG/1
25 TABLET ORAL DAILY
Refills: 0 | Status: DISCONTINUED | OUTPATIENT
Start: 2025-01-08 | End: 2025-01-08

## 2025-01-07 RX ORDER — OXYCODONE AND ACETAMINOPHEN 5; 325 MG/1; MG/1
1 TABLET ORAL
Qty: 9 | Refills: 0
Start: 2025-01-07 | End: 2025-01-09

## 2025-01-07 RX ORDER — TRAZODONE HYDROCHLORIDE 150 MG/1
50 TABLET ORAL EVERY 24 HOURS
Refills: 0 | Status: DISCONTINUED | OUTPATIENT
Start: 2025-01-07 | End: 2025-01-08

## 2025-01-07 RX ORDER — PIPERACILLIN AND TAZOBACTAM 3; .375 G/15ML; G/15ML
3.38 INJECTION, POWDER, LYOPHILIZED, FOR SOLUTION INTRAVENOUS EVERY 8 HOURS
Refills: 0 | Status: DISCONTINUED | OUTPATIENT
Start: 2025-01-07 | End: 2025-01-08

## 2025-01-07 RX ORDER — TAMSULOSIN HYDROCHLORIDE 0.4 MG/1
1 CAPSULE ORAL
Qty: 1 | Refills: 0
Start: 2025-01-07 | End: 2025-01-20

## 2025-01-07 RX ORDER — VANCOMYCIN HYDROCHLORIDE 5 G/100ML
1000 INJECTION, POWDER, LYOPHILIZED, FOR SOLUTION INTRAVENOUS ONCE
Refills: 0 | Status: DISCONTINUED | OUTPATIENT
Start: 2025-01-07 | End: 2025-01-07

## 2025-01-07 RX ORDER — ACETAMINOPHEN 80 MG/.8ML
600 SOLUTION/ DROPS ORAL ONCE
Refills: 0 | Status: DISCONTINUED | OUTPATIENT
Start: 2025-01-07 | End: 2025-01-08

## 2025-01-07 RX ORDER — DONEPEZIL HYDROCHLORIDE 5 MG/1
10 TABLET, FILM COATED ORAL AT BEDTIME
Refills: 0 | Status: DISCONTINUED | OUTPATIENT
Start: 2025-01-07 | End: 2025-01-08

## 2025-01-07 RX ORDER — MEMANTINE HYDROCHLORIDE 14 MG/1
10 CAPSULE, EXTENDED RELEASE ORAL EVERY 24 HOURS
Refills: 0 | Status: DISCONTINUED | OUTPATIENT
Start: 2025-01-07 | End: 2025-01-08

## 2025-01-07 RX ORDER — QUETIAPINE FUMARATE 100 MG/1
25 TABLET, FILM COATED ORAL EVERY 24 HOURS
Refills: 0 | Status: DISCONTINUED | OUTPATIENT
Start: 2025-01-07 | End: 2025-01-08

## 2025-01-07 RX ORDER — SODIUM CHLORIDE 9 MG/ML
1000 INJECTION, SOLUTION INTRAMUSCULAR; INTRAVENOUS; SUBCUTANEOUS
Refills: 0 | Status: DISCONTINUED | OUTPATIENT
Start: 2025-01-07 | End: 2025-01-08

## 2025-01-07 RX ORDER — VANCOMYCIN HYDROCHLORIDE 5 G/100ML
500 INJECTION, POWDER, LYOPHILIZED, FOR SOLUTION INTRAVENOUS EVERY 24 HOURS
Refills: 0 | Status: DISCONTINUED | OUTPATIENT
Start: 2025-01-08 | End: 2025-01-08

## 2025-01-07 RX ADMIN — SODIUM CHLORIDE 1000 MILLILITER(S): 9 INJECTION, SOLUTION INTRAMUSCULAR; INTRAVENOUS; SUBCUTANEOUS at 08:29

## 2025-01-07 RX ADMIN — MEMANTINE HYDROCHLORIDE 10 MILLIGRAM(S): 14 CAPSULE, EXTENDED RELEASE ORAL at 23:38

## 2025-01-07 RX ADMIN — SODIUM CHLORIDE 150 MILLILITER(S): 9 INJECTION, SOLUTION INTRAMUSCULAR; INTRAVENOUS; SUBCUTANEOUS at 13:25

## 2025-01-07 RX ADMIN — SODIUM CHLORIDE 1000 MILLILITER(S): 9 INJECTION, SOLUTION INTRAMUSCULAR; INTRAVENOUS; SUBCUTANEOUS at 09:45

## 2025-01-07 RX ADMIN — PIPERACILLIN AND TAZOBACTAM 200 GRAM(S): 3; .375 INJECTION, POWDER, LYOPHILIZED, FOR SOLUTION INTRAVENOUS at 09:45

## 2025-01-07 RX ADMIN — QUETIAPINE FUMARATE 25 MILLIGRAM(S): 100 TABLET, FILM COATED ORAL at 23:37

## 2025-01-07 RX ADMIN — TRAZODONE HYDROCHLORIDE 50 MILLIGRAM(S): 150 TABLET ORAL at 23:38

## 2025-01-07 RX ADMIN — VANCOMYCIN HYDROCHLORIDE 100 MILLIGRAM(S): 5 INJECTION, POWDER, LYOPHILIZED, FOR SOLUTION INTRAVENOUS at 11:07

## 2025-01-07 RX ADMIN — DONEPEZIL HYDROCHLORIDE 10 MILLIGRAM(S): 5 TABLET, FILM COATED ORAL at 23:37

## 2025-01-07 NOTE — CONSULT NOTE ADULT - ASSESSMENT
89F PMH dementia (axo at baseline), HTN, HLD, hypothyroidism, s/p EGD PEG 2/2 dysphagia, acute metabolic encephalopathy (medicine 8/6/24-8/9/24), PSH of DEAN mauricio (with Dr. Restrepo, JUly 2019), presenting for bleeding right hip pressure wound after debridement with wound care at home the day prior.     Recs:  - f/u GI PCR  - hemostasis achieved at beside with 2 vicryl stitches and Surgicel  - rec medicine admission  - NTD for sacral wound, no sign of infection  - no need for surgical intervention  - keep dressing on, surgery team will change dressing in the morning    Team 4c will continue to follow.  Case discussed with chief resident and attending physician.

## 2025-01-07 NOTE — PATIENT PROFILE ADULT - CENTRAL VENOUS CATHETER/PICC LINE
Progress Note - Text


Progress Note Date: 10/23/20


 Jami is a 78 -year-old white female status post evacuation of hematoma from 

right breast 9-8-20.  She is doing well with no evidence of infection.  Cultures

did not reveal any organisms of concern.


She has persistent opening with approximately a 22mm deep by 33mm cavity, length

32mm .  She continues to have this packed.  On today's examination there is some

firmness in the medial aspect of the area where this was drained.





Physical exam:


Lungs: Clear


Heart: Regular rate and rhythm


Incision packing removed and changed no evidence of infection


Cavity 22 mm deep by 33 mm lateral and 32 mm superior to inferior


There is an area of firmness and fullness in the medial aspect of the breast att

empt at aspiration using an 18-gauge needle on a 20 mL syringe did not reveal 

any fluid





Impression:


Patient doing well status post evacuation of hematoma right breast


Plan:


1.  Continue present packing


2.  Follow up one month


3.  She will be traveling back to [place] with her daughter-in-law at this time 

and I will see her again in 1 month no

## 2025-01-07 NOTE — H&P ADULT - PROBLEM SELECTOR PLAN 3
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 Chronic in nature and managed by home health wound care services who initially debrided right hip wound prior to admission. Now s/p closure and covered with appropriate dressing. Wound cultures collected.  -f/u surgery recs  -wound care consult  -wound check with dressing changes

## 2025-01-07 NOTE — ED PROVIDER NOTE - IV ALTEPLASE DOOR HIDDEN
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  Demonstrates WFL neck mobil show

## 2025-01-07 NOTE — H&P ADULT - PROBLEM SELECTOR PLAN 5
Patient is AAOx0 and noncommunicative per patient and HHA. Continue with home meds through PEG.   -trazodone 50 mg daily  -donepezil 10 mg daily  -seroquel 25 mg daily  -memantine 20 mg daily

## 2025-01-07 NOTE — ED PROVIDER NOTE - CLINICAL SUMMARY MEDICAL DECISION MAKING FREE TEXT BOX
Pt with sig blood loss from arterial bleeding from R hip wound.  Hemorrhagic shock improved and pt stabilized.  Surgery sutured.  SP 2 units PRBCs.  Lactates improved.  MICU consulted and declined - will admit medicine regional for ongoing eval of lactate, wbc ct.  CT neg for acute process.

## 2025-01-07 NOTE — ED PROVIDER NOTE - PATIENT PORTAL LINK FT
You can access the FollowMyHealth Patient Portal offered by [facility] by registering at the following website: [email or web address]. By joining [employer]’s FollowMyHealth portal, you will also be able to view your health information using other applications (apps) compatible with our system.

## 2025-01-07 NOTE — ED ADULT TRIAGE NOTE - NS ED TRIAGE CLINICAL UPGRADE PROVIDER FT
Patient well known to me.  Planned T11-12 lami, tumor resection.  However, patient called that his condition is worsening.  Admitted for IV steroids preop.
Dr Katz

## 2025-01-07 NOTE — ED PROVIDER NOTE - CARE PLAN
1 Principal Discharge DX:	Right ureteral stone   Principal Discharge DX:	Postoperative hemorrhagic shock

## 2025-01-07 NOTE — H&P ADULT - PROBLEM SELECTOR PLAN 2
Hgb 6.3 on admission , MCV 82.1. 2/2 to bleeding right hip pressure wound. Dropped to 4.9 but improved s/p wound closure, pressure, and 2u pRBC.   - trend CBC  - maintain active T&S  - transfuse if Hgb <7

## 2025-01-07 NOTE — ED PROVIDER NOTE - OBJECTIVE STATEMENT
Yes
89 yo F with hx of advanced dementia, bedbound, s/p PEG, hx of HTN, HLD and hypothyroidism now with persistent bleeding from R hip stasis ulcer that was debrided by home wound care agency yesterday while patient was at home which was complicated by bleeding.  No change to mental status.  According to family patient is at basline, including baseline tachycardia.  Also takes digoxin at home for ? Afib.  Only blood thinner is aspirin.

## 2025-01-07 NOTE — PROVIDER CONTACT NOTE (CRITICAL VALUE NOTIFICATION) - NAME OF MD/NP/PA/DO NOTIFIED:
MD Katz
[facility]/ICU
MD GORDILLO
MD GORDILLO
[facility]/ ICU

## 2025-01-07 NOTE — ED ADULT TRIAGE NOTE - CHIEF COMPLAINT QUOTE
Pt c/o right hip wound bleeding. It is a DTI. Stefano at triage states wound was debrided yesterday.

## 2025-01-07 NOTE — ED ADULT NURSE NOTE - NSFALLRISKINTERV_ED_ALL_ED
Assistance OOB with selected safe patient handling equipment if applicable/Assistance with ambulation/Communicate fall risk and risk factors to all staff, patient, and family/Monitor gait and stability/Monitor for mental status changes and reorient to person, place, and time, as needed/Provide visual cue: yellow wristband, yellow gown, etc/Reinforce activity limits and safety measures with patient and family/Toileting schedule using arm’s reach rule for commode and bathroom/Use of alarms - bed, stretcher, chair and/or video monitoring/Call bell, personal items and telephone in reach/Instruct patient to call for assistance before getting out of bed/chair/stretcher/Non-slip footwear applied when patient is off stretcher/[place] to call system/Physically safe environment - no spills, clutter or unnecessary equipment/Purposeful Proactive Rounding/Room/bathroom lighting operational, light cord in reach

## 2025-01-07 NOTE — ED ADULT NURSE NOTE - OBJECTIVE STATEMENT
Pt is 89 year old female c/o bleeding on wound from right side of hip; covered with gauze but actively bleeding since yesterday; pt is non verbal appears pale and diaphoretic; taken to bed and upgraded with MD Tyson ; pt does not appear in pain

## 2025-01-07 NOTE — CONSULT NOTE ADULT - ASSESSMENT
88yo female with PMHx of dementia (A&Ox0 at baseline), HTN, HLD, hypothyroidism presents for possible PEG placement tomorrow iso dysphagia and FTT. ICU consulted for severe hypernatremia.     NEUROLOGY  #?Metabolic encephalopathy  According to family and HHA, patient at baseline now A&Ox0, non-communicative. May have component of altered mentation from hypernatremia vs hypothyroidism vs infection. No concern for sepsis at this time. No concern for toxic ingestion.  - Hypernatremia txt as below  - Check TSH, Syphilis, Ammonia  - Obtain further collateral    CARDIOVASCULAR  #HTN  #HLD  - Med rec    PULMONARY  #[facility]    GASTROINTESTINAL  #Dysphagia 2/2 dementia  #Failure to thrive  #Severe protein-calorie malnutrition  - NPO  - Nutrition consult  - GI consult for PEG placement    RENAL  #Hypernatremia  Likely 2/2 weeks of poor PO intake. Pt with 2-3L FWD. S/p 500cc NS x2 bolus.  - Obtain repeat BMP  - Goal Na is 159-161 in 24 hours  - Monitor UOP  - Bladder scans q6h  - F/u urine studies    INFECTIOUS DISEASE  #R/o infection  Patient was given 7 day course of Levaquin for lethargy with improvement. No source was identified. Not meeting SIRS criteria on admission.   - Monitor off abx for now  - Obtain collateral    ENDOCRINE  #Hypothyroidism  Takes Levothyroxine 25mcg qd at home.   - Check TSH  - Start Levothyroxine 17.7mcg IVP qhs  - Monitor FSGs q6h  - mISS q6h  - F/u A1c    HEME  #Microcytic anemia  Hbg 8.4, MCV 75. Unknown baseline. No sources of bleeding on exam.  - Transfuse for Hgb <7  - Maintain active T&S  - F/u iron studies & TSH  - Obtain collateral for baseline Hgb    PREVENTION:  F: S/p NS 500cc bolus x2  E: Monitor, Replete to K>4 and Mg>2  N: NPO pending PEG placement  DVT ppx: heparin subq  GI ppx: NI  CODE STATUS: FULL    DISPO: 7[facility]  Discussed with ICU attending Dr. Goodson.    The patient is a 89yo female with PMHx of dementia (A&Ox0 at baseline), HTN, HLD, hypothyroidism, PEG Tube iso dysphagia who presents due to bleeding of right hip pressure wound after debridement with wound care at home 1 day ago, now s/p R hip hematoma debridement. ICU consulted for severe anemia (Hb 6.4>4.9) and severe sepsis (T<96.8F, HR<90, WBC>12, lactic acidosis) of unknown source.     NEUROLOGY  #Dementia  According to family and HHA, patient at baseline now A&Ox0, non-communicative.  Home Medications: Trazodone 50mg QD, Donepazil 10mg QD, Seroquel 25mg QD, Memantine 10mg QD  - c/w home meds via PEG      CARDIOVASCULAR  #Shock  #Tachycardia  #Severe Anemia  Suspect hemorrhagic shock (iso R hip bleeding pressure wound, now s/p debridement via Surgery) vs. Septic shock (unclear source, possible GI given recent loose stools vs. sacral pressure wound given suspected purulence)  Tachycardia, likely 2/2 volume depletion. EKG with Sinus tachycardia - Improving  Now s/p 2pRBC + 2L NS Saline  - Repeat CBC post-transfusion  - monitor tachycardia  - sepsis work-up as noted under 'ID'    #HTN  Not on any medications at home.   Hypotensive 115/53, suspect iso wound bleeding.  - Continue to monitor BP    #HLD  Home Medication: Lipitor 10mg QD  - c/w home med via PEG      PULMONARY  #[facility]      GASTROINTESTINAL  #Dysphagia 2/2 dementia  #Failure to thrive  #Severe protein-calorie malnutrition  - NPO  - Nutrition consult  - GI consult for PEG placement      RENAL  #Hypernatremia  Likely 2/2 weeks of poor PO intake. Pt with 2-3L FWD. S/p 500cc NS x2 bolus.  - Obtain repeat BMP  - Goal Na is 159-161 in 24 hours  - Monitor UOP  - Bladder scans q6h  - F/u urine studies      INFECTIOUS DISEASE  #R/o infection  Patient was given 7 day course of Levaquin for lethargy with improvement. No source was identified. Not meeting SIRS criteria on admission.   - Monitor off abx for now  - Obtain collateral      ENDOCRINE  #Hypothyroidism  Takes Levothyroxine 25mcg qd at home.   - Check TSH  - Start home Levothyroxine 25mcg qd via PEG      HEME  #Microcytic anemia  Hbg 8.4, MCV 75. Unknown baseline.   Prior Iron panel c/w DAYSI (low total iron 17, % saturation 8, TIBC wnl 224)   - Transfuse for Hgb <7   - Maintain active T&S      PREVENTION:  F: S/p 2L NS bolus x2  E: Monitor, Replete to K>4 and Mg>2  N: PEG tube feeds when appropriate  DVT ppx: Hold iso Hemorrhagic shock  GI ppx: NI  CODE STATUS: FULL    DISPO: 7[facility]  Discussed with ICU attending.   The patient is a 89yo female with PMHx of dementia (A&Ox0 at baseline), HTN, HLD, hypothyroidism, PEG Tube iso dysphagia who presents due to bleeding of right hip pressure wound after debridement with wound care at home 1 day ago, now s/p R hip hematoma debridement. ICU consulted for severe anemia (Hb 6.4>4.9) and severe sepsis (T<96.8F, HR<90, WBC>12, lactic acidosis) of unknown source.     NEUROLOGY  #Dementia  According to family and HHA, patient at baseline now A&Ox0, non-communicative.  Home Medications: Trazodone 50mg QD, Donepazil 10mg QD, Seroquel 25mg QD, Memantine 10mg QD  - c/w home meds via PEG      CARDIOVASCULAR  #Shock  #Tachycardia  #Severe Anemia  Suspect hemorrhagic shock (iso R hip bleeding pressure wound, now s/p debridement via Surgery) vs. Septic shock (unclear source, possible GI given recent loose stools vs. sacral pressure wound given suspected purulence)  Tachycardia, likely 2/2 volume depletion. EKG with Sinus tachycardia - Improving  Now s/p 2pRBC + 2L NS Saline  - Repeat CBC post-transfusion  - monitor tachycardia  - sepsis work-up as noted under 'ID'    #HTN  Not on any medications at home.   Hypotensive 115/53, suspect iso wound bleeding.  - Continue to monitor BP    #HLD  Home Medication: Lipitor 10mg QD  - c/w home med via PEG      PULMONARY  #[facility]  CXR unremarkable, no signs of consolidation/infection.      GASTROINTESTINAL  #Dysphagia 2/2 dementia  #Failure to thrive  - Nutrition consult  - restart tube feeds when indicated  - Aspiration Precautions      RENAL  #Hyperkalemia  Likely iso hemolysis  no EKG changes noted.  - Obtain repeat BMP  - Monitor UOP  - Bladder scans q6h      INFECTIOUS DISEASE  #Severe Sepsis  Unclear source of infection, possible GI given recent loose stools vs. sacral pressure wound given suspected purulence.  CXR unremarkable  -Vancomycin and Zosyn  -CT A/P  -GI PCR  -UA   -f/u blood cx x2  -f/u wound cx  -Trend lactate      ENDOCRINE  #Hypothyroidism  Takes Levothyroxine 25mcg qd at home.   - Check TSH  - c/w home Levothyroxine 25mcg qd via PEG      HEME  #Microcytic anemia  Hbg 6.3>4.5, MCV 79-80. With history of microcytic anemia. Baseline Hb high 7-8 range.  Prior Iron panel c/w DAYSI (low total iron 17, % saturation 8, TIBC wnl 224)   Currently suspect iso R hip bleeding pressure wound, now s/p debridement via Surgery.   - s/p 2pRBC, f/u post-transfusion CBC  - Transfuse for Hgb <7   - Maintain active T&S      PREVENTION:  F: S/p 2L NS bolus x2  E: Monitor, Replete to K>4 and Mg>2  N: PEG tube feeds when appropriate  DVT ppx: Hold iso Hemorrhagic shock  GI ppx: NI  CODE STATUS: FULL    DISPO: 7[facility]  Discussed with ICU attending.   The patient is a 89yo female with PMHx of dementia (A&Ox0 at baseline), HTN, HLD, hypothyroidism, PEG Tube iso dysphagia who presents due to bleeding of right hip pressure wound after debridement with wound care at home 1 day ago, now s/p R hip hematoma debridement. ICU consulted for severe anemia (Hb 6.4>4.9) and severe sepsis (T<96.8F, HR<90, WBC>12, lactic acidosis) of unknown source.     NEUROLOGY  #Dementia  According to family and HHA, patient at baseline now A&Ox0, non-communicative.  Home Medications: Trazodone 50mg QD, Donepazil 10mg QD, Seroquel 25mg QD, Memantine 10mg QD  - c/w home meds via PEG      CARDIOVASCULAR  #Shock  #Tachycardia  #Severe Anemia  Suspect hemorrhagic shock (iso R hip bleeding pressure wound, now s/p debridement via Surgery) vs. Septic shock (unclear source, possible GI given recent loose stools vs. sacral pressure wound given suspected purulence)  Tachycardia, likely 2/2 volume depletion. EKG with Sinus tachycardia - Improving  Now s/p 2pRBC + 2L NS Saline  - Repeat CBC post-transfusion  - monitor tachycardia  - sepsis work-up as noted under 'ID'    #HTN  Not on any medications at home.   Hypotensive 115/53, suspect iso wound bleeding.  - Continue to monitor BP    #HLD  Home Medication: Lipitor 10mg QD  - c/w home Lipitor via PEG      PULMONARY  #[facility]  CXR unremarkable, no signs of consolidation/infection.      GASTROINTESTINAL  #Dysphagia 2/2 dementia  #Failure to thrive  - Nutrition consult  - restart tube feeds when indicated  - Aspiration Precautions      RENAL  #Hyperkalemia  Likely iso hemolysis  no EKG changes noted.  - Obtain repeat BMP  - Monitor UOP  - Bladder scans q6h      INFECTIOUS DISEASE  #Severe Sepsis  Meets criteria given T<96.8F, HR<90, WBC>12, and lactic acidosis.  Unclear source of infection, possible GI given recent loose stools vs. sacral pressure wound given suspected purulence noted on PE.  CXR unremarkable  No fevers noted at home per HHA.  -Vancomycin and Zosyn  -CT A/P  -GI PCR  -UA   -f/u blood cx x2  -f/u wound cx  -Trend lactate      ENDOCRINE  #Hypothyroidism  Takes Levothyroxine 25mcg QD at home.   - Check TSH  - c/w home Levothyroxine 25mcg QD via PEG      HEME  #Microcytic anemia  Hbg 6.3>4.5, MCV 79-80. With history of microcytic anemia. Baseline Hb high 7-8 range.  Prior Iron panel c/w DAYSI (low total iron 17, % saturation 8, TIBC wnl 224)   Currently suspect iso R hip bleeding pressure wound, now s/p debridement via Surgery.   No melena or hematemesis noted at home per HHA at bedside.  - s/p 2pRBC, f/u post-transfusion CBC  - Transfuse for Hgb <7   - Maintain active T&S      PREVENTION:  F: S/p 2L NS bolus x2  E: Monitor, Replete to K>4 and Mg>2  N: PEG tube feeds when appropriate  DVT ppx: Hold iso Hemorrhagic shock  GI ppx: NI  CODE STATUS: FULL    DISPO: 7[facility]  Discussed with ICU attending.   The patient is a 89yo female with PMHx of dementia (A&Ox0 at baseline), HTN, HLD, hypothyroidism, PEG Tube iso dysphagia who presents due to bleeding of right hip pressure wound after debridement with wound care at home 1 day ago, now s/p R hip hematoma debridement. ICU consulted for severe anemia (Hb 6.4>4.9) and severe sepsis (T<96.8F, HR<90, WBC>12, lactic acidosis) of unknown source.     NEUROLOGY  #Dementia  According to family and HHA, patient at baseline now A&Ox0, non-communicative.  Home Medications: Trazodone 50mg QD, Donepazil 10mg QD, Seroquel 25mg QD, Memantine 10mg QD  - c/w home meds via PEG      CARDIOVASCULAR  #Shock  #Tachycardia  #Severe Anemia  Suspect hemorrhagic shock (iso R hip bleeding pressure wound, now s/p debridement via Surgery) vs. Septic shock (unclear source, possible GI given recent loose stools vs. sacral pressure wound given suspected purulence)  Tachycardia, likely 2/2 volume depletion. EKG with Sinus tachycardia - Improving  Now s/p 2pRBC + 2L NS Saline  - Repeat CBC post-transfusion  - monitor tachycardia  - sepsis work-up as noted under 'ID'    #HTN  Not on any medications at home.   Hypotensive 115/53, suspect iso wound bleeding.  - Continue to monitor BP    #HLD  Home Medication: Lipitor 10mg QD  - c/w home Lipitor via PEG      PULMONARY  #[facility]  CXR unremarkable, no signs of consolidation/infection.      GASTROINTESTINAL  #Dysphagia 2/2 dementia  #Failure to thrive  - Nutrition consult  - restart tube feeds when indicated  - Aspiration Precautions      RENAL  #Hyperkalemia  Likely iso hemolysis  no EKG changes noted.  - Obtain repeat BMP  - Monitor UOP  - Bladder scans q6h      INFECTIOUS DISEASE  #Severe Sepsis  Meets criteria given T<96.8F, HR<90, WBC>12, and lactic acidosis.  Unclear source of infection, possible GI given recent loose stools vs. sacral pressure wound given suspected purulence noted on PE.  CXR unremarkable  No fevers noted at home per HHA.  -Vancomycin and Zosyn  -CT A/P  -GI PCR  -UA   -f/u blood cx x2  -f/u wound cx  -Trend lactate      MSK  #Pressure Wounds  Sacral ulcer and R hip wound noted, per HHA and daughter at bedside present for the last 3-4 months.  Now s/p debridement of bleeding R hip wound.  - Wound cultures  - Wound care consult   - Surgery recs for R hip      ENDOCRINE  #Hypothyroidism  Takes Levothyroxine 25mcg QD at home.   - Check TSH  - c/w home Levothyroxine 25mcg QD via PEG      HEME  #Microcytic anemia  Hbg 6.3>4.5, MCV 79-80. With history of microcytic anemia. Baseline Hb high 7-8 range.  Prior Iron panel c/w DAYSI (low total iron 17, % saturation 8, TIBC wnl 224)   Currently suspect iso R hip bleeding pressure wound, now s/p debridement via Surgery.   No melena or hematemesis noted at home per HHA at bedside.  - s/p 2pRBC, f/u post-transfusion CBC  - Transfuse for Hgb <7   - Maintain active T&S      PREVENTION:  F: S/p 2L NS bolus x2  E: Monitor, Replete to K>4 and Mg>2  N: PEG tube feeds when appropriate  DVT ppx: Hold iso Hemorrhagic shock  GI ppx: NI  CODE STATUS: FULL    DISPO: 7[facility]  Discussed with ICU attending.   The patient is a 88yo female with PMHx of dementia (A&Ox0 at baseline), HTN, HLD, hypothyroidism, PEG Tube iso dysphagia who presents due to bleeding of right hip pressure wound after debridement with wound care at home 1 day ago, now s/p R hip hematoma debridement. ICU consulted for severe anemia (Hb 6.4>4.9) and severe sepsis (T<96.8F, HR<90, WBC>12, lactic acidosis) of unknown source.     NEUROLOGY  #Dementia  According to family and HHA, patient at baseline now A&Ox0, non-communicative.  Home Medications: Trazodone 50mg QD, Donepazil 10mg QD, Seroquel 25mg QD, Memantine 10mg QD  - c/w home meds via PEG      CARDIOVASCULAR  #Shock  #Tachycardia  #Severe Anemia  Suspect hemorrhagic shock (iso R hip bleeding pressure wound, now s/p debridement via Surgery) vs. Septic shock (unclear source, possible GI given recent loose stools vs. sacral pressure wound given suspected purulence)  Tachycardia, likely 2/2 volume depletion. EKG with Sinus tachycardia - Improving  Now s/p 2pRBC + 2L NS Saline  - Repeat CBC post-transfusion  - monitor tachycardia  - sepsis work-up as noted under 'ID'    #HTN  Not on any medications at home.   Hypotensive 115/53, suspect iso wound bleeding.  - Continue to monitor BP    #HLD  Home Medication: Lipitor 10mg QD  - c/w home Lipitor via PEG      PULMONARY  #[facility]  CXR unremarkable, no signs of consolidation/infection.      GASTROINTESTINAL  #Dysphagia 2/2 dementia  #Failure to thrive  - Nutrition consult   - restart tube feeds when indicated (per last admission recs: Jevity 1.2 with goal rate of 47 ml/hr with LPS x1/day from 2852-1963 to provide 846 ml tube feed, 1115 calories, 62 g Prot., and 683 ml free water)  - Aspiration Precautions      RENAL  #Hyperkalemia  Likely iso hemolysis  no EKG changes noted.  - Obtain repeat BMP  - Monitor UOP  - Bladder scans q6h      INFECTIOUS DISEASE  #Severe Sepsis  Meets criteria given T<96.8F, HR<90, WBC>12, and lactic acidosis.  Unclear source of infection, possible GI given recent loose stools vs. sacral pressure wound given suspected purulence noted on PE.  CXR unremarkable  No fevers noted at home per HHA.  -Vancomycin and Zosyn  -CT A/P  -GI PCR  -UA   -f/u blood cx x2  -f/u wound cx  -Trend lactate      MSK  #Pressure Wounds  Sacral ulcer and R hip wound noted, per HHA and daughter at bedside present for the last 3-4 months.  Now s/p debridement of bleeding R hip wound.  - Wound cultures  - Wound care consult   - Surgery recs for R hip  - Frequent wound checks      ENDOCRINE  #Hypothyroidism  Takes Levothyroxine 25mcg QD at home.   - Check TSH  - c/w home Levothyroxine 25mcg QD via PEG      HEME  #Microcytic anemia  Hbg 6.3>4.5, MCV 79-80. With history of microcytic anemia. Baseline Hb high 7-8 range.  Prior Iron panel c/w DAYSI (low total iron 17, % saturation 8, TIBC wnl 224)   Currently suspect iso R hip bleeding pressure wound, now s/p debridement via Surgery.   No melena or hematemesis noted at home per HHA at bedside.  - s/p 2pRBC, f/u post-transfusion CBC  - Transfuse for Hgb <7   - Maintain active T&S      PREVENTION:  F: S/p 2L NS bolus x2  E: Monitor, Replete to K>4 and Mg>2  N: PEG tube feeds when appropriate  DVT ppx: Hold iso Hemorrhagic shock  GI ppx: NI  CODE STATUS: FULL    DISPO: [facility]  Discussed with ICU attending.

## 2025-01-07 NOTE — CONSULT NOTE ADULT - ATTENDING COMMENTS
ACS Attending Note Attestation    Patient is examined and evaluated at the bedside with the residents/PAs. Treatment plan discussed with the team, nurses, and consulting physicians and consulting teams. Medications, radiological studies and all other relevant studies reviewed. I reviewed the resident/PA note and agreed with above assessment and plan with following additions and corrections.    BERNARD RODRIGUEZ Patient is a 89y old  Female who presents with a chief complaint of anemia 2/2 bleeding wound (07 Jan 2025 14:05)    Physical Exam:  Radiological studies reviewed by me with following noted:  Allergies    No Known Allergies    Intolerances      PAST MEDICAL & SURGICAL HISTORY:  HTN (hypertension)      Hypothyroid      HLD (hyperlipidemia)      Dementia      No significant past surgical history        Vital Signs Last 24 Hrs  T(C): 35.6 (07 Jan 2025 12:20), Max: 35.6 (07 Jan 2025 10:59)  T(F): 96 (07 Jan 2025 12:20), Max: 96 (07 Jan 2025 10:59)  HR: 115 (07 Jan 2025 17:44) (93 - 130)  BP: 101/69 (07 Jan 2025 17:44) (86/49 - 159/114)  BP(mean): 70 (07 Jan 2025 14:19) (10 - 85)  RR: 18 (07 Jan 2025 17:44) (15 - 21)  SpO2: 98% (07 Jan 2025 17:44) (94% - 99%)    Parameters below as of 07 Jan 2025 17:44  Patient On (Oxygen Delivery Method): room air                            8.9    21.24 )-----------( 241      ( 07 Jan 2025 14:44 )             28.5     01-07    138  |  104  |  37[H]  ----------------------------<  133[H]  4.1   |  25  |  0.54    Ca    6.9[L]      07 Jan 2025 13:12  Phos  3.5     01-07  Mg     1.8     01-07    TPro  4.5[L]  /  Alb  1.8[L]  /  TBili  0.5  /  DBili  x   /  AST  23  /  ALT  14  /  AlkPhos  73  01-07      89F PMH dementia (axo at baseline), HTN, HLD, hypothyroidism, s/p EGD PEG 2/2 dysphagia, acute metabolic encephalopathy (medicine 8/6/24-8/9/24), PSH of [facility] (with Dr. Restrepo, JUly 2019), presenting for bleeding right hip pressure wound after debridement with wound care at home the day prior.     Recs:  - f/u GI PCR  - hemostasis achieved at beside with 2 vicryl stitches and Surgicel  - rec medicine admission  - NTD for sacral wound, no sign of infection  - no need for surgical intervention  - keep dressing on, surgery team will change dressing in the morning        Jennie Swift MD, FACS  Trauma/ACS/Surgical Critical Care Attending
Bleeding controlled by surgery and pt adequately resuscitated with clearance of lactate and stable Hb. Agree with antibiotics post debridement. Please call prn.

## 2025-01-07 NOTE — H&P ADULT - NSHPPHYSICALEXAM_GEN_ALL_CORE
General: NAD  HEENT: PERRL, EOM, anicteric sclera, MMM  Cardiovascular: +S1/S2; RRR; no M/R/G  Respiratory: CTAB; no W/R/R  Gastrointestinal: soft, NT/ND; +BS x4  Extremities: WWP; 2+ peripheral pulses bilaterally; no LE edema  Skin: Sub-acute wounds of R hip and sacrum. Stage III sacral ulcer (~3 inch) with mild purulence present. R hip with overlying bandage c/d/i s/p debridement.  Neurologic: AOx0, no focal deficits

## 2025-01-07 NOTE — H&P ADULT - NSHPLABSRESULTS_GEN_ALL_CORE
LABS:                         9.2    24.08 )-----------( 240      ( 07 Jan 2025 13:12 )             28.7     01-07    138  |  104  |  37[H]  ----------------------------<  133[H]  4.1   |  25  |  0.54    Ca    6.9[L]      07 Jan 2025 13:12  Phos  3.5     01-07  Mg     1.8     01-07    TPro  4.5[L]  /  Alb  1.8[L]  /  TBili  0.5  /  DBili  x   /  AST  23  /  ALT  14  /  AlkPhos  73  01-07    PT/INR - ( 07 Jan 2025 08:30 )   PT: 12.4 sec;   INR: 1.06          PTT - ( 07 Jan 2025 08:30 )  PTT:26.2 sec  Urinalysis Basic - ( 07 Jan 2025 13:12 )    Color: x / Appearance: x / SG: x / pH: x  Gluc: 133 mg/dL / Ketone: x  / Bili: x / Urobili: x   Blood: x / Protein: x / Nitrite: x   Leuk Esterase: x / RBC: x / WBC x   Sq Epi: x / Non Sq Epi: x / Bacteria: x            Lactate, Blood: 2.8 mmol/L (01-07 @ 13:12)  Lactate, Blood: 8.0 mmol/L (01-07 @ 10:00)  Lactate, Blood: 10.4 mmol/L (01-07 @ 08:30)      RADIOLOGY, EKG & ADDITIONAL TESTS:

## 2025-01-07 NOTE — ED PROVIDER NOTE - PROGRESS NOTE DETAILS
pt with bleeding from ulcer.  wound packed with pressure dressing upon initial assessment.  surgery consulted upon arrival.  came down and threw multiple stitches to close vascular bleed, repacked and team reports hemostasis.  rec admit to medicine for further eval and obs.  lactate and wbc noted.  serial labs noted and vs improved.  micu consulted and rec admit to floor as pt stabilized s/p 2 units and source control.

## 2025-01-07 NOTE — ED ADULT NURSE REASSESSMENT NOTE - NS ED NURSE REASSESS COMMENT FT1
md ross requesting 2 units of emergent blood to be given. blood bank called to notify of emergent untyped blood 2 units. paperwork sent with GIA.

## 2025-01-07 NOTE — ED PROVIDER NOTE - CARE PROVIDER_API CALL
Samara Rico  Urology  [address]  [place] [address]  Phone: [phone]  Fax: [phone]  Follow Up Time: 1-3 Days

## 2025-01-07 NOTE — H&P ADULT - PROBLEM SELECTOR PLAN 4
PEG placed by Dr. Loza iso dysphagia 2/2 dementia and failure to thrive. In place and ready to resume tube feeding once on RMF.   -tube feeds - Jevity 1.2 with goal rate of 47 ml/hr with LPS x1/day from 3744-8128 to provide 846 ml tube feed, 1115 calories, 62 g Prot., and 683 ml free water  -nutrition consult  -aspiration precautions

## 2025-01-07 NOTE — CONSULT NOTE ADULT - SUBJECTIVE AND OBJECTIVE BOX
Critical Care Consult Note    HPI:  The patient is a 88yo female with PMHx of dementia (A&Ox0 at baseline), HTN, HLD, hypothyroidism presents for possible PEG placement tomorrow iso dysphagia. History provided by ED provider from son/daughter in law at bedside; no longer at bedside during this writers evaluation of the patient. Pt has ~1w of decreased PO intake. Seems to aspirate/cough every time she tries to eat. Pt was very sleepy over the past 1-2 weeks and was started on Levaquin empirically with improvement; unknown infectious source. Lethargy has improved and now patient is alert and at baseline, although family admits she is still very pale. Admits to intermittent constipation. Denies fevers, cough, vomiting, diarrhea. Has known sacral sores. Family was in touch w/ Stefano Carter/Dr. Loza, was referred to ED for likely PEG tomorrow. Baseline HR reportedly ~110. No reported fevers, coughing when not eating, vomiting, diarrhea.    In the ED, patient was aferbile, HR , /73, RR 18, SpO2 97% RA. Found to have Hgb of 8.4, Na 167, Cl 126, bicarb 33, and BUN 41. Given NS 500cc bolus x2.       ROS: Unable to obtain.      No Known Allergies        Other Medications:  heparin   Injectable 5000 Unit(s) SubCutaneous every 12 hours      FAMILY HISTORY:    PAST MEDICAL & SURGICAL HISTORY:  HTN (hypertension)  Hypothyroidism  HLD (hyperlipidemia)  Dementia      SOCIAL HISTORY: Unable to obtain. HHA at bedside. Unable to reach family.       Daily Height in cm: 160.02 (05 Aug 2024 20:40)    Vital Signs Last 24 Hrs  T(C): 36.3 (05 Aug 2024 23:57), Max: 36.7 (05 Aug 2024 20:40)  T(F): 97.4 (05 Aug 2024 23:57), Max: 98 (05 Aug 2024 20:40)  HR: 84 (06 Aug 2024 02:56) (84 - 106)  BP: 94/54 (06 Aug 2024 02:56) (94/54 - 107/73)  BP(mean): 69 (06 Aug 2024 02:56) (69 - 69)  RR: 18 (06 Aug 2024 02:56) (18 - 18)  SpO2: 94% (06 Aug 2024 02:56) (94% - 97%)    Parameters below as of 06 Aug 2024 02:56  Patient On (Oxygen Delivery Method): room air      PHYSICAL EXAM:  Constitutional: elderly cachectic/malnourished female, alert, resting comfortably; NAD  HEENT: NC/AT, PERRL, anicteric sclera, dry MM  Neck: supple  Respiratory: CTA b/l, no W/R/R, no retractions  Cardiovascular: +S1/S2; RRR; +Grade 3/6 systolic murmur best heard at RUSB (unable to appreciate radiation)  Gastrointestinal: soft, (+) BS, NT/ND; no rebound or guarding  Extremities: 2+ pitting edema up to mid-thighs; No joint swelling, edema or erythema  Vascular: Cool hands/feet, 1+ radial/DP/PT pulses, poor capillary refill  Dermatologic: warm, dry, intact; no rashes  Neurologic: alert & awake, not able to answer questions, nonsensical speech, moving all extremities spontaneously  Psychiatric: calm, occasionally agitated from touch   Critical Care Consult Note    HPI:  The patient is a 89yo female with PMHx of dementia (A&Ox0 at baseline), HTN, HLD, hypothyroidism, PEG Tube iso dysphagia who presents due to bleeding of right hip pressure wound after debridement with wound care at home 1 day ago. Aide and daughter at bedside, who report that both sacral and right hip wounds have been present for about 3-4 months. Wound care stopped bleeding intermittently but bleeding resumed shortly after. Denies any abnormal discharge. Of note, patient has been having loose stools for the last several days-weeks.     Denies fevers, cough, vomiting, diarrhea. Baseline HR reportedly ~110. No reported fevers, coughing when not eating, vomiting, or bloody stools.    In the ED, patient was afebrile, , /114>115/53, RR 20, SpO2 95% RA. Found to have Hgb of 6.4>4.9, WBC 21.71>30.45, Platelets 482>283, K 5.6, AG 18>13, BUN 44>47, Lactate 10.4>8.0.    Surgery consulted for bleeding R hip wound, now s/p debridement of 3cm wound with hemostasis achieved with 2 stitches and surgicell.       ROS: Unable to obtain.      Allergies: No Known Allergies    FAMILY HISTORY: N/A    PAST MEDICAL & SURGICAL HISTORY:  ·	HTN (hypertension)  ·	Hypothyroidism  ·	HLD (hyperlipidemia)  ·	Dementia    SOCIAL HISTORY:   Lives at home, with HHA.   Fully dependent for ADLs, Bedbound.    Vital Signs Last 24 Hrs  T(C): --  T(F): --  HR: 130 (07 Jan 2025 08:21) (130 - 130)  BP: 115/53 (07 Jan 2025 08:21) (115/53 - 159/114)  BP(mean): 77 (07 Jan 2025 08:21) (77 - 77)  RR: 20 (07 Jan 2025 08:21) (20 - 20)  SpO2: 95% (07 Jan 2025 07:43) (95% - 95%)    Parameters below as of 07 Jan 2025 07:43  Patient On (Oxygen Delivery Method): room air      PHYSICAL EXAM:  Constitutional: elderly cachectic/malnourished female, alert, resting comfortably; NAD  HEENT: NC/AT, anicteric sclera, dry MM  Neck: supple  Respiratory: CTA b/l, no W/R/R, no retractions  Cardiovascular: +S1/S2; RRR; No murmurs, rubs, or gallops  Gastrointestinal: soft, (+) BS, NT/ND; no rebound or guarding  MSK: Sub-acute wounds of R hip and sacrum. Stage III sacral ulcer (~3 inch) with mild purulence present. R hip with overlying bandage c/d/i s/p debridement.  Extremities: No joint swelling, edema or erythema  Dermatologic: warm, dry, intact; no rashes  Neurologic: AxO x0, not able to answer questions, nonsensical speech, moving all extremities spontaneously  Psychiatric: calm   Critical Care Consult Note    HPI:  The patient is a 88yo female with PMHx of dementia (A&Ox0 at baseline), HTN, HLD, hypothyroidism, PEG Tube iso dysphagia who presents due to bleeding of right hip pressure wound after debridement with wound care at home 1 day ago. Aide and daughter at bedside, who report that both sacral and right hip wounds have been present for about 3-4 months. Wound care stopped bleeding intermittently but bleeding resumed shortly after. Denies any abnormal discharge. Of note, patient has been having loose stools for the last several days-weeks.     Baseline HR reportedly ~110-120s. No reported fevers, coughing when not eating, vomiting, or bloody stools.    In the ED, patient was afebrile, , /114>115/53, RR 20, SpO2 95% RA. Found to have Hgb of 6.4>4.9, WBC 21.71>30.45, Platelets 482>283, K 5.6, AG 18>13, BUN 44>47, Lactate 10.4>8.0.    Surgery consulted for bleeding R hip wound, now s/p debridement of 3cm wound with hemostasis achieved with 2 stitches and surgicell.       ROS: Unable to obtain.      Allergies: No Known Allergies    FAMILY HISTORY: N/A    PAST MEDICAL & SURGICAL HISTORY:  ·	HTN (hypertension)  ·	Hypothyroidism  ·	HLD (hyperlipidemia)  ·	Dementia    SOCIAL HISTORY:   Lives at home, with HHA.   Fully dependent for ADLs, Bedbound.    Vital Signs Last 24 Hrs  T(C): --  T(F): --  HR: 130 (07 Jan 2025 08:21) (130 - 130)  BP: 115/53 (07 Jan 2025 08:21) (115/53 - 159/114)  BP(mean): 77 (07 Jan 2025 08:21) (77 - 77)  RR: 20 (07 Jan 2025 08:21) (20 - 20)  SpO2: 95% (07 Jan 2025 07:43) (95% - 95%)    Parameters below as of 07 Jan 2025 07:43  Patient On (Oxygen Delivery Method): room air      PHYSICAL EXAM:  Constitutional: elderly cachectic/malnourished female, alert, resting comfortably; NAD  HEENT: NC/AT, anicteric sclera, dry MM  Neck: supple  Respiratory: CTA b/l, no W/R/R, no retractions  Cardiovascular: +S1/S2; RRR; No murmurs, rubs, or gallops  Gastrointestinal: soft, (+) BS, NT/ND; no rebound or guarding  MSK: Sub-acute wounds of R hip and sacrum. Stage III sacral ulcer (~3 inch) with mild purulence present. R hip with overlying bandage c/d/i s/p debridement.  Extremities: No joint swelling, edema or erythema  Dermatologic: warm, dry, intact; no rashes  Neurologic: AxO x0, not able to answer questions, nonsensical speech, moving all extremities spontaneously  Psychiatric: calm

## 2025-01-07 NOTE — H&P ADULT - PROBLEM SELECTOR PLAN 1
Patient in ED afebrile with , /114, RR 20, spO2 95% RA. Hgb from 6.4 -> 4.9 and WBC 21.71 -> 30.45. Drop in platelets from 482 -> 283 and lactate documented at 10.4. Hemorrhagic shock from wound vs septic shock 2/2 GI source given loose stools this last week. Blood Cx collected. Started on empiric broad spectrum abx. Wounds closed in ED via surgery and 2u pRBC and 2L NS bolus given. CXR not performed. Hgb improved to 8.9, WBC downtrending, lactate downtrending to 2.8. CT-AP showed only large open sacral decubitus ulcer of right lateral proximal thigh. UA negative.   -c/w zosyn and vanc  -check PM lactate to ensure <2.0  -f/u blood cx  -f/u wound cx  -f/u GI PCR  -discontinue maintenance fluids as tube feeds will restart

## 2025-01-07 NOTE — PROCEDURE NOTE - NSICDXPROCEDURE_GEN_ALL_CORE_FT
PROCEDURES:  Suture, ulcer site, stomach 07-Jan-2025 10:36:03 suture of vessel in hip wound not stomach Michael Tyson

## 2025-01-07 NOTE — CONSULT NOTE ADULT - SUBJECTIVE AND OBJECTIVE BOX
HPI: 89F PMH dementia (axo at baseline), HTN, HLD, hypothyroidism, s/p EGD PEG 2/2 dysphagia, acute metabolic encephalopathy (medicine 8/6/24-8/9/24), PSH of [facility] (with Dr. Restrepo, JUly 2019), presenting for bleeding of right hip pressure wound after debridement with wound care at home day prior. Aid at beside, reports both sacral and right hip wound have been present for about 3-4 months. Denies any abnormal discharge. Reports patient have been having soft stool for the past couple of days (x3/day). Wound care stopped bleeding intermittently but bleeding resumed shortly after.       PAST MEDICAL & SURGICAL HISTORY:  HTN (hypertension)      Hypothyroid      HLD (hyperlipidemia)      Dementia      No significant past surgical history          REVIEW OF SYSTEMS   as stated above.      Allergic/Immunologic:	    MEDICATIONS  (STANDING):  sodium chloride 0.9% Bolus 1000 milliLiter(s) IV Bolus once  sodium chloride 0.9% Bolus 1000 milliLiter(s) IV Bolus once  vancomycin  IVPB 500 milliGRAM(s) IV Intermittent Once    MEDICATIONS  (PRN):      Allergies    No Known Allergies    Intolerances        SOCIAL HISTORY:    FAMILY HISTORY:      Vital Signs Last 24 Hrs  T(C): --  T(F): --  HR: 130 (07 Jan 2025 08:21) (130 - 130)  BP: 115/53 (07 Jan 2025 08:21) (115/53 - 159/114)  BP(mean): 77 (07 Jan 2025 08:21) (77 - 77)  RR: 20 (07 Jan 2025 08:21) (20 - 20)  SpO2: 95% (07 Jan 2025 07:43) (95% - 95%)    Parameters below as of 07 Jan 2025 07:43  Patient On (Oxygen Delivery Method): room air        PHYSICAL EXAM:  CONSTITUTIONAL: Well groomed, no apparent distress  RESP: No respiratory distress, no use of accessory muscles  CV: RRR  GI: Soft, NT, ND, sacral wound with no discharge, or sign of infection, right hip wound with mild bleeding with agitation with healthy bed of tissue  Ext: wwp, no edema    LABS:                        6.3    21.71 )-----------( 482      ( 07 Jan 2025 08:30 )             22.5     01-07    138  |  96  |  44[H]  ----------------------------<  275[H]  5.6[H]   |  24  |  0.70    Ca    8.7      07 Jan 2025 08:30    TPro  5.7[L]  /  Alb  2.2[L]  /  TBili  0.2  /  DBili  x   /  AST  20  /  ALT  15  /  AlkPhos  92  01-07    PT/INR - ( 07 Jan 2025 08:30 )   PT: 12.4 sec;   INR: 1.06          PTT - ( 07 Jan 2025 08:30 )  PTT:26.2 sec  Urinalysis Basic - ( 07 Jan 2025 08:30 )    Color: x / Appearance: x / SG: x / pH: x  Gluc: 275 mg/dL / Ketone: x  / Bili: x / Urobili: x   Blood: x / Protein: x / Nitrite: x   Leuk Esterase: x / RBC: x / WBC x   Sq Epi: x / Non Sq Epi: x / Bacteria: x        RADIOLOGY & ADDITIONAL STUDIES:

## 2025-01-07 NOTE — H&P ADULT - ASSESSMENT
89F with PMHx of dementia AOx0, HTN, HLD, hypothyroidism, PEG tube 2/2 dysphagia, and chronic wounds presenting with bleeding from right hip prsesure wound s/p debridement at home with wound care on 1/6, admitted for severe anemia and severe sepsis. Wound closed in ED and ICU consulted, patient cleared for RMF.

## 2025-01-07 NOTE — H&P ADULT - PROBLEM SELECTOR PLAN 9
F: none  E: replete as needed  N: tube feeds  VTE ppx: lovenox  GI ppx: none   Dispo: [facility]  Code status: full F: none  E: replete as needed  N: tube feeds  VTE ppx: holding iso bleeding  GI ppx: none   Dispo: F  Code status: full

## 2025-01-07 NOTE — ED PROVIDER NOTE - PHYSICAL EXAMINATION
Elderly frail contracted nad  Large hematoma burden in R hip ulcer with soaked dressing  Small arterial bleeder pumping red blood from center of wound when hematoma was unroofed  Mild pallor  No mottling  Belly soft  Contracture present  Limited MS and Neuro exam  Clear lungs  Nl heart sounds

## 2025-01-07 NOTE — ED PROVIDER NOTE - NSFOLLOWUPINSTRUCTIONS_ED_ALL_ED_FT
Renal Colic    WHAT YOU NEED TO KNOW:    Renal colic is severe pain in your lower back or sides. The pain is usually on one side, but may be on both sides of your lower back. Renal colic may start quickly, come and go, and become worse over time. Renal colic is caused by a blockage in your urinary tract. The most common cause of a blockage is a kidney stone. Blood clots, ureter spasms, and dead tissue may also block your urinary tract.         DISCHARGE INSTRUCTIONS:    Return to the emergency department if:     You cannot stop vomiting.      You see new or increased bleeding when you urinate.      You are urinating less than usual, or not at all.      Your pain is not getting better even after treatment.    Contact your healthcare provider if:     You have fever.      You need to urinate more often than usual, or right away.      You see a stone in your urine strainer after you urinate.      You have questions or concerns about your condition or care.    Medicines:     Medicines may help decrease pain and muscle spasms. You may also need medicine to calm your stomach and stop vomiting.      Take your medicine as directed. Contact your healthcare provider if you think your medicine is not helping or if you have side effects. Tell him of her if you are allergic to any medicine. Keep a list of the medicines, vitamins, and herbs you take. Include the amounts, and when and why you take them. Bring the list or the pill bottles to follow-up visits. Carry your medicine list with you in case of an emergency.    Manage your symptoms:     Drink liquids as directed to help decrease pain and flush blockages from your urinary tract. Ask how much liquid to drink each day and which liquids are best for you. You may need to drink about 3 liters (12 glasses) of liquids each day. Half of your total daily liquids should be water. Limit coffee, tea, and soda to 2 cups daily. Your urine should be pale and clear.      Strain your urine every time you urinate. Urinate into a strainer (funnel with a fine mesh on the bottom) or glass jar to collect kidney stones. Give the kidney stones to your healthcare provider at your next visit.Look for Stones in the Filter           Eat a variety of healthy foods. Healthy foods include fruits, vegetables, whole-grain breads, low-fat dairy products, beans, lean meats, and fish. You may need to increase the amount of citrus fruit you eat, such as oranges. Ask your healthcare provider how much salt, calcium, and protein you should eat.      Avoid activity in hot temperatures. Heat may cause you to become dehydrated and urinate less.    Follow up with your healthcare provider as directed: You may need to return for tests to check if your blockage has cleared. Write down your questions so you remember to ask them during your visits       © Copyright [employer] 2018 All illustrations and images included in CareNotes are the copyrighted property of [employer] or [employer]

## 2025-01-07 NOTE — PROCEDURE NOTE - ADDITIONAL PROCEDURE DETAILS
Wound was agitated to locate site of bleeding. Upon identification 2 vicryl sutures were used to place stitches. Surgicell was used on top for further hemostasis. Wound packed with gauze, secured with abd pad and tape.

## 2025-01-07 NOTE — H&P ADULT - HISTORY OF PRESENT ILLNESS
HPI: 89F with PMHx of dementia AOx0, HTN, HLD, hypothyroidism, PEG tube 2/2 dysphagia, and chronic wounds presenting with bleeding from right hip prsesure wound s/p debridement at home with wound care on 1/6, admitted for severe anemia and severe sepsis. 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 She was undergoing debridement of her R right wound yesterday when bleeding began and was only temporarily stopped before resuming shortly afterwards. She has not had any discharge and denies fevers, nausea, vomiting, or chills. She has had diarrhea for several weeks.     In the emergency department, she was seen by surgery and 2 sutures were used to close her wound. She was started on broad spectrum abx empirically.  Her anemia however worsened and hemoglobin dropped, requiring 2u total for transfusion. Her lactic acid was elevated but downtrended appropriately following volume + blood resuscitation and would closure. ICU consult team evaluated the patient and cleared her for RMF.       In the ED,  VS: T 96.0 , HR 97 , BP  93/55, MAP 70.  RR  21  , SpO2   97  %   Labs: WBC 21.71 -> 30.45 -> 24.08, Hgb 6.3 -> 4.9 -> 9.2, lactic acid 10.4 -> 8.0 -> 2.8  Urine: deferred  EKG: sinus tach, lateral ST depression  CXR: not done  Imaging: CTAP - No bowel obstruction. Large open sacral decubitus ulcer right lateral proximal thigh  Orders: zosyn, vanc, 2L NS bolus, 150 mL/hr NS fluids, 2u pRBC, surg consult - 2 sutures placed to close wound

## 2025-01-08 ENCOUNTER — TRANSCRIPTION ENCOUNTER (OUTPATIENT)
Age: 89
End: 2025-01-08

## 2025-01-08 VITALS — WEIGHT: 105.16 LBS

## 2025-01-08 LAB
ANION GAP SERPL CALC-SCNC: 6 MMOL/L — SIGNIFICANT CHANGE UP (ref 5–17)
BASOPHILS # BLD AUTO: 0 K/UL — SIGNIFICANT CHANGE UP (ref 0–0.2)
BASOPHILS NFR BLD AUTO: 0 % — SIGNIFICANT CHANGE UP (ref 0–2)
BUN SERPL-MCNC: 26 MG/DL — HIGH (ref 7–23)
CALCIUM SERPL-MCNC: 8 MG/DL — LOW (ref 8.4–10.5)
CHLORIDE SERPL-SCNC: 107 MMOL/L — SIGNIFICANT CHANGE UP (ref 96–108)
CO2 SERPL-SCNC: 28 MMOL/L — SIGNIFICANT CHANGE UP (ref 22–31)
CREAT SERPL-MCNC: 0.44 MG/DL — LOW (ref 0.5–1.3)
EGFR: 92 ML/MIN/1.73M2 — SIGNIFICANT CHANGE UP
EOSINOPHIL # BLD AUTO: 0 K/UL — SIGNIFICANT CHANGE UP (ref 0–0.5)
EOSINOPHIL NFR BLD AUTO: 0 % — SIGNIFICANT CHANGE UP (ref 0–6)
GLUCOSE SERPL-MCNC: 102 MG/DL — HIGH (ref 70–99)
HCT VFR BLD CALC: 24.7 % — LOW (ref 34.5–45)
HGB BLD-MCNC: 7.7 G/DL — LOW (ref 11.5–15.5)
LYMPHOCYTES # BLD AUTO: 0.38 K/UL — LOW (ref 1–3.3)
LYMPHOCYTES # BLD AUTO: 4.5 % — LOW (ref 13–44)
MAGNESIUM SERPL-MCNC: 2 MG/DL — SIGNIFICANT CHANGE UP (ref 1.6–2.6)
MCHC RBC-ENTMCNC: 25.1 PG — LOW (ref 27–34)
MCHC RBC-ENTMCNC: 31.2 G/DL — LOW (ref 32–36)
MCV RBC AUTO: 80.5 FL — SIGNIFICANT CHANGE UP (ref 80–100)
MONOCYTES # BLD AUTO: 0.23 K/UL — SIGNIFICANT CHANGE UP (ref 0–0.9)
MONOCYTES NFR BLD AUTO: 2.7 % — SIGNIFICANT CHANGE UP (ref 2–14)
NEUTROPHILS # BLD AUTO: 7.83 K/UL — HIGH (ref 1.8–7.4)
NEUTROPHILS NFR BLD AUTO: 92.8 % — HIGH (ref 43–77)
PHOSPHATE SERPL-MCNC: 2.8 MG/DL — SIGNIFICANT CHANGE UP (ref 2.5–4.5)
PLATELET # BLD AUTO: 256 K/UL — SIGNIFICANT CHANGE UP (ref 150–400)
POTASSIUM SERPL-MCNC: 3.8 MMOL/L — SIGNIFICANT CHANGE UP (ref 3.5–5.3)
POTASSIUM SERPL-SCNC: 3.8 MMOL/L — SIGNIFICANT CHANGE UP (ref 3.5–5.3)
RBC # BLD: 3.07 M/UL — LOW (ref 3.8–5.2)
RBC # FLD: 16.7 % — HIGH (ref 10.3–14.5)
SODIUM SERPL-SCNC: 141 MMOL/L — SIGNIFICANT CHANGE UP (ref 135–145)
WBC # BLD: 8.44 K/UL — SIGNIFICANT CHANGE UP (ref 3.8–10.5)
WBC # FLD AUTO: 8.44 K/UL — SIGNIFICANT CHANGE UP (ref 3.8–10.5)

## 2025-01-08 PROCEDURE — 12002 RPR S/N/AX/GEN/TRNK2.6-7.5CM: CPT

## 2025-01-08 PROCEDURE — 83605 ASSAY OF LACTIC ACID: CPT

## 2025-01-08 PROCEDURE — 85025 COMPLETE CBC W/AUTO DIFF WBC: CPT

## 2025-01-08 PROCEDURE — 85027 COMPLETE CBC AUTOMATED: CPT

## 2025-01-08 PROCEDURE — 84132 ASSAY OF SERUM POTASSIUM: CPT

## 2025-01-08 PROCEDURE — 96375 TX/PRO/DX INJ NEW DRUG ADDON: CPT

## 2025-01-08 PROCEDURE — P9016: CPT

## 2025-01-08 PROCEDURE — 96374 THER/PROPH/DIAG INJ IV PUSH: CPT

## 2025-01-08 PROCEDURE — 36430 TRANSFUSION BLD/BLD COMPNT: CPT

## 2025-01-08 PROCEDURE — 99291 CRITICAL CARE FIRST HOUR: CPT | Mod: 25

## 2025-01-08 PROCEDURE — 84443 ASSAY THYROID STIM HORMONE: CPT

## 2025-01-08 PROCEDURE — 87449 NOS EACH ORGANISM AG IA: CPT

## 2025-01-08 PROCEDURE — 80053 COMPREHEN METABOLIC PANEL: CPT

## 2025-01-08 PROCEDURE — 85610 PROTHROMBIN TIME: CPT

## 2025-01-08 PROCEDURE — 82803 BLOOD GASES ANY COMBINATION: CPT

## 2025-01-08 PROCEDURE — 87324 CLOSTRIDIUM AG IA: CPT

## 2025-01-08 PROCEDURE — 84295 ASSAY OF SERUM SODIUM: CPT

## 2025-01-08 PROCEDURE — 85730 THROMBOPLASTIN TIME PARTIAL: CPT

## 2025-01-08 PROCEDURE — 82330 ASSAY OF CALCIUM: CPT

## 2025-01-08 PROCEDURE — 71045 X-RAY EXAM CHEST 1 VIEW: CPT

## 2025-01-08 PROCEDURE — 36415 COLL VENOUS BLD VENIPUNCTURE: CPT

## 2025-01-08 PROCEDURE — 86900 BLOOD TYPING SEROLOGIC ABO: CPT

## 2025-01-08 PROCEDURE — 93005 ELECTROCARDIOGRAM TRACING: CPT

## 2025-01-08 PROCEDURE — 80048 BASIC METABOLIC PNL TOTAL CA: CPT

## 2025-01-08 PROCEDURE — 11042 DBRDMT SUBQ TIS 1ST 20SQCM/<: CPT

## 2025-01-08 PROCEDURE — 86901 BLOOD TYPING SEROLOGIC RH(D): CPT

## 2025-01-08 PROCEDURE — 86850 RBC ANTIBODY SCREEN: CPT

## 2025-01-08 PROCEDURE — 81001 URINALYSIS AUTO W/SCOPE: CPT

## 2025-01-08 PROCEDURE — 87507 IADNA-DNA/RNA PROBE TQ 12-25: CPT

## 2025-01-08 PROCEDURE — 49900 REPAIR OF ABDOMINAL WALL: CPT

## 2025-01-08 PROCEDURE — 86920 COMPATIBILITY TEST SPIN: CPT

## 2025-01-08 PROCEDURE — 74177 CT ABD & PELVIS W/CONTRAST: CPT | Mod: MC

## 2025-01-08 PROCEDURE — 84100 ASSAY OF PHOSPHORUS: CPT

## 2025-01-08 PROCEDURE — 87040 BLOOD CULTURE FOR BACTERIA: CPT

## 2025-01-08 PROCEDURE — 83735 ASSAY OF MAGNESIUM: CPT

## 2025-01-08 RX ORDER — SODIUM CHLORIDE 9 MG/ML
1000 INJECTION, SOLUTION INTRAMUSCULAR; INTRAVENOUS; SUBCUTANEOUS
Refills: 0 | Status: DISCONTINUED | OUTPATIENT
Start: 2025-01-08 | End: 2025-01-08

## 2025-01-08 RX ORDER — CEFUROXIME AXETIL 250 MG
1 TABLET ORAL
Qty: 14 | Refills: 0
Start: 2025-01-08 | End: 2025-01-14

## 2025-01-08 RX ADMIN — ATORVASTATIN CALCIUM 10 MILLIGRAM(S): 40 TABLET, FILM COATED ORAL at 00:00

## 2025-01-08 RX ADMIN — PIPERACILLIN AND TAZOBACTAM 25 GRAM(S): 3; .375 INJECTION, POWDER, LYOPHILIZED, FOR SOLUTION INTRAVENOUS at 04:03

## 2025-01-08 RX ADMIN — VANCOMYCIN HYDROCHLORIDE 100 MILLIGRAM(S): 5 INJECTION, POWDER, LYOPHILIZED, FOR SOLUTION INTRAVENOUS at 11:16

## 2025-01-08 RX ADMIN — SODIUM CHLORIDE 150 MILLILITER(S): 9 INJECTION, SOLUTION INTRAMUSCULAR; INTRAVENOUS; SUBCUTANEOUS at 04:15

## 2025-01-08 RX ADMIN — PIPERACILLIN AND TAZOBACTAM 25 GRAM(S): 3; .375 INJECTION, POWDER, LYOPHILIZED, FOR SOLUTION INTRAVENOUS at 12:46

## 2025-01-08 RX ADMIN — SODIUM CHLORIDE 150 MILLILITER(S): 9 INJECTION, SOLUTION INTRAMUSCULAR; INTRAVENOUS; SUBCUTANEOUS at 00:22

## 2025-01-08 NOTE — DIETITIAN INITIAL EVALUATION ADULT - PROBLEM SELECTOR PLAN 4
PEG placed by Dr. Loza iso dysphagia 2/2 dementia and failure to thrive. In place and ready to resume tube feeding once on RMF.   -tube feeds - Jevity 1.2 with goal rate of 47 ml/hr with LPS x1/day from 2568-5270 to provide 846 ml tube feed, 1115 calories, 62 g Prot., and 683 ml free water  -nutrition consult  -aspiration precautions

## 2025-01-08 NOTE — DISCHARGE NOTE PROVIDER - HOSPITAL COURSE
#Discharge: do not delete    89F with PMHx of dementia AOx0, HTN, HLD, hypothyroidism, PEG tube 2/2 dysphagia, and chronic wounds presenting with bleeding from right hip prsesure wound s/p debridement at home with wound care on 1/6, admitted for severe anemia and severe sepsis. Wound closed in ED and ICU consulted, patient cleared for RMF.     Problem List/Main Diagnoses (system-based):   #Sepsis  , /114, RR 20, spO2 95% RA. Hgb from 6.4 -> 4.9 and WBC 21.71 -> 30.45  Lactate 10.4  Hemorrhagic shock from wound vs septic shock 2/2 GI source given loose stools this last week.  Wounds closed in ED via surgery and 2u pRBC and 2L NS bolus given.  s/p vanc, zosyn  - discharge on ceftin 500mg BID x 7 days (1/8/25 - 1/14/25)    #Anemia  Hgb 6.3 on admission , MCV 82.1. 2/2 to bleeding right hip pressure wound. Dropped to 4.9 but improved s/p wound closure, pressure, and 2u pRBC.   - f/u w/ PCP    #Pressure ulcer  Patient with chronic pressure ulcers of right hip and sacrum. Chronic in nature and managed by home health wound care services who initially debrided right hip wound prior to admission. Now s/p closure and covered with appropriate dressing. Wound cultures collected.  No further surgical intervention needed per surgery  Wound care instructions:    #S/P percutaneous endoscopic gastrostomy (PEG) tube placement.   PEG placed by Dr. Loza iso dysphagia 2/2 dementia and failure to thrive. In place and ready to resume tube feeding  - resume same tube feed regimen with the following: Jevity 1.2 with goal rate of 47 ml/hr with LPS x1/day from 0288-0197 to provide 846 ml tube feed, 1115 calories, 62 g Prot., and 683 ml free water    Patient was discharged to: home    New medications: Ceftin  Changes to old medications: None  Medications that were stopped: None    Items to follow up as outpatient: PCP    Physical exam at the time of discharge:     GENERAL: NAD, lying in bed comfortably  HEAD:  Atraumatic, normocephalic  EYES: deferred, pt asleep  NECK: Supple, trachea midline, no JVD  HEART: Regular rate and rhythm, systolic murmur on auscultation  LUNGS: Unlabored respirations.  Mild bibasilar crackles on auscultation  ABDOMEN: Soft, nontender, nondistended, +BS  EXTREMITIES: 2+ peripheral pulses bilaterally. No clubbing, cyanosis, or edema. Large pressure wound on R hip, dressing c/d/i without purulent discharge   NERVOUS SYSTEM:  A&Ox0  SKIN: No rashes or lesions      LABS & STUDIES:   #Discharge: do not delete    89F with PMHx of dementia AOx0, HTN, HLD, hypothyroidism, PEG tube 2/2 dysphagia, and chronic wounds presenting with bleeding from right hip prsesure wound s/p debridement at home with wound care on 1/6, admitted for severe anemia and severe sepsis. Wound closed in ED and ICU consulted, patient cleared for RMF.     Problem List/Main Diagnoses (system-based):   #Sepsis  , /114, RR 20, spO2 95% RA. Hgb from 6.4 -> 4.9 and WBC 21.71 -> 30.45  Lactate 10.4  Hemorrhagic shock from wound vs septic shock 2/2 GI source given loose stools this last week.  Wounds closed in ED via surgery and 2u pRBC and 2L NS bolus given.  s/p vanc, zosyn  - discharge on ceftin 500mg BID x 7 days (1/8/25 - 1/14/25)  - Wound care: Wet and dry Kerlix packing daily. Secure with clean gauze/abdominal pad with tape on top.    #Anemia  Hgb 6.3 on admission , MCV 82.1. 2/2 to bleeding right hip pressure wound. Dropped to 4.9 but improved s/p wound closure, pressure, and 2u pRBC.   - f/u w/ PCP    #Pressure ulcer  Patient with chronic pressure ulcers of right hip and sacrum. Chronic in nature and managed by home health wound care services who initially debrided right hip wound prior to admission. Now s/p closure and covered with appropriate dressing. Wound cultures collected.  No further surgical intervention needed per surgery  Wound care instructions:    #S/P percutaneous endoscopic gastrostomy (PEG) tube placement.   PEG placed by Dr. Dago smith dysphagia 2/2 dementia and failure to thrive. In place and ready to resume tube feeding  - resume same tube feed regimen with the following: Jevity 1.2 with goal rate of 47 ml/hr with LPS x1/day from 2239-3224 to provide 846 ml tube feed, 1115 calories, 62 g Prot., and 683 ml free water    Patient was discharged to: home    New medications: Ceftin  Changes to old medications: None  Medications that were stopped: None    Items to follow up as outpatient: PCP    Physical exam at the time of discharge:     GENERAL: NAD, lying in bed comfortably  HEAD:  Atraumatic, normocephalic  EYES: deferred, pt asleep  NECK: Supple, trachea midline, no JVD  HEART: Regular rate and rhythm, systolic murmur on auscultation  LUNGS: Unlabored respirations.  Mild bibasilar crackles on auscultation  ABDOMEN: Soft, nontender, nondistended, +BS  EXTREMITIES: 2+ peripheral pulses bilaterally. No clubbing, cyanosis, or edema. Large pressure wound on R hip, dressing c/d/i without purulent discharge   NERVOUS SYSTEM:  A&Ox0  SKIN: No rashes or lesions      LABS & STUDIES:

## 2025-01-08 NOTE — PROGRESS NOTE ADULT - SUBJECTIVE AND OBJECTIVE BOX
89F with PMHx of dementia AOx0, HTN, HLD, hypothyroidism, PEG tube 2/2 dysphagia, and chronic wounds presenting with bleeding from right hip prsesure wound s/p debridement at home with wound care on 1/6, admitted for severe anemia and severe sepsis. 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 She was undergoing debridement of her R right wound yesterday when bleeding began and was only temporarily stopped before resuming shortly afterwards. She has not had any discharge and denies fevers, nausea, vomiting, or chills. She has had diarrhea for several weeks.     In the emergency department, she was seen by surgery and 2 sutures were used to close her wound. She was started on broad spectrum abx empirically.  Her anemia however worsened and hemoglobin dropped, requiring 2u total for transfusion. Her lactic acid was elevated but downtrended appropriately following volume + blood resuscitation and would closure. ICU consult team evaluated the patient and cleared her for RMF.       In the ED,  VS: T 96.0 , HR 97 , BP  93/55, MAP 70.  RR  21  , SpO2   97  %   Labs: WBC 21.71 -> 30.45 -> 24.08, Hgb 6.3 -> 4.9 -> 9.2, lactic acid 10.4 -> 8.0 -> 2.8  Urine: deferred  EKG: sinus tach, lateral ST depression  CXR: not done  Imaging: CTAP - No bowel obstruction. Large open sacral decubitus ulcer right lateral proximal thigh  Orders: zosyn, vanc, 2L NS bolus, 150 mL/hr NS fluids, 2u pRBC, surg consult - 2 sutures placed to close wound   (07 Jan 2025 14:05)      REVIEW OF SYSTEMS:  unable    PAST MEDICAL & SURGICAL HISTORY:  HTN (hypertension)      Hypothyroid      HLD (hyperlipidemia)      Dementia      No significant past surgical history          FAMILY HISTORY:      SOCIAL HISTORY:  Smoking Status: [ ] Current, [ ] Former, [ ] Never  Pack Years:    MEDICATIONS:  MEDICATIONS  (STANDING):  acetaminophen   IVPB .. 600 milliGRAM(s) IV Intermittent once  atorvastatin 10 milliGRAM(s) Oral at bedtime  donepezil 10 milliGRAM(s) Oral at bedtime  levothyroxine 25 MICROGram(s) Oral daily  memantine 10 milliGRAM(s) Oral every 24 hours  piperacillin/tazobactam IVPB.. 3.375 Gram(s) IV Intermittent every 8 hours  QUEtiapine 25 milliGRAM(s) Oral every 24 hours  sodium chloride 0.9%. 1000 milliLiter(s) (150 mL/Hr) IV Continuous <Continuous>  traZODone 50 milliGRAM(s) Oral every 24 hours  vancomycin  IVPB 500 milliGRAM(s) IV Intermittent every 24 hours    MEDICATIONS  (PRN):      Allergies    No Known Allergies    Intolerances        Vital Signs Last 24 Hrs  T(C): 36.4 (08 Jan 2025 05:28), Max: 36.9 (07 Jan 2025 18:40)  T(F): 97.6 (08 Jan 2025 05:28), Max: 98.5 (07 Jan 2025 18:40)  HR: 101 (08 Jan 2025 05:28) (93 - 117)  BP: 100/62 (08 Jan 2025 05:28) (86/49 - 130/59)  BP(mean): 77 (07 Jan 2025 23:38) (10 - 85)  RR: 18 (08 Jan 2025 05:28) (15 - 21)  SpO2: 95% (08 Jan 2025 05:28) (94% - 99%)    Parameters below as of 08 Jan 2025 05:28  Patient On (Oxygen Delivery Method): room air        01-07 @ 07:01  -  01-08 @ 07:00  --------------------------------------------------------  IN: 2135 mL / OUT: 475 mL / NET: 1660 mL          PHYSICAL EXAM:    General: in no acute distress  HEENT: MMM, conjunctiva and sclera clear  Lungs: clear but poor insp  Heart: 100/min regular  Gastrointestinal: Soft, non-tender non-distended; Normal bowel sounds; No rebound or guarding PEG  Extremities:  No clubbing, cyanosis or edema  Neurological: non verbal  Skin: dressings on      LABS:                        7.7    8.44  )-----------( 256      ( 08 Jan 2025 05:30 )             24.7     01-07    138  |  104  |  37[H]  ----------------------------<  133[H]  4.1   |  25  |  0.54    Ca    6.9[L]      07 Jan 2025 13:12  Phos  3.5     01-07  Mg     1.8     01-07    TPro  4.5[L]  /  Alb  1.8[L]  /  TBili  0.5  /  DBili  x   /  AST  23  /  ALT  14  /  AlkPhos  73  01-07          RADIOLOGY & ADDITIONAL STUDIES:   
GENERAL SURGERY PROGRESS NOTE    SUBJECTIVE:   Patient seen and examined at bedside. No acute events noted overnight. Per aide at bedside, patient had slept through the night and noted no bleeding from R hip pressure wound.     Vital Signs Last 24 Hrs  T(C): 36.4 (08 Jan 2025 05:28), Max: 36.9 (07 Jan 2025 18:40)  T(F): 97.6 (08 Jan 2025 05:28), Max: 98.5 (07 Jan 2025 18:40)  HR: 101 (08 Jan 2025 05:28) (93 - 117)  BP: 100/62 (08 Jan 2025 05:28) (86/49 - 130/59)  BP(mean): 77 (07 Jan 2025 23:38) (10 - 85)  RR: 18 (08 Jan 2025 05:28) (15 - 21)  SpO2: 95% (08 Jan 2025 05:28) (94% - 99%)    Parameters below as of 08 Jan 2025 05:28  Patient On (Oxygen Delivery Method): room air        I&O's Summary    07 Jan 2025 07:01  -  08 Jan 2025 07:00  --------------------------------------------------------  IN: 2135 mL / OUT: 475 mL / NET: 1660 mL        Physical Exam:  General Appearance: Resting in bed, NAD  Pulmonary: Nonlabored breathing, no respiratory distress  Cardiovascular: NSR  Abdomen: Soft, nondistended, nontender. R hip pressure wound inspected - hemostatic without underlying purulence or non-viable tissue  Extremities: WWP, SCD's in place     LABS:                        7.7    8.44  )-----------( 256      ( 08 Jan 2025 05:30 )             24.7     01-08    141  |  107  |  26[H]  ----------------------------<  102[H]  3.8   |  28  |  0.44[L]    Ca    8.0[L]      08 Jan 2025 05:30  Phos  2.8     01-08  Mg     2.0     01-08    TPro  4.5[L]  /  Alb  1.8[L]  /  TBili  0.5  /  DBili  x   /  AST  23  /  ALT  14  /  AlkPhos  73  01-07    PT/INR - ( 07 Jan 2025 18:42 )   PT: 11.6 sec;   INR: 0.99          PTT - ( 07 Jan 2025 18:42 )  PTT:26.1 sec  Urinalysis Basic - ( 08 Jan 2025 05:30 )    Color: x / Appearance: x / SG: x / pH: x  Gluc: 102 mg/dL / Ketone: x  / Bili: x / Urobili: x   Blood: x / Protein: x / Nitrite: x   Leuk Esterase: x / RBC: x / WBC x   Sq Epi: x / Non Sq Epi: x / Bacteria: x      
OVERNIGHT EVENTS:    SUBJECTIVE / INTERVAL HPI: Patient seen and examined at bedside.       PHYSICAL EXAM:    General: Lying comfortably and in no acute distress  HEENT: NC/AT; EOMI, anicteric sclera; MMM  Neck: supple  Cardiovascular: +S1/S2, RRR  Respiratory: CTA B/L; no W/R/R  Gastrointestinal: soft, NT/ND; +BSx4  Extremities: WWP; no edema, clubbing or cyanosis  Vascular: 2+ radial pulses B/L  Neurological: AAOx3; no focal deficits  Psychiatric: pleasant mood and affect  Dermatologic: no appreciable wounds or damage to the skin    VITAL SIGNS:  Vital Signs Last 24 Hrs  T(C): 36.4 (2025 05:28), Max: 36.9 (2025 18:40)  T(F): 97.6 (2025 05:28), Max: 98.5 (2025 18:40)  HR: 101 (2025 05:28) (93 - 130)  BP: 100/62 (2025 05:28) (86/49 - 159/114)  BP(mean): 77 (2025 23:38) (10 - 85)  RR: 18 (2025 05:28) (15 - 21)  SpO2: 95% (2025 05:28) (94% - 99%)    Parameters below as of 2025 05:28  Patient On (Oxygen Delivery Method): room air          MEDICATIONS:  MEDICATIONS  (STANDING):  acetaminophen   IVPB .. 600 milliGRAM(s) IV Intermittent once  atorvastatin 10 milliGRAM(s) Oral at bedtime  donepezil 10 milliGRAM(s) Oral at bedtime  levothyroxine 25 MICROGram(s) Oral daily  memantine 10 milliGRAM(s) Oral every 24 hours  piperacillin/tazobactam IVPB.. 3.375 Gram(s) IV Intermittent every 8 hours  QUEtiapine 25 milliGRAM(s) Oral every 24 hours  sodium chloride 0.9%. 1000 milliLiter(s) (150 mL/Hr) IV Continuous <Continuous>  traZODone 50 milliGRAM(s) Oral every 24 hours  vancomycin  IVPB 500 milliGRAM(s) IV Intermittent every 24 hours    MEDICATIONS  (PRN):      ALLERGIES:  Allergies    No Known Allergies    Intolerances        LABS:                        9.1    17.44 )-----------( 272      ( 2025 18:42 )             28.8         138  |  104  |  37[H]  ----------------------------<  133[H]  4.1   |  25  |  0.54    Ca    6.9[L]      2025 13:12  Phos  3.5       Mg     1.8         TPro  4.5[L]  /  Alb  1.8[L]  /  TBili  0.5  /  DBili  x   /  AST  23  /  ALT  14  /  AlkPhos  73      PT/INR - ( 2025 18:42 )   PT: 11.6 sec;   INR: 0.99          PTT - ( 2025 18:42 )  PTT:26.1 sec  Urinalysis Basic - ( 2025 14:04 )    Color: Yellow / Appearance: Clear / S.029 / pH: x  Gluc: x / Ketone: Negative mg/dL  / Bili: Negative / Urobili: 0.2 mg/dL   Blood: x / Protein: Trace mg/dL / Nitrite: Positive   Leuk Esterase: Negative / RBC: 3 /HPF / WBC 5 /HPF   Sq Epi: x / Non Sq Epi: 2 /HPF / Bacteria: Few /HPF      CAPILLARY BLOOD GLUCOSE          RADIOLOGY & ADDITIONAL TESTS: Reviewed.

## 2025-01-08 NOTE — DIETITIAN INITIAL EVALUATION ADULT - PERTINENT MEDS FT
MEDICATIONS  (STANDING):  acetaminophen   IVPB .. 600 milliGRAM(s) IV Intermittent once  atorvastatin 10 milliGRAM(s) Oral at bedtime  donepezil 10 milliGRAM(s) Oral at bedtime  levothyroxine 25 MICROGram(s) Oral daily  memantine 10 milliGRAM(s) Oral every 24 hours  piperacillin/tazobactam IVPB.. 3.375 Gram(s) IV Intermittent every 8 hours  QUEtiapine 25 milliGRAM(s) Oral every 24 hours  sodium chloride 0.9%. 1000 milliLiter(s) (150 mL/Hr) IV Continuous <Continuous>  traZODone 50 milliGRAM(s) Oral every 24 hours  vancomycin  IVPB 500 milliGRAM(s) IV Intermittent every 24 hours    MEDICATIONS  (PRN):

## 2025-01-08 NOTE — PROGRESS NOTE ADULT - PROBLEM SELECTOR PLAN 9
F: none  E: replete as needed  N: tube feeds  VTE ppx: holding iso bleeding  GI ppx: none   Dispo: F  Code status: full

## 2025-01-08 NOTE — DISCHARGE NOTE NURSING/CASE MANAGEMENT/SOCIAL WORK - FINANCIAL ASSISTANCE
[facility] provides services at a reduced cost to those who are determined to be eligible through [facility]’s financial assistance program. Information regarding [facility]’s financial assistance program can be found by going to [email or web address] or by calling [phone].

## 2025-01-08 NOTE — DIETITIAN INITIAL EVALUATION ADULT - NS FNS DIET ORDER
Diet, NPO with Tube Feed:   Tube Feeding Modality: Gastrostomy  Jevity 1.2 Roman (JEVITY1.2RTH)  Total Volume for 24 Hours (mL): 799  Total Number of Cans: 4  Continuous  Starting Tube Feed Rate {mL per Hour}: 47  Until Goal Tube Feed Rate (mL per Hour): 47  Tube Feed Duration (in Hours): 17  Tube Feed Start Time: 11:00  Tube Feed Stop Time: 05:00 (01-07-25 @ 15:11)

## 2025-01-08 NOTE — DIETITIAN INITIAL EVALUATION ADULT - OTHER CALCULATIONS
Estimated needs based on dosing wt as within % IBW 110lb/50kg (95%). Needs adjusted for age, wound healing, and clinical status.

## 2025-01-08 NOTE — DIETITIAN INITIAL EVALUATION ADULT - ADD RECOMMEND
1. As medically feasible, recommend transition to home tube feed regimen: Jevity 1.2 x5 237mL (=x1 can) feeds daily to provide 1185mL total volume, 1422kcal (30kcal/kg dosing wt 47.7kg), 66g protein (1.4g/kg dosing wt 47.7kg), and 956mL free water.   >>Defer free water flushes to team. Recommend at least 30mL free water flush before and after each tube feed bolus for line patency; increase as need for hydration demands.   >>Consider bolus regimen: 474mL at 09:00, 237mL at 13:00, 474mL at 17:00.  >>Monitor GI tolerance & maintain aspiration precautions. RD to remain available to adjust EN regimen prn.   2. Monitor weight trends, labs, skin integrity, & hydration status.  3. Pain and bowel regimens per team.  4. RD remains available for dietary education/intervention prn.

## 2025-01-08 NOTE — DISCHARGE NOTE PROVIDER - NSDCMRMEDTOKEN_GEN_ALL_CORE_FT
Aricept ODT 10 mg oral tablet, disintegratin tab(s) orally once a day (at bedtime)  cefuroxime 500 mg oral tablet: 1 tab(s) orally every 12 hours  levothyroxine 25 mcg (0.025 mg) oral capsule: 1 cap(s) orally once a day  Lipitor 10 mg oral tablet: 1 tab(s) orally once a day  memantine 10 mg oral tablet: 2 tab(s) orally once a day  QUEtiapine 25 mg oral tablet: 1 tab(s) orally once a day  traZODone 50 mg oral tablet: 1 orally once a day

## 2025-01-08 NOTE — DISCHARGE NOTE PROVIDER - NSDCCPCAREPLAN_GEN_ALL_CORE_FT
PRINCIPAL DISCHARGE DIAGNOSIS  Diagnosis: Pressure ulcer  Assessment and Plan of Treatment: You have a chronic pressure ulcer on your right hip that started bleeding after it was cleaned by provider. You bleeding caused you to have low hemoglobin levels called anemia that required blood transfusion. Your wound also resulted in an infection in your blood and you were admitted to the hospital to receive antibiotics. We are discharging you with antiobiotics, Ceftin 500mg every 12 hours for 7 days through your PEG tube. It is important that you take these antibiotics as prescribed and complete the full course, even if you are feeling better. Please follow up with your PCP within 2 weeks of discharge from the hospital.      SECONDARY DISCHARGE DIAGNOSES  Diagnosis: Anemia due to acute blood loss  Assessment and Plan of Treatment: When you came to the hospital you were found to have anemia.   Anemia is the medical term for when a person has too few red blood cells. Red blood cells carry oxygen throughout your body; if you have too few red blood cells, your body is not able to get all the oxygen it needs. Most people with anemia have no symptoms, however common symptoms include: increased fatigue, shortness of breath, tiring easily, and headaches.  You received 2 units of blood and your blood levels responded to the transfusion.   If you experience any symptoms, or have any episodes of bloody vomit or bloody stool (can be red stool or black stool), please see your primary care provider or go to your emergency room for further evaluation. Please follow up with your PCP.     PRINCIPAL DISCHARGE DIAGNOSIS  Diagnosis: Pressure ulcer  Assessment and Plan of Treatment: You have a chronic pressure ulcer on your right hip that started bleeding after it was cleaned by provider. You bleeding caused you to have low hemoglobin levels called anemia that required blood transfusion. Your wound also resulted in an infection in your blood and you were admitted to the hospital to receive antibiotics. We are discharging you with antiobiotics, Ceftin 500mg every 12 hours for 7 days through your PEG tube. It is important that you take these antibiotics as prescribed and complete the full course, even if you are feeling better. Please follow up with your PCP within 2 weeks of discharge from the hospital.  Please follow these instructions to clean your wound properly:   Wet and dry Kerlix packing daily. Secure with clean gauze/abdominal pad with tape on top.      SECONDARY DISCHARGE DIAGNOSES  Diagnosis: Anemia due to acute blood loss  Assessment and Plan of Treatment: When you came to the hospital you were found to have anemia.   Anemia is the medical term for when a person has too few red blood cells. Red blood cells carry oxygen throughout your body; if you have too few red blood cells, your body is not able to get all the oxygen it needs. Most people with anemia have no symptoms, however common symptoms include: increased fatigue, shortness of breath, tiring easily, and headaches.  You received 2 units of blood and your blood levels responded to the transfusion.   If you experience any symptoms, or have any episodes of bloody vomit or bloody stool (can be red stool or black stool), please see your primary care provider or go to your emergency room for further evaluation. Please follow up with your PCP.

## 2025-01-08 NOTE — DIETITIAN INITIAL EVALUATION ADULT - PERTINENT LABORATORY DATA
01-08    141  |  107  |  26[H]  ----------------------------<  102[H]  3.8   |  28  |  0.44[L]    Ca    8.0[L]      08 Jan 2025 05:30  Phos  2.8     01-08  Mg     2.0     01-08    TPro  4.5[L]  /  Alb  1.8[L]  /  TBili  0.5  /  DBili  x   /  AST  23  /  ALT  14  /  AlkPhos  73  01-07  A1C with Estimated Average Glucose Result: 5.7 % (08-06-24 @ 06:08)

## 2025-01-08 NOTE — DIETITIAN INITIAL EVALUATION ADULT - EDUCATION DIETARY MODIFICATIONS
Discussed resumption of bolus feeds as tolerated and per MD discretion. Reviewed signs of tolerance to monitor. Pt's caregiver aware RD remains available for additional questions/concerns./(2) meets goals/outcomes/verbalization

## 2025-01-08 NOTE — DISCHARGE NOTE PROVIDER - CARE PROVIDER_API CALL
Alejandro Loza  Gastroenterology  [address], [address] [address]  Phone: [phone]  Fax: [phone]  Follow Up Time: 2 weeks

## 2025-01-08 NOTE — DIETITIAN INITIAL EVALUATION ADULT - NSFNSGIIOFT_GEN_A_CORE
No   01-07-25 @ 07:01  -  01-08-25 @ 07:00  --------------------------------------------------------  OUT:  Total OUT: 0 mL    Total NET: 235 mL

## 2025-01-08 NOTE — DIETITIAN INITIAL EVALUATION ADULT - NSFNSPHYEXAMSKINFT_GEN_A_CORE
Pressure Injury 1: Right:, hip, Unstageable  Pressure Injury 2: Left:, hip , Unstageable  Pressure Injury 3: Left:, medial, back , Unstageable  Pressure Injury 4: sacrum , Unstageable  Pressure Injury 5: Left:, lateral, proximal, foot, Unstageable  Pressure Injury 6: Left:, lateral, distal, Unstageable  Pressure Injury 7: Left:, foot bunion , Stage I  Pressure Injury 8: Right:, posterior, ankle , Suspected deep tissue injury  Pressure Injury 9: Left:, heel, Suspected deep tissue injury

## 2025-01-08 NOTE — DISCHARGE NOTE NURSING/CASE MANAGEMENT/SOCIAL WORK - PATIENT PORTAL LINK FT
You can access the FollowMyHealth Patient Portal offered by [facility] by registering at the following website: [email or web address]. By joining [facility]’s FollowMyHealth portal, you will also be able to view your health information using other applications (apps) compatible with our system.

## 2025-01-08 NOTE — PROGRESS NOTE ADULT - ASSESSMENT
89F PMH dementia (axo at baseline), HTN, HLD, hypothyroidism, s/p EGD PEG 2/2 dysphagia, acute metabolic encephalopathy (medicine 8/6/24-8/9/24), PSH of DEAN mauricio (with Dr. Restrepo, JUly 2019), presenting for bleeding right hip pressure wound after debridement with wound care at home the day prior. Now PPD1 s/p suture ligation of oozing vessels.   Wound now hemostatic. No evidence of purulence indicating infected pressure wound.    - No acute surgical intervention indicated  - Surgery Team 4C will sign off. Please call back with any questions/concerns
Hgb better post transfusion  no further bleed, dressings intact  can discharge on Ceftin  via peg  community doctor to continue her care  I will follow up cultures and communicate with homecare team
 89F with PMHx of dementia AOx0, HTN, HLD, hypothyroidism, PEG tube 2/2 dysphagia, and chronic wounds presenting with bleeding from right hip prsesure wound s/p debridement at home with wound care on 1/6, admitted for severe anemia and severe sepsis. Wound closed in ED and ICU consulted, patient cleared for RMF.

## 2025-01-08 NOTE — PROGRESS NOTE ADULT - PROBLEM SELECTOR PLAN 4
PEG placed by Dr. Loza iso dysphagia 2/2 dementia and failure to thrive. In place and ready to resume tube feeding once on RMF.   -tube feeds - Jevity 1.2 with goal rate of 47 ml/hr with LPS x1/day from 6471-7200 to provide 846 ml tube feed, 1115 calories, 62 g Prot., and 683 ml free water  -nutrition consult  -aspiration precautions

## 2025-01-08 NOTE — DISCHARGE NOTE PROVIDER - INSTRUCTIONS
Jevity 1.2 with goal rate of 47 ml/hr with LPS x1/day from 2145-7629 to provide 846 ml tube feed, 1115 calories, 62 g Prot., and 683 ml free water

## 2025-01-12 LAB
CULTURE RESULTS: SIGNIFICANT CHANGE UP
CULTURE RESULTS: SIGNIFICANT CHANGE UP
SPECIMEN SOURCE: SIGNIFICANT CHANGE UP
SPECIMEN SOURCE: SIGNIFICANT CHANGE UP

## 2025-01-17 NOTE — DIETITIAN INITIAL EVALUATION ADULT - PHYSICAL ASSESSMENT TEMPLES
severe

## 2025-01-23 DIAGNOSIS — I87.2 VENOUS INSUFFICIENCY (CHRONIC) (PERIPHERAL): ICD-10-CM
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
## 2025-01-23 DIAGNOSIS — L89.224 PRESSURE ULCER OF LEFT HIP, STAGE 4: ICD-10-CM

## 2025-01-23 DIAGNOSIS — E03.9 HYPOTHYROIDISM, UNSPECIFIED: ICD-10-CM

## 2025-01-23 DIAGNOSIS — Z79.890 HORMONE REPLACEMENT THERAPY: ICD-10-CM

## 2025-01-23 DIAGNOSIS — L89.154 PRESSURE ULCER OF SACRAL REGION, STAGE 4: ICD-10-CM

## 2025-01-23 DIAGNOSIS — F03.90 UNSPECIFIED DEMENTIA, UNSPECIFIED SEVERITY, WITHOUT BEHAVIORAL DISTURBANCE, PSYCHOTIC DISTURBANCE, MOOD DISTURBANCE, AND ANXIETY: ICD-10-CM

## 2025-01-23 DIAGNOSIS — G93.41 METABOLIC ENCEPHALOPATHY: ICD-10-CM

## 2025-01-23 DIAGNOSIS — R13.10 DYSPHAGIA, UNSPECIFIED: ICD-10-CM

## 2025-01-23 DIAGNOSIS — A41.9 SEPSIS, UNSPECIFIED ORGANISM: ICD-10-CM
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
## 2025-01-23 DIAGNOSIS — L76.32 POSTPROCEDURAL HEMATOMA OF SKIN AND SUBCUTANEOUS TISSUE FOLLOWING OTHER PROCEDURE: ICD-10-CM

## 2025-01-23 DIAGNOSIS — T81.19XA OTHER POSTPROCEDURAL SHOCK, INITIAL ENCOUNTER: ICD-10-CM

## 2025-01-23 DIAGNOSIS — R65.20 SEVERE SEPSIS WITHOUT SEPTIC SHOCK: ICD-10-CM

## 2025-01-23 DIAGNOSIS — L89.214 PRESSURE ULCER OF RIGHT HIP, STAGE 4: ICD-10-CM

## 2025-01-23 DIAGNOSIS — E78.5 HYPERLIPIDEMIA, UNSPECIFIED: ICD-10-CM

## 2025-01-23 DIAGNOSIS — Z93.1 GASTROSTOMY STATUS: ICD-10-CM

## 2025-01-23 DIAGNOSIS — D62 ACUTE POSTHEMORRHAGIC ANEMIA: ICD-10-CM

## 2025-01-23 DIAGNOSIS — Z74.01 BED CONFINEMENT STATUS: ICD-10-CM

## 2025-01-23 DIAGNOSIS — I10 ESSENTIAL (PRIMARY) HYPERTENSION: ICD-10-CM

## 2025-04-05 NOTE — H&P ADULT - NSHPLABSRESULTS_GEN_ALL_CORE
.  LABS:                         7.7    23.53 )-----------( 344      ( 05 Apr 2025 03:49 )             26.6     04-05    145  |  99  |  44[H]  ----------------------------<  136[H]  5.0   |  37[H]  |  0.55    Ca    8.7      05 Apr 2025 00:39    TPro  6.2  /  Alb  2.6[L]  /  TBili  0.2  /  DBili  x   /  AST  20  /  ALT  14  /  AlkPhos  78  04-05    PT/INR - ( 05 Apr 2025 00:39 )   PT: 11.6 sec;   INR: 1.01          PTT - ( 05 Apr 2025 00:39 )  PTT:31.9 sec  Urinalysis Basic - ( 05 Apr 2025 00:39 )    Color: x / Appearance: x / SG: x / pH: x  Gluc: 136 mg/dL / Ketone: x  / Bili: x / Urobili: x   Blood: x / Protein: x / Nitrite: x   Leuk Esterase: x / RBC: x / WBC x   Sq Epi: x / Non Sq Epi: x / Bacteria: x        Lactate, Blood: 4.4 mmol/L (04-05 @ 03:49)  Lactate, Blood: 1.3 mmol/L (04-05 @ 00:39)      RADIOLOGY, EKG & ADDITIONAL TESTS: Reviewed.

## 2025-04-05 NOTE — DIETITIAN INITIAL EVALUATION ADULT - NUTRITIONGOAL OUTCOME1
Pt to consistently meet >75% nutrient needs via most appropriate route for nutrition.   Maintain nutrition within goals of care.

## 2025-04-05 NOTE — CONSULT NOTE ADULT - ASSESSMENT
Patient is an 88 yo F w PMHx severe Alzheimer's dementia  (AAOX0 at baseline), cataracts, hypothyroidism, HLD, DAYSI, SVT, chronic pressure wounds, dysphagia (s/p PEG), s/p cardiorespiratory arrest 2/2 acute respiratory failure, sepsis, currently intubated, under ICU level of care. 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 Prognosis guarded.     Recommendations:     - c/w VEEG  - s/p Keppra 2 g load  - c/w Keppra 500 mg IV q12h starting this PM  - GOC conversation w family, palliative care involvement      Discussed w attending Dr. Naik

## 2025-04-05 NOTE — PROCEDURE NOTE - ADDITIONAL PROCEDURE DETAILS
L Radial arterial line placed under ultrasound guidance x 1 attempt. Placement confirmed via arterial wave tracing and pulsatile flow.

## 2025-04-05 NOTE — PROCEDURE NOTE - NSPOSTCAREGUIDE_GEN_A_CORE
Care for catheter as per unit/ICU protocols Verbal/written post procedure instructions were given to patient/caregiver/Keep the cast/splint/dressing clean and dry/Care for catheter as per unit/ICU protocols

## 2025-04-05 NOTE — H&P ADULT - NSHPPHYSICALEXAM_GEN_ALL_CORE
VITAL SIGNS:  T(C): 37.8 (04-05-25 @ 00:37), Max: 37.8 (04-05-25 @ 00:37)  T(F): 100 (04-05-25 @ 00:37), Max: 100 (04-05-25 @ 00:37)  HR: 89 (04-05-25 @ 03:11) (89 - 121)  BP: 110/62 (04-05-25 @ 00:37) (110/62 - 110/62)  BP(mean): --  RR: 23 (04-05-25 @ 00:37) (23 - 23)  SpO2: 100% (04-05-25 @ 03:11) (77% - 100%)  Wt(kg): --    PHYSICAL EXAM:  General: intubated and sedated, cachectic appearing  NAD; well-groomed; well-developed; AAO x 4; good concentration   Neurologic: AAOx4; CNII-XII grossly intact; no focal deficits; speech clear; +2/4 DTR in UE and LE b/l; light touch sensation intact of UE and le b/l;  negative Romberg test; no pronator drift; intact tandem gait; intact heel and toe walking; intact finger to nose testing; intact rapid alternating movements  Head: NC/AT  Eyes: PERRL, EOMI, no scleral icterus; no nystagmus  ENT: no nasal discharge; uvula midline, no oropharyngeal erythema or exudates; MMM; good dentition   Neck: supple; no thyromegaly  Respiratory: CTA b/l; normal effort; no accesory muscle use; symmetric inhalation and exhalation; vesicular breath sounds; no WWR; normal tactile fremitus; persussion resonant  Cardiac: RRR; +s1 and s2; no MRG (or grade 2 _ murmur appreciated at _ ICS); non displaced PMI; no JVD  Gastrointestinal: nondistended; nontender; no rebound or guarding; normal bowel sounds [id number]; percussion typmanic; no organomegaly   Back: spine midline, no bony tenderness or step-offs; no CVAT B/L  Extremities:  no clubbing, cyanosis, or edema b/l, capillary refill <2 sec b/l  Musculoskeletal: NROM x4; normal tone; no joint swelling, +5/5 strength in upper and lower extremities b/l   Vascular: 2+ radial, femoral, DP/PT pulses B/L  Dermatologic: skin warm, dry and intact; no skin tenting, rashes, wounds, or scars  Lymphatic: no submandibular or cervical LAD  Psychiatric: affect and characteristics of appearance, verbalizations, behaviors are appropriate VITAL SIGNS:  T(C): 37.8 (04-05-25 @ 00:37), Max: 37.8 (04-05-25 @ 00:37)  T(F): 100 (04-05-25 @ 00:37), Max: 100 (04-05-25 @ 00:37)  HR: 89 (04-05-25 @ 03:11) (89 - 121)  BP: 110/62 (04-05-25 @ 00:37) (110/62 - 110/62)  BP(mean): --  RR: 23 (04-05-25 @ 00:37) (23 - 23)  SpO2: 100% (04-05-25 @ 03:11) (77% - 100%)  Wt(kg): --    PHYSICAL EXAM:  General: intubated, cachectic appearing  Neurologic: AAOx0; not following commands  Head: NC/AT  Eyes: PERRL  ENT: no nasal discharge; uvula midline, dry MM  Respiratory: intubated on mech vent  Cardiac: RRR; +s1 and s2; no MRG   Gastrointestinal: nondistended; nontender; PEG without erythema; normal bowel sounds [id number]   Back: pressure wound near T6, sacrum, and R hip   Extremities:  no clubbing, cyanosis, or edema  Vascular: 2+ radial, femoral, DP/PT pulses B/L VITAL SIGNS:  T(C): 37.8 (04-05-25 @ 00:37), Max: 37.8 (04-05-25 @ 00:37)  T(F): 100 (04-05-25 @ 00:37), Max: 100 (04-05-25 @ 00:37)  HR: 89 (04-05-25 @ 03:11) (89 - 121)  BP: 110/62 (04-05-25 @ 00:37) (110/62 - 110/62)  BP(mean): --  RR: 23 (04-05-25 @ 00:37) (23 - 23)  SpO2: 100% (04-05-25 @ 03:11) (77% - 100%)  Wt(kg): --    PHYSICAL EXAM:  General: intubated, cachectic appearing  Neurologic: AAOx0; not following commands  Head: NC/AT  Eyes: PERRL  ENT: no nasal discharge; uvula midline, dry MM  Respiratory: intubated on mech vent  Cardiac: RRR; +s1 and s2; no MRG   Gastrointestinal: nondistended; nontender; PEG without erythema; normal bowel sounds [id number]   Back: pressure wound near T6, sacrum (purulence), and R hip   Extremities:  no clubbing, cyanosis, or edema  Vascular: 2+ radial, femoral, DP/PT pulses B/L

## 2025-04-05 NOTE — ED PROVIDER NOTE - PROGRESS NOTE DETAILS
Pt coded, likely aspiration pneumonia. Resuscitated with 4 rounds of CPR/epi and intubation, ROSC achieved. Admitted to ICU.

## 2025-04-05 NOTE — CONSULT NOTE ADULT - ASSESSMENT
Patient is an 89 yo F w PMHx Alzheimers dementia, cataracts, hypothyroidism, HLD, DAYSI, SVT, chronic pressure wounds, dysphagia (s/p PEG) who presented to [facility] ED iso fevers and hypoxia at home, patient had AHRF, coded and had CPR x4 with ROSC And intubated and transferred to MICU For further care. General surgery consulted due to findings of foci of air in subcutaneous tissue concenring for necrotizing soft tissue infection. At this given time low suspicion for NSTI based on exam and CT findings of foci of air is likely due to open wound and air tracking, pts exam was reassuring with no significant surrounding erythema, no fluctuance, no crepitus, no spreading cellulitis or induration.     PLAN  No acute surgical intervention needed  Consider consulting thoracic for apical hydropneumothorax   Continue with antibiotics for possible aspiration pneumonia   Unlikely necrotizing soft tissue infection and no indication for clindamycin at this given time  Patient is an 89 yo F w PMHx Alzheimers dementia, cataracts, hypothyroidism, HLD, DAYSI, SVT, chronic pressure wounds, dysphagia (s/p PEG) who presented to [facility] ED iso fevers and hypoxia at home, patient had AHRF, coded and had CPR x4 with ROSC And intubated and transferred to MICU For further care. General surgery consulted due to findings of foci of air in subcutaneous tissue concenring for necrotizing soft tissue infection. At this given time low suspicion for NSTI based on exam and CT findings of foci of air is likely due to open wound and air tracking, pts exam was reassuring with no significant surrounding erythema, no fluctuance, no crepitus, no spreading cellulitis or induration. Pts sepsis is unlikely from the chronic sacral wound, no concern for NSTI as this time, additionally, given frailty and significant comorbidities would be an ideal candidate for operative debridement.     PLAN  No acute surgical intervention needed  Consider consulting thoracic for apical hydropneumothorax   Continue with antibiotics for possible aspiration pneumonia   Unlikely necrotizing soft tissue infection and no indication for clindamycin at this given time   Recommend wound care consult for local wound care   General surgery will sign off

## 2025-04-05 NOTE — ED PROVIDER NOTE - HIV OFFER
Previously Declined (within the last year) Propranolol Counseling:  I discussed with the patient the risks of propranolol including but not limited to low heart rate, low blood pressure, low blood sugar, restlessness and increased cold sensitivity. They should call the office if they experience any of these side effects.

## 2025-04-05 NOTE — PROGRESS NOTE ADULT - ASSESSMENT
Patient is an 88 yo F w PMHx Alzheimers dementia, cataracts, hypothyroidism, HLD, DAYSI, SVT, chronic pressure wounds, dysphagia (s/p PEG) who presented to [facility] ED iso AMS and hypoxia, found to have AHRF pna and s/p code iso hypoxia 2/2 mucus plug vs aspiration    Neuro   #acute metabolic encephalopathy  #AD  Baseline mental status AAOX0, nonsensical speech.  Today, patient was more lethargic and with increasing O2 requirements, prompting them to seek medical attention   Home meds: trazadone 50mg BID, Aricept 10mg, seroquel 50mg, memantine 10mg BID  On PE patient AAOx0, not following commands  Multifactorial iso sepsis, hypoxia w poor baseline mental status   Patient not on sedation   Plan  - f/up TSH, NH3 and ABG  - f/up CTH  - tx pna as below  - holding home meds for now     #FTT  Patient with physical frailty, disability (relies on family and HHA w all ADLs), impaired neuropsychiatric function (AD, baseline AAOx0)  Plan  - OT pending mental status improvement  - nutrition consult  - per HCP full code    Pulm   #AHRF  #ongoing pna  #mucus plugging  Patient w increased secretions and fever at home and was being treated for pna w Cefpodoxime (had not finished 10d course) and Doxycycline (completed 5d course). Patient developed fevers on abx (Tmax 101) w increasing O2 requirements (at baseline, uses 1L NC during sleep), prompting them to seek medical attention.   In ED, patient was saturating mid 70s on RA> 93% on NC>90s on HFNC. As per ED, pt became acutely hypoxic in which they believe to be 2/2 mucus plugging vs aspiration and coded s/p intubation and 4 rounds of CPR with ROSC obtained.   mechanical ventilation settings in ED:  VCAC/18/400/5/80  Plan  [facility]  - holding tube feeds  - repeat ABG  - f/up CT chest  - f/up MRSA, extended RVP, urine leg and strep   - f/up procal  -  thiamine 200mg IV for 5 d  - collect tracheal aspirate   - c/w abx: vanc, zosyn, azithromycin     Cards  #septic shock   Patient meeting 4/4 SIRS criteria (T 101 at home, , RR 23, WBC 14.57) + lung source (pna) vs MSK (pressure wounds)  s/p cardiac arrest, now requiring pressors   Plan  - Monitor WBC  - Monitor for fevers  - f/u BCx   - TTE  - c/w levophed wean for MAP goal >65    #narrow complex tachycardia  #hypotension  Home meds: Midodrine 10mg q6 hrs prn, Metoprolol 12.5mg BID, Digoxin 0.125mg BID, ASA 81mg  Of note, approx 10 d ago, patient went into narrow complex tachycardia (HR 170s) and hypotension (70/20) in which patient was given fluids (gatorade), digoxin, and levophed at home with improvement in sx (home doctors: Dr Kush Maya).  Patient currently on levophed w HR 50  Plan  - f/up EKG  - c/w levophed  - holding midodrine, digoxin and toprol for now    #HLD   home meds: Lipitor 10mg  Plan  - c/w home meds    GI  #PEG  Patient w PEG tube placed iso dysphagia. Patient w c/f asp pna  Plan  - holding tube feeds iso c/f aspiration   - nutrition consulted   - FSGq6 hrs      strict I/Os    Renal  Given code , will check CPK  Given cachectic appearance, will check cystatin c    Endo  #hypothyroidism  Home med levothyroxine 25 mcg  Plan  - c/w home med  - f/up TSH    #NPO-  check FSG q6 hrs     Heme  #DAYSI  Patient w known DAYSI. As per Stefano, was suppose to be started on ferrous sulfate.   Hgb on admission: Hgb 9, MCV 77  No active signs of bleeding   Plan  - f/up iron studies  - daily CBC  - maintain active T&S    #DVT ppx  Patient w acute drop in Hgb without evidence of bleed  Plan  - will hold lovenox for now  - c/w SCD    MSK  #pressure wounds  In January, pt w  bleeding R hip pressure wound after debridement with wound care at home the day prior, s/p suture ligation of oozing vessels with surgery   stage 4 R thigh, L thigh , and thoracic- As per Stefano, patient underwent L thigh skin graft 3 weeks ago   On PE, purulence noted in sacrum   c/f OM  Plan  - Wet and dry Kerlix packing daily. Secure with clean gauze/abdominal pad with tape on top.  - wound care consult  - f/up ESR and CRP  - f/up CT imaging given c/f OM     ID    #SSTI - rule out necrotizing infection  - per CT scan, patient has had chronic     #pna  #pressure wounds  Patient w increased secretions and fever at home and was being treated for pna w Cefpodoxime and Doxycycline. Patient developed fevers on abx (Tmax 101) w increasing O2 requirements (at baseline, uses 1L NC during sleep), prompting them to seek medical attention. In ED, patient was saturating mid 70s on RA> 93% on NC>90s on HFNC. As per ED, pt became acutely hypoxic in which they believe to be 2/2 mucus plugging vs aspiration - s/p code w intubation  In terms of pressure wounds, patient w chronic wounds as described below. On PE, sacral ulcer w purulence   Plan  - holding tube feeds  - f/up CT imaging  - f/up ESR and CRP  - f/up MRSA, RVP, urine leg and strep   - f/up procal  - collect tracheal asp  - c/w abx: vanc (750q12, vanc trough prior to 4th dose), zosyn (3.375q8 hrs), azithromycin (500mg IV q24 hrs)  - wound care consult    Patient to be admitted to MICU  Plan discussed with Dr. Goodson

## 2025-04-05 NOTE — DIETITIAN INITIAL EVALUATION ADULT - ETIOLOGY
From: Meghan Martel  To: Madai Ng  Sent: 10/25/2022 2:05 PM CDT  Subject: 2022 December Mammo    Is there an order for me to get my December 2022 Mammogram?  Please let me know if I am OK to call for an appointment.  Thank you,  Meghan Martel  
mammo order placed, pt aware.  
inability to meet nutrient demands via PO

## 2025-04-05 NOTE — CONSULT NOTE ADULT - ATTENDING COMMENTS
Chronic decubital and right hip ulcers.  No signs of active infection or necrotizing infection. Both wounds appear stable.  CT reviewed.  No need for surgical debridement at the moment.  Patient is overall in a very poor state of health and not progressing despite aggressive ICU care.  Would strongly recommend palliative service evaluation.   Please let us know if we can be of further assistance.

## 2025-04-05 NOTE — H&P ADULT - ATTENDING COMMENTS
Sixtofredy Meily  [id number]  As above, this is an 88 y/o cachetic  female with advance dementia, who is bedbound was being treated for PNA with septic shock, pressors was d/c but was hypoxic so was transferred to the ED.  At the NH she was receiving Cefpodoxime and doxy.  In the ED she was hypoxic, coded, had ACLS with ROSC in about 10 to 15 min, she was intubated and transferred to the ICU.  -acute metabolic encephalopathy with advanced dementia, not on sedatives  -Ongoing PNA (HCAP) with severe sepsis  -acute respiratory failure  -cardio pulmonary arrest with ROSC  -sepsis without septic shock, not on pressors  -multiple ulcers including a large sacral ulcer  -hypothyroidism  -chronic Iron deficiency anemia  -failure to thrive or severe protein calorie malnutrition  >She is full code.  >Palliative care for GOC  >c/w  with ventilator support  >ABG  >MAP >65 mmHg, she received septic dose IVF, f/u lactate, the last one was 1.3  >Empiric broad spectrum antibiotic  Please see above for the rest of the details.

## 2025-04-05 NOTE — H&P ADULT - HISTORY OF PRESENT ILLNESS
Patient is an 88 yo F w PMHx Alzheimers dementia, cataracts, hypothyroidism, HLD, DAYSI, SVT, chronic pressure wounds, dysphagia (s/p PEG) who presented to [facility] ED iso fevers and hypoxia at home. Per HCP (Stefano) for the past 2 weeks, patient has had increased secretions and fevers, in which she was started on Cefpodoxime (had not finished 10d course) and Doxycycline (completed 5d course). Of note, approx 10 d ago, patient went into narrow complex tachycardia (HR 170s) and hypotension (70/20) in which patient was given fluids (gatorade), digoxin, and levophed at home with improvement in sx (home doctors: Dr Kush Maya). Originally, patient was improving, however 5 days ago, patient developed fevers (Tmax 101) although on abx. Today, patient was more lethargic and with increasing O2 requirements, prompting them to seek medical attention. In ED, patient was saturating mid 70s on RA> 93% on NC>90s on HFNC. As per ED, pt became acutely hypoxic in which they believe to be 2/2 mucus plugging vs aspirated and coded s/p intubation and 4 rounds of CPR with ROSC. At this point ICU was consulted for further care.   Per Stefano, patient uses 1L NC at night. Baseline mental status AAOX0, nonsensical speech    In ED  VS T 100 (oral), , /62, RR 23, 77% on RA>93% on NC>90s on HFNC> mech vent  labs WBC 14.57, Hgb 9, MCV 77, , neut 85.5%, Na 145, K 5.0, HCO3 37, BUN 44, Cr 0.55, alb 2.6, alk phos 78, AST 20, ALT 14, lactate 1.3 (pre code)  limited RVP neg  imaging: CXR: LLL consolidaton  interventions: 1.3L IVF (NS), vanco 1g, zosyn 3.375, 675 mg tylenol

## 2025-04-05 NOTE — ED PROVIDER NOTE - CLINICAL SUMMARY MEDICAL DECISION MAKING FREE TEXT BOX
89F with PMHx of dementia AOx0, HTN, HLD, hypothyroidism, PEG tube 2/2 dysphagia, and chronic wounds, recent pna currently being treated with cefpodoxime and doxycycline here today for fevers, hypoxia, ronchi, increased lethargy at home.     Febrile, tachy, meeting SIRS criteria. Diff dx: sepsis 2/2 pna from UTI vs viral sx. Chronic wounds do not appear acutely infected.  plan for labs, empiric abx, NC for O2 support, admit

## 2025-04-05 NOTE — PROCEDURE NOTE - NSPROCDETAILS_GEN_ALL_CORE
location identified, draped/prepped, sterile technique used, needle inserted/introduced/positive blood return obtained via catheter/connected to a pressurized flush line/sutured in place/all materials/supplies accounted for at end of procedure location identified, draped/prepped, sterile technique used, needle inserted/introduced/positive blood return obtained via catheter/connected to a pressurized flush line/sutured in place/hemostasis with direct pressure, dressing applied/Seldinger technique/all materials/supplies accounted for at end of procedure

## 2025-04-05 NOTE — H&P ADULT - ASSESSMENT
Patient is an 89 yo F w PMHx Alzheimers dementia, cataracts, hypothyroidism, HLD, DAYSI, SVT, chronic pressure wounds, dysphagia (s/p PEG) who presented to [facility] ED iso AMS and hypoxia, found to have AHRF pna and s/p code iso hypoxia 2/2 mucus plug vs aspiration    Neuro   #acute metabolic encephalopathy  #AD  Baseline mental status AAOX0, nonsensical speech.  Today, patient was more lethargic and with increasing O2 requirements, prompting them to seek medical attention   Home meds: trazadone 50mg BID, Aricept 10mg, seroquel 50mg, memantine 10mg BID  On PE patient AAOx0, not following commands  Multifactorial iso sepsis, hypoxia  Patient not on sedation   Plan  - f/up TSH, NH3, ABG, CTH  - tx pna as below  - holding home meds for now     Pulm   #AHRF  #ongoing pna  #mucus plugging  Patient w increased secretions and fever at home and was being treated for pna w Cefpodoxime (had not finished 10d course) and Doxycycline (completed 5d course). Patient developed fevers on abx (Tmax 101) w increasing O2 requirements (at baseline, uses 1L NC during sleep), prompting them to seek medical attention.   In ED, patient was saturating mid 70s on RA> 93% on NC>90s on HFNC. As per ED, pt became acutely hypoxic in which they believe to be 2/2 mucus plugging vs aspiration and coded s/p intubation and 4 rounds of CPR with ROSC obtained.   Plan  - mechanical ventilation settings in ED:  VCAC/18/400/5/80  - holding tube feeds  - repeat ABG  - f/up CT chest  - f/up MRSA, RVP, urine leg and strep   - TTE, EKG, pantoprazole, thiamine    - f/up procal  - collect tracheal asp  - c/w abx: vanc, zosyn, azithromycin    Cards  #septic shock   Patient meeting 4/4 SIRS criteria (T 101 at home, , RR 23, WBC 14.57) + lung source   s/p cardiac arrest, now requiring pressors   Plan  - Monitor WBC  - Monitor for fevers  - f/u BCx   - c/w levophed wean for MAP goal >65    #narrow complex tachycardia  #hypotension  Home meds: Midodrine 10mg q6 hrs prn, Metoprolol 12.5mg BID, Digoxin 0.125mg BID, ASA 81mg  Of note, approx 10 d ago, patient went into narrow complex tachycardia (HR 170s) and hypotension (70/20) in which patient was given fluids (gatorade), digoxin, and levophed at home with improvement in sx (home doctors: Dr Kush Maya).  Patient currently on levophed w HR 50  Plan  - f/up EKG  - c/w levophed  - holding midodrine, digoxin and toprol for now    #HLD   home meds: Lipitor 10mg  Plan  - c/w home meds    GI  #PEG  Plan  - holding tube feeds iso c/f aspiration   - nutrition consulted       strict I/Os    Renal  check CPK     Endo  #hypothyroidism  Home med levothyroxine 25 mcg  Plan  - c/w home med    NPO- FSG q6 hrs     Heme  #DAYSI    ID  #pna  #pressure wounds  Plan    MSK  #pressure wounds  In January, pt w  bleeding R hip pressure wound after debridement with wound care at home the day prior, s/p suture ligation of oozing vessels with surgery   stage 4 L thigh, L thigh , and thoracic- As per Stefano, patient underwent L thigh skin graft 3 weeks ago   On PE, purulence noted in sacrum   c/f OM  Plan  - wound care consult  - f/up ESR and CRP  - f/up CT imaging given c/f OM     #FTT  Patient with physical frailty, disability (relies on family and HHA w all ADLs), impaired neuropsychiatric function (AD, baseline AAOx0)  Plan  - OT pending mental status improvement  - nutrition consult  - per HCP full code     Patient is an 88 yo F w PMHx Alzheimers dementia, cataracts, hypothyroidism, HLD, DAYSI, SVT, chronic pressure wounds, dysphagia (s/p PEG) who presented to [facility] ED iso AMS and hypoxia, found to have AHRF pna and s/p code iso hypoxia 2/2 mucus plug vs aspiration    Neuro   #acute metabolic encephalopathy  #AD  Baseline mental status AAOX0, nonsensical speech.  Today, patient was more lethargic and with increasing O2 requirements, prompting them to seek medical attention   Home meds: trazadone 50mg BID, Aricept 10mg, seroquel 50mg, memantine 10mg BID  On PE patient AAOx0, not following commands  Multifactorial iso sepsis, hypoxia w poor baseline mental status   Patient not on sedation   Plan  - f/up TSH, NH3 and ABG  - f/up CTH  - tx pna as below  - holding home meds for now     #FTT  Patient with physical frailty, disability (relies on family and HHA w all ADLs), impaired neuropsychiatric function (AD, baseline AAOx0)  Plan  - OT pending mental status improvement  - nutrition consult  - per HCP full code    Pulm   #AHRF  #ongoing pna  #mucus plugging  Patient w increased secretions and fever at home and was being treated for pna w Cefpodoxime (had not finished 10d course) and Doxycycline (completed 5d course). Patient developed fevers on abx (Tmax 101) w increasing O2 requirements (at baseline, uses 1L NC during sleep), prompting them to seek medical attention.   In ED, patient was saturating mid 70s on RA> 93% on NC>90s on HFNC. As per ED, pt became acutely hypoxic in which they believe to be 2/2 mucus plugging vs aspiration and coded s/p intubation and 4 rounds of CPR with ROSC obtained.   mechanical ventilation settings in ED:  VCAC/18/400/5/80  [facility]  - holding [facility] feeds  - repeat ABG  - f/up CT chest  - f/up MRSA, extended RVP, urine leg and strep   - f/up procal  -  thiamine 200mg IV for 5 d  - collect tracheal aspirate   - c/w abx: vanc, zosyn, azithromycin     Cards  #septic shock   Patient meeting 4/4 SIRS criteria (T 101 at home, , RR 23, WBC 14.57) + lung source (pna) vs MSK (pressure wounds)  s/p cardiac arrest, now requiring pressors   Plan  - Monitor WBC  - Monitor for fevers  - f/u BCx   - TTE  - c/w levophed wean for MAP goal >65    #narrow complex tachycardia  #hypotension  Home meds: Midodrine 10mg q6 hrs prn, Metoprolol 12.5mg BID, Digoxin 0.125mg BID, ASA 81mg  Of note, approx 10 d ago, patient went into narrow complex tachycardia (HR 170s) and hypotension (70/20) in which patient was given fluids (gatorade), digoxin, and levophed at home with improvement in sx (home doctors: Dr Kush Maya).  Patient currently on levophed w HR 50  Plan  - f/up EKG  - c/w levophed  - holding midodrine, digoxin and toprol for now    #HLD   home meds: Lipitor 10mg  Plan  - c/w home meds    GI  #PEG  Patient w PEG tube placed iso dysphagia. Patient w c/f asp pna  Plan  - holding tube feeds iso c/f aspiration   - nutrition consulted   - FSGq6 hrs      strict I/Os    Renal  Given code , will check CPK  Given cachectic appearance, will check cystatin c    Endo  #hypothyroidism  Home med levothyroxine 25 mcg  Plan  - c/w home med  - f/up TSH    #NPO-  check FSG q6 hrs     Heme  #DAYSI  Patient w known DAYSI. As per Stefano, was suppose to be started on ferrous sulfate.   Hgb on admission: Hgb 9, MCV 77  No active signs of bleeding   Plan  - f/up iron studies  - daily CBC  - maintain active T&S    #DVT ppx  Patient w acute drop in Hgb without evidence of bleed  Plan  - will hold lovenox for now  - c/w SCD    MSK  #pressure wounds  In January, pt w  bleeding R hip pressure wound after debridement with wound care at home the day prior, s/p suture ligation of oozing vessels with surgery   stage 4 R thigh, L thigh , and thoracic- As per Stefano, patient underwent L thigh skin graft 3 weeks ago   On PE, purulence noted in sacrum   c/f OM  Plan  - wound care consult  - f/up ESR and CRP  - f/up CT imaging given c/f OM     ID  #pna  #pressure wounds  Patient w increased secretions and fever at home and was being treated for pna w Cefpodoxime and Doxycycline. Patient developed fevers on abx (Tmax 101) w increasing O2 requirements (at baseline, uses 1L NC during sleep), prompting them to seek medical attention. In ED, patient was saturating mid 70s on RA> 93% on NC>90s on HFNC. As per ED, pt became acutely hypoxic in which they believe to be 2/2 mucus plugging vs aspiration - s/p code w intubation  In terms of pressure wounds, patient w chronic wounds as described below. On PE, sacral ulcer w purulence   Plan  - holding tube feeds  - f/up CT imaging  - f/up ESR and CRP  - f/up MRSA, RVP, urine leg and strep   - f/up procal  - collect tracheal asp  - c/w abx: vanc (750q12, vanc trough prior to 4th dose), zosyn (3.375q8 hrs), azithromycin (500mg IV q24 hrs)  - wound care consult    Patient to be admitted to MICU  Plan discussed with Dr. Goodson     Patient is an 88 yo F w PMHx Alzheimers dementia, cataracts, hypothyroidism, HLD, DAYSI, SVT, chronic pressure wounds, dysphagia (s/p PEG) who presented to [facility] ED iso AMS and hypoxia, found to have AHRF pna and s/p code iso hypoxia 2/2 mucus plug vs aspiration    Neuro   #acute metabolic encephalopathy  #AD  Baseline mental status AAOX0, nonsensical speech.  Today, patient was more lethargic and with increasing O2 requirements, prompting them to seek medical attention   Home meds: trazadone 50mg BID, Aricept 10mg, seroquel 50mg, memantine 10mg BID  On PE patient AAOx0, not following commands  Multifactorial iso sepsis, hypoxia w poor baseline mental status   Patient not on sedation   Plan  - f/up TSH, NH3 and ABG  - f/up CTH  - tx pna as below  - holding home meds for now     #FTT  Patient with physical frailty, disability (relies on family and HHA w all ADLs), impaired neuropsychiatric function (AD, baseline AAOx0)  Plan  - OT pending mental status improvement  - nutrition consult  - per HCP full code    Pulm   #AHRF  #ongoing pna  #mucus plugging  Patient w increased secretions and fever at home and was being treated for pna w Cefpodoxime (had not finished 10d course) and Doxycycline (completed 5d course). Patient developed fevers on abx (Tmax 101) w increasing O2 requirements (at baseline, uses 1L NC during sleep), prompting them to seek medical attention.   In ED, patient was saturating mid 70s on RA> 93% on NC>90s on HFNC. As per ED, pt became acutely hypoxic in which they believe to be 2/2 mucus plugging vs aspiration and coded s/p intubation and 4 rounds of CPR with ROSC obtained.   mechanical ventilation settings in ED:  VCAC/18/400/5/80  [facility]  - holding tube feeds  - repeat ABG  - f/up CT chest  - f/up MRSA, extended RVP, urine leg and strep   - f/up procal  -  thiamine 200mg IV for 5 d  - collect tracheal aspirate   - c/w abx: vanc, zosyn, azithromycin     Cards  #septic shock   Patient meeting 4/4 SIRS criteria (T 101 at home, , RR 23, WBC 14.57) + lung source (pna) vs MSK (pressure wounds)  s/p cardiac arrest, now requiring pressors   Plan  - Monitor WBC  - Monitor for fevers  - f/u BCx   - TTE  - c/w levophed wean for MAP goal >65    #narrow complex tachycardia  #hypotension  Home meds: Midodrine 10mg q6 hrs prn, Metoprolol 12.5mg BID, Digoxin 0.125mg BID, ASA 81mg  Of note, approx 10 d ago, patient went into narrow complex tachycardia (HR 170s) and hypotension (70/20) in which patient was given fluids (gatorade), digoxin, and levophed at home with improvement in sx (home doctors: Dr Kush Maya).  Patient currently on levophed w HR 50  Plan  - f/up EKG  - c/w levophed  - holding midodrine, digoxin and toprol for now    #HLD   home meds: Lipitor 10mg  Plan  - c/w home meds    GI  #PEG  Patient w PEG tube placed iso dysphagia. Patient w c/f asp pna  Plan  - holding tube feeds iso c/f aspiration   - nutrition consulted   - FSGq6 hrs      strict I/Os    Renal  Given code , will check CPK  Given cachectic appearance, will check cystatin c    Endo  #hypothyroidism  Home med levothyroxine 25 mcg  Plan  - c/w home med  - f/up TSH    #NPO-  check FSG q6 hrs     Heme  #DAYSI  Patient w known DAYSI. As per Stefano, was suppose to be started on ferrous sulfate.   Hgb on admission: Hgb 9, MCV 77  No active signs of bleeding   Plan  - f/up iron studies  - daily CBC  - maintain active T&S    #DVT ppx  Patient w acute drop in Hgb without evidence of bleed  Plan  - will hold lovenox for now  - c/w SCD    MSK  #pressure wounds  In January, pt w  bleeding R hip pressure wound after debridement with wound care at home the day prior, s/p suture ligation of oozing vessels with surgery   stage 4 R thigh, L thigh , and thoracic- As per Stefano, patient underwent L thigh skin graft 3 weeks ago   On PE, purulence noted in sacrum   c/f OM  Plan  - Wet and dry Kerlix packing daily. Secure with clean gauze/abdominal pad with tape on top.  - wound care consult  - f/up ESR and CRP  - f/up CT imaging given c/f OM     ID  #pna  #pressure wounds  Patient w increased secretions and fever at home and was being treated for pna w Cefpodoxime and Doxycycline. Patient developed fevers on abx (Tmax 101) w increasing O2 requirements (at baseline, uses 1L NC during sleep), prompting them to seek medical attention. In ED, patient was saturating mid 70s on RA> 93% on NC>90s on HFNC. As per ED, pt became acutely hypoxic in which they believe to be 2/2 mucus plugging vs aspiration - s/p code w intubation  In terms of pressure wounds, patient w chronic wounds as described below. On PE, sacral ulcer w purulence   Plan  - holding tube feeds  - f/up CT imaging  - f/up ESR and CRP  - f/up MRSA, RVP, urine leg and strep   - f/up procal  - collect tracheal asp  - c/w abx: vanc (750q12, vanc trough prior to 4th dose), zosyn (3.375q8 hrs), azithromycin (500mg IV q24 hrs)  - wound care consult    Patient to be admitted to MICU  Plan discussed with Dr. Goodson

## 2025-04-05 NOTE — PROGRESS NOTE ADULT - SUBJECTIVE AND OBJECTIVE BOX
SUBJECTIVE:  BERNARD RODRIGUEZ is doing well this morning. Today is hospital day .     24 Hour Events:   - No acute overnight events.      MEDICATIONS:  STANDING MEDICATIONS  atorvastatin 10 milliGRAM(s) Oral every 24 hours  azithromycin  IVPB 500 milliGRAM(s) IV Intermittent every 24 hours  chlorhexidine 0.12% Liquid 15 milliLiter(s) Oral Mucosa every 12 hours  chlorhexidine 2% Cloths 1 Application(s) Topical <User Schedule>  clindamycin IVPB 900 milliGRAM(s) IV Intermittent once  clindamycin IVPB 900 milliGRAM(s) IV Intermittent every 8 hours  clindamycin IVPB      hydrocortisone sodium succinate Injectable 50 milliGRAM(s) IV Push every 6 hours  levothyroxine 25 MICROGram(s) Oral every 24 hours  norepinephrine Infusion 0.05 MICROgram(s)/kG/Min IV Continuous <Continuous>  pantoprazole  Injectable 40 milliGRAM(s) IV Push every 24 hours  piperacillin/tazobactam IVPB.- 4.5 Gram(s) IV Intermittent once  piperacillin/tazobactam IVPB.- 4.5 Gram(s) IV Intermittent once  thiamine Injectable 200 milliGRAM(s) IV Push daily  vancomycin  IVPB 750 milliGRAM(s) IV Intermittent every 12 hours    PRN MEDICATIONS    VITALS:   ICU Vital Signs Last 24 Hrs  T(C): 37.6 (05 Apr 2025 06:04), Max: 37.8 (05 Apr 2025 00:37)  T(F): 99.7 (05 Apr 2025 06:04), Max: 100 (05 Apr 2025 00:37)  HR: 95 (05 Apr 2025 07:00) (51 - 121)  BP: 107/53 (05 Apr 2025 07:00) (105/53 - 129/60)  BP(mean): 74 (05 Apr 2025 07:00) (74 - 87)  ABP: --  ABP(mean): --  RR: 14 (05 Apr 2025 07:00) (14 - 26)  SpO2: 100% (05 Apr 2025 07:00) (77% - 100%)    O2 Parameters below as of 05 Apr 2025 07:00  Patient On (Oxygen Delivery Method): ventilator    O2 Concentration (%): 60      PHYSICAL EXAM  General: well appearing, in no acute distress    HEENT: NC/AT, sclera anicteric, conjunctiva clear, mucus membranes moist, no lymphadenopathy, no JVD appreciated   Lungs: anterior and posterior lung fields clear to auscultation bilaterally, no wheezes, rhonchi or rales   Heart: regular rate and rhythm, normal S1 S2, no murmurs/rubs/gallops    Abdomen: soft, non-tender, non-distended, bowel sounds present   Extremities: atraumatic, no lower extremity edema, clubbing or cyanosis, distal pulses 2+ at DP and radial bilaterally    Skin: normal skin texture and turgor without rashes or lesions, no diaphoresis   Neuro: A&Ox3, clear speech, facial features symmetrical, moving all extremities spontaneously, strength grossly intact, sensation intact to light touch at distal upper and lower extremities bilaterally     04-04-25 @ 07:01  -  04-05-25 @ 07:00  --------------------------------------------------------  IN: 56 mL / OUT: 250 mL / NET: -194 mL    04-05-25 @ 07:01  -  04-05-25 @ 08:31  --------------------------------------------------------  IN: 14 mL / OUT: 50 mL / NET: -36 mL      Last Bowel Movement: 05-Apr-2025 (04-05-25 @ 03:40)    Orthostatic VS      VENT DATA:  Mode: AC/ CMV (Assist Control/ Continuous Mandatory Ventilation), RR (machine): 18, TV (machine): 400, FiO2: 60, PEEP: 5, ITime: 1, MAP: 11, PIP: 25    LABS:                        7.7    23.53 )-----------( 344      ( 05 Apr 2025 03:49 )             26.6     04-05    140  |  101  |  43[H]  ----------------------------<  191[H]  3.8   |  26  |  0.56    Ca    7.5[L]      05 Apr 2025 03:49  Phos  3.6     04-05  Mg     2.0     04-05    TPro  5.0[L]  /  Alb  2.1[L]  /  TBili  0.3  /  DBili  x   /  AST  81[H]  /  ALT  44  /  AlkPhos  72  04-05    PT/INR - ( 05 Apr 2025 00:39 )   PT: 11.6 sec;   INR: 1.01          PTT - ( 05 Apr 2025 00:39 )  PTT:31.9 sec  Urinalysis Basic - ( 05 Apr 2025 03:49 )    Color: x / Appearance: x / SG: x / pH: x  Gluc: 191 mg/dL / Ketone: x  / Bili: x / Urobili: x   Blood: x / Protein: x / Nitrite: x   Leuk Esterase: x / RBC: x / WBC x   Sq Epi: x / Non Sq Epi: x / Bacteria: x      ABG - ( 05 Apr 2025 06:55 )  pH, Arterial: 7.53  pH, Blood: x     /  pCO2: 37    /  pO2: 175   / HCO3: 31    / Base Excess: 7.8   /  SaO2: 99.9              Lactate, Blood: 4.4 mmol/L *HH* (04-05-25 @ 03:49)  Sedimentation Rate, Erythrocyte: 45 mm/Hr *H* (04-05-25 @ 03:49)  Lactate, Blood: 1.3 mmol/L (04-05-25 @ 00:39)    Lines:  Central line:              Date placed:             Indication:   Intravenous Access:   NG tube:   Carnes Catheter:   Indwelling Urethral Catheter:     Connect To:  Straight Drainage/Gravity    Indication:  Urine Output Monitoring in Critically Ill (04-05-25 @ 05:11) (not performed) [Active]      Comprehensive Metabolic Panel: 09:00 (04-05-25 @ 05:18)  Complete Blood Count + Automated Diff: 09:00 (04-05-25 @ 05:18)  Troponin T, High Sensitivity: 09:00 (04-05-25 @ 05:18)  Type + Screen: 09:00 (04-05-25 @ 05:18)  Vancomycin Level, Trough: 12:00 (04-05-25 @ 04:41)  Cystatin-C with eGFR: 09:00 (04-05-25 @ 04:35)  Lactate, Blood: 09:00 (04-05-25 @ 04:35)  Culture - Sputum: Routine  Specimen Source: ET Tube (04-05-25 @ 03:49)     SUBJECTIVE:  BERNARD RODRIGUEZ is doing well this morning. Today is hospital day .     24 Hour Events:   - No acute overnight events.  - CT pelvis 4/5 "Soft tissue wound involving the the skin overlying most of the   sacrum/coccyx, similar to 1/7/2025. A few foci of soft tissue gas are   seen (, 14-76), cannot rule out necrotizing infection    MEDICATIONS:  STANDING MEDICATIONS  atorvastatin 10 milliGRAM(s) Oral every 24 hours  azithromycin  IVPB 500 milliGRAM(s) IV Intermittent every 24 hours  chlorhexidine 0.12% Liquid 15 milliLiter(s) Oral Mucosa every 12 hours  chlorhexidine 2% Cloths 1 Application(s) Topical <User Schedule>  clindamycin IVPB 900 milliGRAM(s) IV Intermittent once  clindamycin IVPB 900 milliGRAM(s) IV Intermittent every 8 hours  clindamycin IVPB      hydrocortisone sodium succinate Injectable 50 milliGRAM(s) IV Push every 6 hours  levothyroxine 25 MICROGram(s) Oral every 24 hours  norepinephrine Infusion 0.05 MICROgram(s)/kG/Min IV Continuous <Continuous>  pantoprazole  Injectable 40 milliGRAM(s) IV Push every 24 hours  piperacillin/tazobactam IVPB.- 4.5 Gram(s) IV Intermittent once  piperacillin/tazobactam IVPB.- 4.5 Gram(s) IV Intermittent once  thiamine Injectable 200 milliGRAM(s) IV Push daily  vancomycin  IVPB 750 milliGRAM(s) IV Intermittent every 12 hours    PRN MEDICATIONS    VITALS:   ICU Vital Signs Last 24 Hrs  T(C): 37.6 (05 Apr 2025 06:04), Max: 37.8 (05 Apr 2025 00:37)  T(F): 99.7 (05 Apr 2025 06:04), Max: 100 (05 Apr 2025 00:37)  HR: 95 (05 Apr 2025 07:00) (51 - 121)  BP: 107/53 (05 Apr 2025 07:00) (105/53 - 129/60)  BP(mean): 74 (05 Apr 2025 07:00) (74 - 87)  ABP: --  ABP(mean): --  RR: 14 (05 Apr 2025 07:00) (14 - 26)  SpO2: 100% (05 Apr 2025 07:00) (77% - 100%)    O2 Parameters below as of 05 Apr 2025 07:00  Patient On (Oxygen Delivery Method): ventilator    O2 Concentration (%): 60      PHYSICAL EXAM  General: well appearing, in no acute distress    HEENT: NC/AT, sclera anicteric, conjunctiva clear, mucus membranes moist, no lymphadenopathy, no JVD appreciated   Lungs: anterior and posterior lung fields clear to auscultation bilaterally, no wheezes, rhonchi or rales   Heart: regular rate and rhythm, normal S1 S2, no murmurs/rubs/gallops    Abdomen: soft, non-tender, non-distended, bowel sounds present   Extremities: atraumatic, no lower extremity edema, clubbing or cyanosis, distal pulses 2+ at DP and radial bilaterally    Skin: normal skin texture and turgor without rashes or lesions, no diaphoresis   Neuro: A&Ox3, clear speech, facial features symmetrical, moving all extremities spontaneously, strength grossly intact, sensation intact to light touch at distal upper and lower extremities bilaterally     04-04-25 @ 07:01  -  04-05-25 @ 07:00  --------------------------------------------------------  IN: 56 mL / OUT: 250 mL / NET: -194 mL    04-05-25 @ 07:01  -  04-05-25 @ 08:31  --------------------------------------------------------  IN: 14 mL / OUT: 50 mL / NET: -36 mL      Last Bowel Movement: 05-Apr-2025 (04-05-25 @ 03:40)    Orthostatic VS      VENT DATA:  Mode: AC/ CMV (Assist Control/ Continuous Mandatory Ventilation), RR (machine): 18, TV (machine): 400, FiO2: 60, PEEP: 5, ITime: 1, MAP: 11, PIP: 25    LABS:                        7.7    23.53 )-----------( 344      ( 05 Apr 2025 03:49 )             26.6     04-05    140  |  101  |  43[H]  ----------------------------<  191[H]  3.8   |  26  |  0.56    Ca    7.5[L]      05 Apr 2025 03:49  Phos  3.6     04-05  Mg     2.0     04-05    TPro  5.0[L]  /  Alb  2.1[L]  /  TBili  0.3  /  DBili  x   /  AST  81[H]  /  ALT  44  /  AlkPhos  72  04-05    PT/INR - ( 05 Apr 2025 00:39 )   PT: 11.6 sec;   INR: 1.01          PTT - ( 05 Apr 2025 00:39 )  PTT:31.9 sec  Urinalysis Basic - ( 05 Apr 2025 03:49 )    Color: x / Appearance: x / SG: x / pH: x  Gluc: 191 mg/dL / Ketone: x  / Bili: x / Urobili: x   Blood: x / Protein: x / Nitrite: x   Leuk Esterase: x / RBC: x / WBC x   Sq Epi: x / Non Sq Epi: x / Bacteria: x      ABG - ( 05 Apr 2025 06:55 )  pH, Arterial: 7.53  pH, Blood: x     /  pCO2: 37    /  pO2: 175   / HCO3: 31    / Base Excess: 7.8   /  SaO2: 99.9              Lactate, Blood: 4.4 mmol/L *HH* (04-05-25 @ 03:49)  Sedimentation Rate, Erythrocyte: 45 mm/Hr *H* (04-05-25 @ 03:49)  Lactate, Blood: 1.3 mmol/L (04-05-25 @ 00:39)    Lines:  Central line:              Date placed:             Indication:   Intravenous Access:   NG tube:   Carnes Catheter:   Indwelling Urethral Catheter:     Connect To:  Straight Drainage/Gravity    Indication:  Urine Output Monitoring in Critically Ill (04-05-25 @ 05:11) (not performed) [Active]      Comprehensive Metabolic Panel: 09:00 (04-05-25 @ 05:18)  Complete Blood Count + Automated Diff: 09:00 (04-05-25 @ 05:18)  Troponin T, High Sensitivity: 09:00 (04-05-25 @ 05:18)  Type + Screen: 09:00 (04-05-25 @ 05:18)  Vancomycin Level, Trough: 12:00 (04-05-25 @ 04:41)  Cystatin-C with eGFR: 09:00 (04-05-25 @ 04:35)  Lactate, Blood: 09:00 (04-05-25 @ 04:35)  Culture - Sputum: Routine  Specimen Source: ET Tube (04-05-25 @ 03:49)

## 2025-04-05 NOTE — PATIENT PROFILE ADULT - PHONE #
RN notified pt was found to be shaking. When going to assess the pt, the pt was no longer shaking. FS 72, BP 100s/50s. SpO2 97% on RA. He was answering questions appropriately, AOx3. His responses were sluggish and was apparently much more awake and active during the day. Pt seemed lethargic. Pt says he is not in pain or discomfort. he was able to follow commands but showed some weakness and was unable to hold up both of his arms for any sustained amount of time. Recently received oxy er 40 and dilaudid 0.5mg.   will do eeg and reduce dilaudid dose back to 0.5 from 1mg, which seems like he has not gotten yet.   if shaking is observed again, may consider neuro consult and repeat CTH, which was negative several days ago. RN notified pt was found to be shaking. When going to assess the pt, the pt was no longer shaking. FS 72, BP 100s/50s. SpO2 97% on RA. He was answering questions appropriately, AOx3. His responses were sluggish and was apparently much more awake and active during the day. Pt seemed lethargic. Pt says he is not in pain or discomfort. he was able to follow commands but showed some weakness and was unable to hold up both of his arms for any sustained amount of time. Recently received oxy er 40 and dilaudid 0.5mg.   will do eeg. will try to do IV tylenol for pain as much as possible prior to administering any further opiates. may give opiate only if pt is not lethargic. also reduced dilaudid dose back to 0.5 from 1mg, which seems like he has not gotten yet, and holding gabapentin for now.  if shaking is observed again, may consider neuro consult and repeat CTH, which was negative several days ago. [phone]

## 2025-04-05 NOTE — DIETITIAN INITIAL EVALUATION ADULT - ENTER TO (CAL/KG)
[FreeTextEntry1] : 53 y/o F with PF OA, hx cervical radiculopathy\par All options were explained to patient including BOBO for neck, CSI for knee. Declines these measures\par Meloxicam prescribed and new PT rx given 33 30

## 2025-04-05 NOTE — ED PROVIDER NOTE - OBJECTIVE STATEMENT
89F with PMHx of dementia AOx0, HTN, HLD, hypothyroidism, PEG tube 2/2 dysphagia, and chronic wounds, recent pna currently being treated with cefpodoxime and doxycycline here today for fevers, hypoxia, ronchi, increased lethargy at home.

## 2025-04-05 NOTE — DIETITIAN INITIAL EVALUATION ADULT - REASON
No overt signs of wasting identified on limited observation. Unable to complete full exam in setting of medical equipment.

## 2025-04-05 NOTE — PATIENT PROFILE ADULT - FALL HARM RISK - HARM RISK INTERVENTIONS

## 2025-04-05 NOTE — DIETITIAN INITIAL EVALUATION ADULT - OTHER INFO
89F with PMH of Alzheimer's dementia (AxOx0 baseline), cataracts, hypothyroidism, HLD, DAYSI, SVT, chronic pressure wounds, and dysphagia (status post PEG) who presented to [facility] ED in setting of AMS and hypoxia, found to have AHRF PNA, status post code in setting of hypoxia secondary to mucus plug vs aspiration, admitted to MICU for management.     Pt seen on 7EA for assessment. Labs and medication orders reviewed. Ordered for PO synthroid, norepinephrine, IV thiamine 200mg. Electrolytes WNL, lactate 4.4 <high>, BUN/Cr 43 <high>/Cr 0.56 WNL, CRP 33.2 <high>. NPO, +PEG. No nausea/vomiting documented, abdominal distension noted. Small formed BM recorded 4/5. Wt per prior admission RD note (1/8) 105 pounds; current admission wt 107 pounds / 48.8 kilograms indicates stability x3 months. Per RD note (1/8/25), pt home tube feed regimen: Jevity 1.2 bolus feeds, x5 cans q4-5hr; home regimen recommended on d/c in setting of reported pt tolerance. No known food allergies. No Gnosticist/ethnic/cultural food preferences noted. Sacral stage IV and right hip stage IV pressure injuries documented, 2+ bilateral leg edema noted, Will score 10. See nutrition recommendations. RD to remain available.  89F with PMH of Alzheimer's dementia (AxOx0 baseline), cataracts, hypothyroidism, HLD, DAYSI, SVT, chronic pressure wounds, and dysphagia (status post PEG) who presented to [facility] ED in setting of AMS and hypoxia, found to have AHRF PNA, status post code in setting of hypoxia secondary to mucus plug vs aspiration, admitted to MICU for management.     Pt seen on 7EA for assessment. Labs and medication orders reviewed. Ordered for PO synthroid, norepinephrine, IV thiamine 200mg. Electrolytes WNL, lactate 4.4 <high>, BUN/Cr 43 <high>/Cr 0.56 WNL, CRP 33.2 <high>. NPO, +PEG. Intubated, vent AC/VC. TMax 99.7F, BP mean 91. Pt's home aide at bedside. Reports pt NPO with tube feeds via PEG PTA: Jevity 1.2 x5 237mL bolus feeds daily. Endorses pt with good tolerance for months until x1 week ago home RN noticed a "gurgling sound," after which tube feed regimen was changed to x10 ~118mL feeds daily. No nausea/vomiting documented, abdominal distension noted. Small formed BM recorded 4/5. Wt per prior admission RD note (1/8) 105 pounds; current admission wt 107 pounds / 48.8 kilograms indicates stability x3 months. Aide confirms pt with no known food allergies. No Scientology/ethnic/cultural food preferences noted. Sacral stage IV and right hip stage IV pressure injuries documented, 2+ bilateral leg edema noted, Will score 10. See nutrition recommendations. RD to remain available.

## 2025-04-05 NOTE — PATIENT PROFILE ADULT - NSPROPTRIGHTCAREGIVER_GEN_A_NUR
PATIENT STATES: that she needs a ER F/U. With Dr Sha Ndiaye     Admitting Provider: Boston Reinoso MD    PATIENT CAN BE REACHED ON:[phone]    
Scheduled ER F/U 6/9/20 1:00 pm BB (30 minutes).   
yes

## 2025-04-05 NOTE — PATIENT PROFILE ADULT - NSPRONUTRITIONRISK_GEN_A_NUR
Emergency Department Encounter     Evaluation Date & Time:   8/23/2021 10:56 AM    CHIEF COMPLAINT:  Leg Pain      Triage Note:The patient presents with left leg pain. The patient denies trauma. The leg started hurting last Friday.         ED COURSE & MEDICAL DECISION MAKING:        Pt here with ongoing, recurrent left leg pain with radiation down from back/buttocks region.  Pt states this is chronic, but worsened recently with no known injury or change in activity.  Examination/history obtained with interpretive services and help from family in the room as pt is deaf (no sign language) and needs multiple levels of interpretation.  Leg is very much unremarkable with no swelling/rash/trauma/effusion with intact neurovascular.  + straight leg raise on left with tenderness along buttocks.  No spinal tenderness, afebrile, vitals normal.  No red flag s/sx including no incontinence/retention or saddle anesthesia reported. Pt with multiple allergies, so I will try muscle relaxant with robaxin, medrol dosepak and outpatient spine center referral.  Pt and family agreeable.  There is no indication for emergent imaging.    10:59 AM I met with the patient for the initial interview and physical examination. Discussed plan for treatment and workup in the ED. PPE: Provider wore N95 mask and gloves.     At the conclusion of the encounter I discussed the results of all the tests and the disposition. The questions were answered. The patient or family acknowledged understanding and was agreeable with the care plan.      MEDICATIONS GIVEN IN THE EMERGENCY DEPARTMENT:  Medications   acetaminophen (TYLENOL) tablet 975 mg (975 mg Oral Given 8/23/21 1136)   methocarbamol (ROBAXIN) tablet 500 mg (500 mg Oral Given 8/23/21 1136)       NEW PRESCRIPTIONS STARTED AT TODAY'S ED VISIT:  Discharge Medication List as of 8/23/2021 11:29 AM      START taking these medications    Details   lidocaine (LIDODERM) 5 % patch Place 1 patch onto the skin  every 24 hours for 10 daysDisp-14 patch, R-0Local Print      methocarbamol (ROBAXIN) 500 MG tablet Take 1 tablet (500 mg) by mouth 3 times daily, Disp-21 tablet, R-0, Local Print      methylPREDNISolone (MEDROL DOSEPAK) 4 MG tablet therapy pack Follow Package Directions, Disp-21 tablet, R-0, Local Print             HPI   HPI     Use of : Yes (Vocera: Language line), Language - Jessica. Family member helping to interpret and provide history at bedside as patient has a history of hearing loss.     Chasity Gaspar is a 69 year old male with a pertinent history of cervicalgia, lumbar canal stenosis, and lacunar stroke who presents to this ED by private vehicle for evaluation of left low back and leg pain.    Per patient's family member, the patient has had left leg and knee pain for awhile. The pain starts in the patient's left low back and radiates down to the left knee. Yesterday after getting out of the shower the patient twisted his left leg a little bit and had increased pain. The pain in his low back and waist region is worsened when he lifts his left leg. No right leg pain. Patient denies abdominal pain, chest pain, changes in bowel/bladder habits, or additional symptoms at this time.       REVIEW OF SYSTEMS:  Review of Systems   Constitutional: Negative for chills and fever.   HENT: Negative for sore throat.    Eyes: Negative for visual disturbance.   Respiratory: Negative for shortness of breath.    Cardiovascular: Negative for chest pain.   Gastrointestinal: Negative for abdominal pain, diarrhea, nausea and vomiting.   Endocrine: Negative for polyuria.   Genitourinary: Negative for dysuria and hematuria.        - urinary changes   Musculoskeletal: Positive for back pain. Negative for joint swelling.        +Left leg pain   Skin: Negative for rash.   Neurological: Negative for dizziness.   Psychiatric/Behavioral: Negative for confusion.   All other systems reviewed and are negative.      Medical History      Past Medical History:   Diagnosis Date     Cervicalgia      Depression      Dyslipidemia      Dysthymia      Gastritis      GERD (gastroesophageal reflux disease)      Hearing loss      Hiatal hernia      Hypertriglyceridemia      Insomnia      Lacunar stroke (H)      Lumbar canal stenosis      Lumbar radiculopathy      Myalgia and myositis        Past Surgical History:   Procedure Laterality Date     C APPENDECTOMY      Description: Appendectomy;  Recorded: 07/12/2013;     IR CEREBRAL ANGIOGRAM  4/27/2017     IR LUMBAR TRANSFORAMINAL EPIDURAL STRD INJ  7/2/2012     PICC  4/28/2017          MD ARTHRODESIS ANT INTERBODY MIN DISCECTOMY,LUMBAR      Description: Lumbar Vertebral Fusion;  Recorded: 07/16/2013;  Comments: 3/17/11 spinal decompression and fusion L4-5, L5-S1 for spinal stenosis, DDD, spondylolysis; Dr. Casillas [facility]     MD ESOPHAGOGASTRODUODENOSCOPY TRANSORAL DIAGNOSTIC      Description: Esophagogastroduodenoscopy;  Proc Date: 04/02/2012;  Comments: biosies:   reactive gastropathy with chronic superimposed nonspecific chronic inflammation (likely secondary to ASA, NSAIDS, EtOH or other irritants), NEG for h. pylori; small hiatal hernia     MD INJECT ANES/STEROID FORAMEN LUMBAR/SACRAL W IMG GUIDE ,1 LEVEL      Description: Nerve Block Transforaminal Epidural Lumbar L3 - L4;  Recorded: 07/10/2012;  Comments: 7/2/2012 for severe low back pain, spinal stenosis and radiculopathy     THROMBECTOMY  04/24/2017    Rt CELIA     US ASPIRATION OR INJECTION MAJOR JOINT  10/23/2019       History reviewed. No pertinent family history.    Social History     Tobacco Use     Smoking status: Never Smoker     Smokeless tobacco: Never Used   Substance Use Topics     Alcohol use: Never     Drug use: Never       lidocaine (LIDODERM) 5 % patch  methocarbamol (ROBAXIN) 500 MG tablet  methylPREDNISolone (MEDROL DOSEPAK) 4 MG tablet therapy pack  acetaminophen 500 mg coapsule  allopurinol (ZYLOPRIM) 300 MG  "tablet  allopurinoL (ZYLOPRIM) 300 MG tablet  allopurinoL (ZYLOPRIM) 300 MG tablet  atorvastatin (LIPITOR) 40 MG tablet  atorvastatin (LIPITOR) 40 MG tablet  atorvastatin (LIPITOR) 40 MG tablet  clobetasol (TEMOVATE) 0.05 % ointment  clopidogrel (PLAVIX) 75 MG tablet  clopidogreL (PLAVIX) 75 mg tablet  clopidogreL (PLAVIX) 75 mg tablet  diclofenac (VOLTAREN) 75 MG EC tablet  DULoxetine (CYMBALTA) 60 MG capsule  DULoxetine (CYMBALTA) 60 MG capsule  dutasteride (AVODART) 0.5 MG capsule  ergocalciferol (ERGOCALCIFEROL) 1,250 mcg (50,000 unit) capsule  ergocalciferol (ERGOCALCIFEROL) 1.25 MG (57209 UT) capsule  gabapentin (NEURONTIN) 300 MG capsule  gabapentin (NEURONTIN) 300 MG capsule  hydrOXYzine pamoate (VISTARIL) 50 MG capsule  magnesium oxide (MAG-OX) 400 mg (241.3 mg magnesium) tablet  magnesium oxide (MAG-OX) 400 MG tablet  meclizine (ANTIVERT) 25 mg tablet  melatonin 3 mg Tab tablet  metoprolol tartrate (LOPRESSOR) 25 MG tablet  metoprolol tartrate (LOPRESSOR) 25 MG tablet  metoprolol tartrate (LOPRESSOR) 25 MG tablet  mirtazapine (REMERON) 15 MG tablet  omeprazole (PRILOSEC) 20 MG capsule  omeprazole (PRILOSEC) 20 MG capsule  omeprazole (PRILOSEC) 20 MG DR capsule  polyethylene glycol (MIRALAX) 17 gram packet  tamsulosin (FLOMAX) 0.4 MG capsule        Physical Exam     Triage Vitals:  ED Triage Vitals [08/23/21 0913]   Enc Vitals Group      BP (!) 190/104      Pulse 75      Resp 18      Temp 97.7  F (36.5  C)      Temp src Oral      SpO2 98 %      Weight 54.4 kg (120 lb)      Height 1.549 m (5' 1\")      Head Circumference       Peak Flow       Pain Score       Pain Loc       Pain Edu?       Excl. in GC?         Vitals:  BP (!) 179/97   Pulse 73   Temp 97.7  F (36.5  C) (Oral)   Resp 18   Ht 1.549 m (5' 1\")   Wt 54.4 kg (120 lb)   SpO2 96%   BMI 22.67 kg/m      PHYSICAL EXAM:   Physical Exam  Vitals and nursing note reviewed.   Constitutional:       General: He is not in acute distress.     Appearance: " Normal appearance.   HENT:      Head: Normocephalic and atraumatic.      Nose: Nose normal.      Mouth/Throat:      Mouth: Mucous membranes are moist.   Eyes:      Pupils: Pupils are equal, round, and reactive to light.   Cardiovascular:      Rate and Rhythm: Normal rate and regular rhythm.      Pulses: Normal pulses.           Radial pulses are 2+ on the right side and 2+ on the left side.        Dorsalis pedis pulses are 2+ on the right side and 2+ on the left side.   Pulmonary:      Effort: Pulmonary effort is normal. No respiratory distress.      Breath sounds: Normal breath sounds.   Abdominal:      Palpations: Abdomen is soft.      Tenderness: There is no abdominal tenderness.   Musculoskeletal:      Cervical back: Full passive range of motion without pain and neck supple.      Lumbar back: Positive left straight leg raise test.      Comments: Tenderness to palpation over the left mid buttock  No spine tenderness  No calf tenderness or swelling b/l   Skin:     General: Skin is warm.      Findings: No rash.   Neurological:      General: No focal deficit present.      Mental Status: He is alert. Mental status is at baseline.      Comments: Fluent speech, no acute lateralizing deficits   Psychiatric:         Mood and Affect: Mood normal.         Behavior: Behavior normal.         Results     LAB:  All pertinent labs reviewed and interpreted  Labs Ordered and Resulted from Time of ED Arrival Up to the Time of Departure from the ED - No data to display    RADIOLOGY:  No orders to display                ECG:  none    PROCEDURES:  Procedures:  none      FINAL IMPRESSION:    ICD-10-CM    1. Lumbar radiculopathy  M54.16 Spine Referral       0 minutes of critical care time      I, Carmen Diamond, am serving as a scribe to document services personally performed by Dr. Logan Quinteros, based on my observations and the provider's statements to me. ILogan, DO attest that Carmen Diamond is acting in a scribe  capacity, has observed my performance of the services and has documented them in accordance with my direction.      Logan Quinteros DO  Emergency Medicine  [facility] EMERGENCY DEPARTMENT  8/23/2021  10:56 AM        Logan Quinteros MD  08/23/21 [id number]     Pressure injury stage 2 or greater Enteral/parenteral nutrition prior to admission/Pressure injury stage 2 or greater

## 2025-04-05 NOTE — DIETITIAN INITIAL EVALUATION ADULT - ADD RECOMMEND
1. With consideration for elevated lactate and increased pressor requirements within the last 24hr, continue to hold nutrition at this time. Pending hemodynamic stability, recommend resume home tube feed regimen via PEG: Jevity 1.2 x5 237mL (=x1 can) bolus feeds to provide 1185mL total volume, 1422kcal (29kcal/kg dosing wt 48.8kg), 66g protein (1.3g/kg dosing wt 48.8kg), and 956mL free water.   **Above recommendation made with consideration for no propofol, consult nutrition for adjustment prn.**  >>Defer free water flushes to team. Recommend at least 30mL free water flush before and after each tube feed bolus for line patency; increase as need for hydration demands.   >>Note pt ordered for PO synthroid, recommend administer >/=1hr apart from feeds.   >>Monitor GI tolerance and maintain aspiration precautions. Hold feeds if increase in pressor requirements, MAPs consistently trending <60 mmHg, lactic acidosis present, and/or GI intolerance is noted. RD to remain available to adjust EN regimen prn.   2. Continue thiamine supplementation.   3. Monitor weight trends, labs, skin integrity, & hydration status.  4. Pain and bowel regimens per team.  5. RD remains available for dietary education/intervention prn.  1. With consideration for elevated lactate and increased pressor requirements within the last 24hr, continue to hold nutrition at this time. Pending hemodynamic stability, recommend  **Above recommendation made with consideration for no propofol, consult nutrition for adjustment prn.**  >>Defer free water flushes to team.   >>Note pt ordered for PO synthroid, recommend administer >/=1hr apart from feeds.   >>Monitor GI tolerance and maintain aspiration precautions. Hold feeds if increase in pressor requirements, MAPs consistently trending <60 mmHg, lactic acidosis present, and/or GI intolerance is noted. RD to remain available to adjust EN regimen prn.   2. Continue thiamine supplementation.   3. Monitor weight trends, labs, skin integrity, & hydration status.  4. Pain and bowel regimens per team.  5. RD remains available for dietary education/intervention prn.  1. With consideration for elevated lactate and increased pressor requirements within the last 24hr, continue to hold nutrition at this time. Pending hemodynamic stability, recommend resume tube feeds via PEG: Jevity 1.5 at 10mL/hr and increase 10mL q4hr or as tolerated to goal rate 50mL/hr x18hr (11:00-05:00) + x1/day LPS (100kcal, 15g protein) to provide 900mL total volume, 1450kcal (28kcal/kg dosing wt 48.8kg), 72g protein (1.5g/kg dosing wt 48.8kg), and 684mL free water.   **Above recommendation made with consideration for no propofol, consult nutrition for adjustment prn.**  >>Defer free water flushes to team.   >>Due to concern for possible aspiration event recommend continuous feeds at this time, consider transition to bolus feeds for d/c as medically feasible and tolerated. Note pt ordered for PO synthroid, recommend administer >/=1hr apart from feeds.   >>Monitor GI tolerance and maintain aspiration precautions. Hold feeds if increase in pressor requirements, MAPs consistently trending <60 mmHg, lactic acidosis present, and/or GI intolerance is noted. RD to remain available to adjust EN regimen prn.   2. Continue thiamine supplementation.   3. Monitor weight trends, labs, skin integrity, & hydration status.  4. Pain and bowel regimens per team.  5. RD remains available for dietary education/intervention prn.

## 2025-04-05 NOTE — DIETITIAN INITIAL EVALUATION ADULT - OTHER CALCULATIONS
Estimated needs based on dosing wt as <100%  pounds / 50 kilograms in critical care setting. Needs adjusted for advanced age, wound healing, and clinical status.

## 2025-04-06 NOTE — PROGRESS NOTE ADULT - SUBJECTIVE AND OBJECTIVE BOX
O/N Events:  Subjective/ROS: Denies HA, CP, SOB, n/v, changes in bowel/urinary habits.  12pt ROS otherwise negative.    VITALS  Vital Signs Last 12 Hrs  T(F): 99.9 (04-06-25 @ 05:00), Max: 100.2 (04-06-25 @ 01:04)  HR: 92 (04-06-25 @ 06:22) (91 - 96)  BP: --  BP(mean): --  RR: 14 (04-06-25 @ 06:22) (14 - 34)  SpO2: 99% (04-06-25 @ 06:22) (96% - 100%)  I&O's Summary    04 Apr 2025 07:01  -  05 Apr 2025 07:00  --------------------------------------------------------  IN: 56 mL / OUT: 250 mL / NET: -194 mL    05 Apr 2025 07:01  -  06 Apr 2025 06:38  --------------------------------------------------------  IN: 2463 mL / OUT: 680 mL / NET: 1783 mL        PHYSICAL EXAM  General: A&Ox3; NAD  Head: NC/AT; MMM; PERRL; EOMI;  Neck: Supple; no JVD  Respiratory: CTA B/L; no wheezes/crackles   Cardiovascular: Regular rhythm/rate; S1/S2   Gastrointestinal: Soft; NTND; normoactive BS  Extremities: WWP; no edema/cyanosis  Neurological:  CNII-XII grossly intact; no obvious focal deficits    MEDICATIONS  (STANDING):  atorvastatin 10 milliGRAM(s) Oral every 24 hours  chlorhexidine 0.12% Liquid 15 milliLiter(s) Oral Mucosa every 12 hours  chlorhexidine 2% Cloths 1 Application(s) Topical <User Schedule>  fentaNYL   Infusion. 0.5 MICROgram(s)/kG/Hr (2.44 mL/Hr) IV Continuous <Continuous>  heparin   Injectable 5000 Unit(s) SubCutaneous every 12 hours  hydrocortisone sodium succinate Injectable 50 milliGRAM(s) IV Push every 6 hours  levETIRAcetam   Injectable 500 milliGRAM(s) IV Push every 12 hours  levothyroxine 25 MICROGram(s) Oral every 24 hours  norepinephrine Infusion 0.05 MICROgram(s)/kG/Min (4.17 mL/Hr) IV Continuous <Continuous>  pantoprazole  Injectable 40 milliGRAM(s) IV Push every 24 hours  piperacillin/tazobactam IVPB.. 4.5 Gram(s) IV Intermittent every 8 hours  propofol Infusion 25 MICROgram(s)/kG/Min (7.32 mL/Hr) IV Continuous <Continuous>  thiamine Injectable 200 milliGRAM(s) IV Push daily  vancomycin  IVPB 750 milliGRAM(s) IV Intermittent every 24 hours    MEDICATIONS  (PRN):      No Known Allergies      LABS                        7.5    22.70 )-----------( 403      ( 06 Apr 2025 01:39 )             25.7     04-06    147[H]  |  106  |  31[H]  ----------------------------<  154[H]  4.6   |  30  |  0.64    Ca    7.7[L]      06 Apr 2025 01:39  Phos  3.6     04-05  Mg     2.0     04-05    TPro  5.3[L]  /  Alb  2.4[L]  /  TBili  0.2  /  DBili  x   /  AST  44[H]  /  ALT  32  /  AlkPhos  75  04-06    PT/INR - ( 05 Apr 2025 00:39 )   PT: 11.6 sec;   INR: 1.01          PTT - ( 05 Apr 2025 00:39 )  PTT:31.9 sec  Urinalysis Basic - ( 06 Apr 2025 01:39 )    Color: x / Appearance: x / SG: x / pH: x  Gluc: 154 mg/dL / Ketone: x  / Bili: x / Urobili: x   Blood: x / Protein: x / Nitrite: x   Leuk Esterase: x / RBC: x / WBC x   Sq Epi: x / Non Sq Epi: x / Bacteria: x            IMAGING/EKG/ETC  EKG:  Xray:  CT:  MRI: O/N Events:  updated [facility] (see chart note); vanc trough 5.7- vanc [id number] doses, vEEG    Subjective/ROS:   Patient seen and examined at bedside, intubated and sedated.     VITALS  Vital Signs Last 12 Hrs  T(F): 99.9 (04-06-25 @ 05:00), Max: 100.2 (04-06-25 @ 01:04)  HR: 92 (04-06-25 @ 06:22) (91 - 96)  BP: --  BP(mean): --  RR: 14 (04-06-25 @ 06:22) (14 - 34)  SpO2: 99% (04-06-25 @ 06:22) (96% - 100%)  I&O's Summary    04 Apr 2025 07:01  -  05 Apr 2025 07:00  --------------------------------------------------------  IN: 56 mL / OUT: 250 mL / NET: -194 mL    05 Apr 2025 07:01  -  06 Apr 2025 06:38  --------------------------------------------------------  IN: 2463 mL / OUT: 680 mL / NET: 1783 mL        PHYSICAL EXAM  General: A&Ox0; elderly, frail & dry appearing   Head: NC/AT; pupils constricted, sluggishly reactive   Neck: Supple; no JVD  Respiratory: ventilated breath sounds   Cardiovascular: Regular rhythm/rate; S1/S2   Gastrointestinal: Soft; NTND; difficult to appreciate BS   Extremities: WWP; no edema/cyanosis  Neurological: intubated & sedated     MEDICATIONS  (STANDING):  atorvastatin 10 milliGRAM(s) Oral every 24 hours  chlorhexidine 0.12% Liquid 15 milliLiter(s) Oral Mucosa every 12 hours  chlorhexidine 2% Cloths 1 Application(s) Topical <User Schedule>  fentaNYL   Infusion. 0.5 MICROgram(s)/kG/Hr (2.44 mL/Hr) IV Continuous <Continuous>  heparin   Injectable 5000 Unit(s) SubCutaneous every 12 hours  hydrocortisone sodium succinate Injectable 50 milliGRAM(s) IV Push every 6 hours  levETIRAcetam   Injectable 500 milliGRAM(s) IV Push every 12 hours  levothyroxine 25 MICROGram(s) Oral every 24 hours  norepinephrine Infusion 0.05 MICROgram(s)/kG/Min (4.17 mL/Hr) IV Continuous <Continuous>  pantoprazole  Injectable 40 milliGRAM(s) IV Push every 24 hours  piperacillin/tazobactam IVPB.. 4.5 Gram(s) IV Intermittent every 8 hours  propofol Infusion 25 MICROgram(s)/kG/Min (7.32 mL/Hr) IV Continuous <Continuous>  thiamine Injectable 200 milliGRAM(s) IV Push daily  vancomycin  IVPB 750 milliGRAM(s) IV Intermittent every 24 hours    MEDICATIONS  (PRN):      No Known Allergies      LABS                        7.5    22.70 )-----------( 403      ( 06 Apr 2025 01:39 )             25.7     04-06    147[H]  |  106  |  31[H]  ----------------------------<  154[H]  4.6   |  30  |  0.64    Ca    7.7[L]      06 Apr 2025 01:39  Phos  3.6     04-05  Mg     2.0     04-05    TPro  5.3[L]  /  Alb  2.4[L]  /  TBili  0.2  /  DBili  x   /  AST  44[H]  /  ALT  32  /  AlkPhos  75  04-06    PT/INR - ( 05 Apr 2025 00:39 )   PT: 11.6 sec;   INR: 1.01          PTT - ( 05 Apr 2025 00:39 )  PTT:31.9 sec  Urinalysis Basic - ( 06 Apr 2025 01:39 )    Color: x / Appearance: x / SG: x / pH: x  Gluc: 154 mg/dL / Ketone: x  / Bili: x / Urobili: x   Blood: x / Protein: x / Nitrite: x   Leuk Esterase: x / RBC: x / WBC x   Sq Epi: x / Non Sq Epi: x / Bacteria: x            IMAGING/EKG/ETC: Reviewed.

## 2025-04-06 NOTE — PROGRESS NOTE ADULT - ASSESSMENT
Patient is an 88 yo F w PMHx Alzheimers dementia, cataracts, hypothyroidism, HLD, DAYSI, SVT, chronic pressure wounds, dysphagia (s/p PEG) who presented to [facility] ED iso AMS and hypoxia, found to have AHRF pna and s/p code iso hypoxia 2/2 mucus plug vs aspiration    Neuro   #acute metabolic encephalopathy  #AD  Baseline mental status AAOX0, nonsensical speech.  Today, patient was more lethargic and with increasing O2 requirements, prompting them to seek medical attention   Home meds: trazadone 50mg BID, Aricept 10mg, seroquel 50mg, memantine 10mg BID  On PE patient AAOx0, not following commands  Multifactorial iso sepsis, hypoxia w poor baseline mental status   Patient not on sedation   Plan  - f/up TSH, NH3 and ABG  - f/up CTH  - tx pna as below  - holding home meds for now     #FTT  Patient with physical frailty, disability (relies on family and HHA w all ADLs), impaired neuropsychiatric function (AD, baseline AAOx0)  Plan  - OT pending mental status improvement  - nutrition consult  - per HCP full code    Pulm   #AHRF  #ongoing pna  #mucus plugging  Patient w increased secretions and fever at home and was being treated for pna w Cefpodoxime (had not finished 10d course) and Doxycycline (completed 5d course). Patient developed fevers on abx (Tmax 101) w increasing O2 requirements (at baseline, uses 1L NC during sleep), prompting them to seek medical attention.   In ED, patient was saturating mid 70s on RA> 93% on NC>90s on HFNC. As per ED, pt became acutely hypoxic in which they believe to be 2/2 mucus plugging vs aspiration and coded s/p intubation and 4 rounds of CPR with ROSC obtained.   mechanical ventilation settings in ED:  VCAC/18/400/5/80  [facility]  [facility]  - holding tube feeds  - repeat ABG  - f/up CT chest  - f/up MRSA, extended RVP, urine leg and strep   - f/up procal  -  thiamine 200mg IV for 5 d  - collect tracheal aspirate   - c/w abx: vanc, zosyn, azithromycin     Cards  #septic shock   Patient meeting 4/4 SIRS criteria (T 101 at home, , RR 23, WBC 14.57) + lung source (pna) vs MSK (pressure wounds)  s/p cardiac arrest, now requiring pressors   Plan  - Monitor WBC  - Monitor for fevers  - f/u BCx   - TTE  - c/w levophed wean for MAP goal >65    #narrow complex tachycardia  #hypotension  Home meds: Midodrine 10mg q6 hrs prn, Metoprolol 12.5mg BID, Digoxin 0.125mg BID, ASA 81mg  Of note, approx 10 d ago, patient went into narrow complex tachycardia (HR 170s) and hypotension (70/20) in which patient was given fluids (gatorade), digoxin, and levophed at home with improvement in sx (home doctors: Dr Kush Maya).  Patient currently on levophed w HR 50  Plan  - f/up EKG  - c/w levophed  - holding midodrine, digoxin and toprol for now    #HLD   home meds: Lipitor 10mg  Plan  - c/w home meds    GI  #PEG  Patient w PEG tube placed iso dysphagia. Patient w c/f asp pna  Plan  - holding tube feeds iso c/f aspiration   - nutrition consulted   - FSGq6 hrs      strict I/Os    Renal  Given code , will check CPK  Given cachectic appearance, will check cystatin c    Endo  #hypothyroidism  Home med levothyroxine 25 mcg  Plan  - c/w home med  - f/up TSH    #NPO-  check FSG q6 hrs     Heme  #DAYSI  Patient w known DAYSI. As per Stefano, was suppose to be started on ferrous sulfate.   Hgb on admission: Hgb 9, MCV 77  No active signs of bleeding   Plan  - f/up iron studies  - daily CBC  - maintain active T&S    #DVT ppx  Patient w acute drop in Hgb without evidence of bleed  Plan  - will hold lovenox for now  - c/w SCD    MSK  #pressure wounds  In January, pt w  bleeding R hip pressure wound after debridement with wound care at home the day prior, s/p suture ligation of oozing vessels with surgery   stage 4 R thigh, L thigh , and thoracic- As per Stefano, patient underwent L thigh skin graft 3 weeks ago   On PE, purulence noted in sacrum   c/f OM  Plan  - Wet and dry Kerlix packing daily. Secure with clean gauze/abdominal pad with tape on top.  - wound care consult  - f/up ESR and CRP  - f/up CT imaging given c/f OM     ID    #SSTI - rule out necrotizing infection  - per CT scan, patient has had chronic     #pna  #pressure wounds  Patient w increased secretions and fever at home and was being treated for pna w Cefpodoxime and Doxycycline. Patient developed fevers on abx (Tmax 101) w increasing O2 requirements (at baseline, uses 1L NC during sleep), prompting them to seek medical attention. In ED, patient was saturating mid 70s on RA> 93% on NC>90s on HFNC. As per ED, pt became acutely hypoxic in which they believe to be 2/2 mucus plugging vs aspiration - s/p code w intubation  In terms of pressure wounds, patient w chronic wounds as described below. On PE, sacral ulcer w purulence   Plan  - holding tube feeds  - f/up CT imaging  - f/up ESR and CRP  - f/up MRSA, RVP, urine leg and strep   - f/up procal  - collect tracheal asp  - c/w abx: vanc (750q12, vanc trough prior to 4th dose), zosyn (3.375q8 hrs), azithromycin (500mg IV q24 hrs)  - wound care consult    Patient to be admitted to MICU  Plan discussed with Dr. Goodson     Patient is an 89 yo F w PMHx Alzheimer's dementia, cataracts, hypothyroidism, HLD, DAYSI, SVT, chronic pressure wounds, dysphagia (s/p PEG) who presented to [facility] ED iso AMS and hypoxia, found to have AHRF pna and s/p code iso hypoxia 2/2 mucus plug vs aspiration    Neuro   #Acute metabolic encephalopathy  #AD  Baseline mental status AAOX0, nonsensical speech.  Patient was more lethargic and with increasing O2 requirements, prompting arrival to ED.   Home meds: trazodone 50mg BID, Aricept 10mg, seroquel 50mg, memantine 10mg BID  Multifactorial iso sepsis, hypoxia w poor baseline mental status   While in the ED on 4/5, patient coded and had CPR x4 with ROSC and intubated and transferred to MICU.  TSH elevated, free T4 elevated.   CTH: No evidence of acute intracranial pathology.  - tx pna as below  - holding home meds for now     #FTT  Patient with physical frailty, disability (relies on family and HHA w all ADLs), impaired neuropsychiatric function (AD, baseline AAOx0)  - OT pending mental status improvement  - nutrition consult  - per HCP full code    Pulm   #AHRF  #ongoing pna  #mucus plugging  Patient w increased secretions and fever at home and was being treated for pna w Cefpodoxime (had not finished 10d course) and Doxycycline (completed 5d course). Patient developed fevers on abx (Tmax 101) w increasing O2 requirements (at baseline, uses 1L NC during sleep), prompting them to seek medical attention.   In ED, patient was saturating mid 70s on RA> 93% on NC>90s on HFNC. As per ED, pt became acutely hypoxic in which they believe to be 2/2 mucus plugging vs aspiration and coded s/p intubation and 4 rounds of CPR with ROSC obtained.   mechanical ventilation settings in ED:  VCAC/18/400/5/80  CT Chest:     - c/w mech ventilation for now  - holding tube feeds  - f/up CT chest  - f/up MRSA, extended RVP, urine leg and strep   - f/up procal  -  thiamine 200mg IV for 5 d  - collect tracheal aspirate   - c/w abx: vanc, zosyn, azithromycin     Cards  #septic shock   Patient meeting 4/4 SIRS criteria (T 101 at home, , RR 23, WBC 14.57) + lung source (pna) vs MSK (pressure wounds)  s/p cardiac arrest, now requiring pressors   Plan  - Monitor WBC  - Monitor for fevers  - f/u BCx   - TTE  - c/w levophed wean for MAP goal >65    #narrow complex tachycardia  #hypotension  Home meds: Midodrine 10mg q6 hrs prn, Metoprolol 12.5mg BID, Digoxin 0.125mg BID, ASA 81mg  Of note, approx 10 d ago, patient went into narrow complex tachycardia (HR 170s) and hypotension (70/20) in which patient was given fluids (gatorade), digoxin, and levophed at home with improvement in sx (home doctors: Dr Kush Maya).  Patient currently on levophed w HR 50  Plan  - f/up EKG  - c/w levophed  - holding midodrine, digoxin and toprol for now    #HLD   home meds: Lipitor 10mg  Plan  - c/w home meds    GI  #PEG  Patient w PEG tube placed iso dysphagia. Patient w c/f asp pna  Plan  - holding tube feeds iso c/f aspiration   - nutrition consulted   - FSGq6 hrs      strict I/Os    Renal  Given code , will check CPK  Given cachectic appearance, will check cystatin c    Endo  #hypothyroidism  Home med levothyroxine 25 mcg  Plan  - c/w home med  - f/up TSH    #NPO-  check FSG q6 hrs     Heme  #DAYSI  Patient w known DAYSI. As per Stefano, was suppose to be started on ferrous sulfate.   Hgb on admission: Hgb 9, MCV 77  No active signs of bleeding   Plan  - f/up iron studies  - daily CBC  - maintain active T&S    #DVT ppx  Patient w acute drop in Hgb without evidence of bleed  Plan  - will hold lovenox for now  - c/w SCD    MSK  #pressure wounds  In January, pt w  bleeding R hip pressure wound after debridement with wound care at home the day prior, s/p suture ligation of oozing vessels with surgery   stage 4 R thigh, L thigh , and thoracic- As per Stefano, patient underwent L thigh skin graft 3 weeks ago   On PE, purulence noted in sacrum   c/f OM  Plan  - Wet and dry Kerlix packing daily. Secure with clean gauze/abdominal pad with tape on top.  - wound care consult  - f/up ESR and CRP  - f/up CT imaging given c/f OM     ID    #SSTI - rule out necrotizing infection  - per CT scan, patient has had chronic     #pna  #pressure wounds  Patient w increased secretions and fever at home and was being treated for pna w Cefpodoxime and Doxycycline. Patient developed fevers on abx (Tmax 101) w increasing O2 requirements (at baseline, uses 1L NC during sleep), prompting them to seek medical attention. In ED, patient was saturating mid 70s on RA> 93% on NC>90s on HFNC. As per ED, pt became acutely hypoxic in which they believe to be 2/2 mucus plugging vs aspiration - s/p code w intubation  In terms of pressure wounds, patient w chronic wounds as described below. On PE, sacral ulcer w purulence   Plan  - holding tube feeds  - f/up CT imaging  - f/up ESR and CRP  - f/up MRSA, RVP, urine leg and strep   - f/up procal  - collect tracheal asp  - c/w abx: vanc (750q12, vanc trough prior to 4th dose), zosyn (3.375q8 hrs), azithromycin (500mg IV q24 hrs)  - wound care consult    Patient to be admitted to MICU  Plan discussed with Dr. Goodson     Patient is an 88 yo F w PMHx Alzheimer's dementia, cataracts, hypothyroidism, HLD, DAYSI, SVT, chronic pressure wounds, dysphagia (s/p PEG) who presented to [facility] ED iso AMS and hypoxia, found to have AHRF pna and s/p code iso hypoxia 2/2 mucus plug vs aspiration    Neuro   #Acute metabolic encephalopathy  #AD  Baseline mental status AAOX0, nonsensical speech.  Patient was more lethargic and with increasing O2 requirements, prompting arrival to ED.   Home meds: trazodone 50mg BID, Aricept 10mg, seroquel 50mg, memantine 10mg BID  Multifactorial iso sepsis, hypoxia w poor baseline mental status   While in the ED on 4/5, patient coded and had CPR x4 with ROSC and intubated and transferred to MICU.  TSH elevated, free T4 elevated.   CTH: No evidence of acute intracranial pathology.  - tx pna as below  - holding home meds for now     #FTT  Patient with physical frailty, disability (relies on family and HHA w all ADLs), impaired neuropsychiatric function (AD, baseline AAOx0)  - OT pending mental status improvement  - nutrition consult  - per HCP full code    Pulm   #AHRF  #ongoing pna  #mucus plugging  Patient w increased secretions and fever at home and was being treated for pna w Cefpodoxime (had not finished 10d course) and Doxycycline (completed 5d course). Patient developed fevers on abx (Tmax 101) w increasing O2 requirements (at baseline, uses 1L NC during sleep), prompting them to seek medical attention.   In ED, patient was saturating mid 70s on RA> 93% on NC>90s on HFNC. As per ED, pt became acutely hypoxic in which they believe to be 2/2 mucus plugging vs aspiration and coded s/p intubation and 4 rounds of CPR with ROSC obtained.   mechanical ventilation settings in ED:  VCAC/18/400/5/80  CT Chest: Small left apical hydropneumothorax. Left basilar atelectasis. Trace right pleural effusion with associated atelectasis. 6 mm indeterminate right middle lobe lung nodule (3:35). Bilateral patchy airspace opacities which could represent pneumonia.   - c/w mech ventilation for now  - holding tube feeds  - thiamine 200mg IV for 5 d    Cards  #septic shock   Patient meeting 4/4 SIRS criteria (T 101 at home, , RR 23, WBC 14.57) + lung source (pna) vs MSK (pressure wounds)  s/p cardiac arrest, now requiring pressors   - Monitor WBC  - Monitor for fevers  - f/u BCx   - TTE  - complete levophed bag, no further pressors as per [facility] discussion 4/5    #narrow complex tachycardia  #hypotension  Home meds: Midodrine 10mg q6 hrs prn, Metoprolol 12.5mg BID, Digoxin 0.125mg BID, ASA 81mg  Of note, approx 10 d ago, patient went into narrow complex tachycardia (HR 170s) and hypotension (70/20) in which patient was given fluids (gatorade), digoxin, and levophed at home with improvement in sx (home doctors: Dr Kush Maya).  Patient currently on levophed w HR 50  - holding midodrine, digoxin and toprol for now    #HLD   home meds: Lipitor 10mg  - c/w home meds    GI  #PEG  Patient w PEG tube placed iso dysphagia. Patient w c/f asp pna  - holding tube feeds iso c/f aspiration   - nutrition consulted   - FSGq6 hrs      strict I/Os    Renal    Cystatin C 1.48, eGFR 38     Endo  #hypothyroidism  TSH & free T4 elevated.   Home med levothyroxine 25 mcg  - c/w home med    #NPO-  check FSG q6 hrs     Heme  #DAYSI  Patient w known DAYSI. As per Stefano, was suppose to be started on ferrous sulfate.   Hgb on admission: Hgb 9, MCV 77  No active signs of bleeding   Iron total 23, TIBC 197, % sat 12, ferritin 107  - daily CBC  - maintain active T&S    #DVT ppx  Patient w acute drop in Hgb without evidence of bleed  - will hold lovenox for now  - c/w SCD    MSK  #pressure wounds  In January, pt w  bleeding R hip pressure wound after debridement with wound care at home the day prior, s/p suture ligation of oozing vessels with surgery   stage 4 R thigh, L thigh , and thoracic- As per Stefano, patient underwent L thigh skin graft 3 weeks ago   On PE, purulence noted in sacrum c/f OM  ESR 45 elevated  - Wet and dry Kerlix packing daily. Secure with clean gauze/abdominal pad with tape on top.  - wound care consult    ID  #SSTI   #pna  #pressure wounds  Patient w increased secretions and fever at home and was being treated for pna w Cefpodoxime and Doxycycline. Patient developed fevers on abx (Tmax 101) w increasing O2 requirements (at baseline, uses 1L NC during sleep), prompting them to seek medical attention. In ED, patient was saturating mid 70s on RA> 93% on NC>90s on HFNC. As per ED, pt became acutely hypoxic in which they believe to be 2/2 mucus plugging vs aspiration - s/p code w intubation  In terms of pressure wounds, patient w chronic wounds as described below. On PE, sacral ulcer w purulence   MRSA negative, rapid RVP negative, urine legionella & pneumonia negative. Procal elevated. dc'd azithromycin.   - holding tube feeds  - f/u sputum cx 4/5  - c/w abx: vanc, zosyn  - wound care consulted    PPX  F: D5/LR @ 100cc/hr   N: npo   GI PPx: pantoprazole 40 IV q24h  DVT PPx: SCDs   Dispo: MICU   Code Status: DNR/intubated

## 2025-04-06 NOTE — PROGRESS NOTE ADULT - CRITICAL CARE ATTENDING COMMENT
Patient seen and examined;  Multiple discussions with family and liaison Raul;  No restarting pressors once levophed bag runs out;  DNR  Will continue fluids and antibiotics

## 2025-04-07 NOTE — CONSULT NOTE ADULT - PROBLEM SELECTOR RECOMMENDATION 7
Complex medical decision making / symptom management in the setting of advanced illness.    Will continue to follow for ongoing GOC discussion / titration of palliative regimen. Emotional support provided, questions answered.  Active Psychosocial Referrals:  [x]Social Work/Case management []PT/OT [x]Chaplaincy []Hospice  []Patient/Family Support []Holistic RN []Massage Therapy []Music Therapy []Ethics  Coping: [] well [] with difficulty [] poor coping [x] unable to assess  Support system: [x] strong [] adequate [] inadequate    For new or uncontrolled symptoms, please call Palliative Care at [phone]). The service is available 24/7 (including nights & weekends) to provide symptom management recommendations over the phone as appropriate

## 2025-04-07 NOTE — PROGRESS NOTE ADULT - ASSESSMENT
Patient is an 88 yo F w PMHx Alzheimer's dementia, cataracts, hypothyroidism, HLD, DAYSI, SVT, chronic pressure wounds, dysphagia (s/p PEG) who presented to [facility] ED iso AMS and hypoxia, found to have AHRF pna and s/p code (4 rounds of CPR on 4/5 before ROSC) iso hypoxia 2/2 mucus plug vs aspiration    Neuro   #Acute metabolic encephalopathy  #AD  Baseline mental status AAOX0, nonsensical speech.  Patient was more lethargic and with increasing O2 requirements, prompting arrival to ED.   Home meds: trazodone 50mg BID, Aricept 10mg, seroquel 50mg, memantine 10mg BID  Multifactorial iso sepsis, hypoxia w poor baseline mental status   While in the ED on 4/5, patient coded and had CPR x4 with ROSC and intubated and transferred to MICU.  TSH elevated, free T4 elevated.   CTH: No evidence of acute intracranial pathology.  - tx pna as below  - holding home meds for now     #FTT  Patient with physical frailty, disability (relies on family and HHA w all ADLs), impaired neuropsychiatric function (AD, baseline AAOx0)  - OT pending mental status improvement  - nutrition consult  - per HCP full code    Pulm   #AHRF  #ongoing pna  #mucus plugging  Patient w increased secretions and fever at home and was being treated for pna w Cefpodoxime (had not finished 10d course) and Doxycycline (completed 5d course). Patient developed fevers on abx (Tmax 101) w increasing O2 requirements (at baseline, uses 1L NC during sleep), prompting them to seek medical attention.   In ED, patient was saturating mid 70s on RA> 93% on NC>90s on HFNC. As per ED, pt became acutely hypoxic in which they believe to be 2/2 mucus plugging vs aspiration and coded s/p intubation and 4 rounds of CPR with ROSC obtained.   mechanical ventilation settings in ED:  VCAC/18/400/5/80  CT Chest: Small left apical hydropneumothorax. Left basilar atelectasis. Trace right pleural effusion with associated atelectasis. 6 mm indeterminate right middle lobe lung nodule (3:35). Bilateral patchy airspace opacities which could represent pneumonia.   [facility]  - holding tube feeds  - thiamine 200mg IV for 5 d    Cards  #septic shock   Patient meeting 4/4 SIRS criteria (T 101 at home, , RR 23, WBC 14.57) + lung source (pna) vs MSK (pressure wounds)  s/p cardiac arrest, now requiring pressors   - Monitor WBC  - Monitor for fevers  - f/u BCx   - TTE  - complete levophed bag, no further pressors as per GOC discussion 4/5    #narrow complex tachycardia  #hypotension  Home meds: Midodrine 10mg q6 hrs prn, Metoprolol 12.5mg BID, Digoxin 0.125mg BID, ASA 81mg  Of note, approx 10 d ago, patient went into narrow complex tachycardia (HR 170s) and hypotension (70/20) in which patient was given fluids (gatorade), digoxin, and levophed at home with improvement in sx (home doctors: Dr Kush Maya).  Patient currently on levophed w HR 50  - holding midodrine, digoxin and toprol for now    #HLD   home meds: Lipitor 10mg  - c/w home meds    GI  #PEG  Patient w PEG tube placed iso dysphagia. Patient w c/f asp pna  - holding tube feeds iso c/f aspiration   - nutrition consulted   - FSGq6 hrs      strict I/Os    Renal    Cystatin C 1.48, eGFR 38     Endo  #hypothyroidism  TSH & free T4 elevated.   Home med levothyroxine 25 mcg  - c/w home med    #NPO-  check FSG q6 hrs     Heme  #DAYSI  Patient w known DAYSI. As per Stefano, was suppose to be started on ferrous sulfate.   Hgb on admission: Hgb 9, MCV 77  No active signs of bleeding   Iron total 23, TIBC 197, % sat 12, ferritin 107  - daily CBC  - maintain active T&S    #DVT ppx  Patient w acute drop in Hgb without evidence of bleed  - will hold lovenox for now  - c/w SCD    MSK  #pressure wounds  In January, pt w  bleeding R hip pressure wound after debridement with wound care at home the day prior, s/p suture ligation of oozing vessels with surgery   stage 4 R thigh, L thigh , and thoracic- As per Stefano, patient underwent L thigh skin graft 3 weeks ago   On PE, purulence noted in sacrum c/f OM  ESR 45 elevated  - Wet and dry Kerlix packing daily. Secure with clean gauze/abdominal pad with tape on top.  - wound care consult  - per gen sx consult, no evidence of necrotizing wounds at present    ID  #SSTI   #pna  #pressure wounds  Patient w increased secretions and fever at home and was being treated for pna w Cefpodoxime and Doxycycline. Patient developed fevers on abx (Tmax 101) w increasing O2 requirements (at baseline, uses 1L NC during sleep), prompting them to seek medical attention. In ED, patient was saturating mid 70s on RA> 93% on NC>90s on HFNC. As per ED, pt became acutely hypoxic in which they believe to be 2/2 mucus plugging vs aspiration - s/p code w intubation  In terms of pressure wounds, patient w chronic wounds as described below. On PE, sacral ulcer w purulence   MRSA negative, rapid RVP negative, urine legionella & pneumonia negative. Procal elevated. dc'd azithromycin.   - holding tube feeds  - f/u sputum cx 4/5  - c/w abx: vanc, zosyn  - wound care consulted    PPX  F: D5/LR @ 100cc/hr   N: npo   GI PPx: pantoprazole 40 IV q24h  DVT PPx: SCDs   Dispo: MICU   Code Status: DNR/intubated  Patient is an 89 yo F w PMHx Alzheimer's dementia, cataracts, hypothyroidism, HLD, DAYSI, SVT, chronic pressure wounds, dysphagia (s/p PEG) who presented to [facility] ED iso AMS and hypoxia, found to have AHRF pna and s/p code (4 rounds of CPR on 4/5 before ROSC) iso hypoxia 2/2 mucus plug vs aspiration    Neuro   #Acute metabolic encephalopathy #AD #Anoxic brain injury w myoclonic jerks  Baseline mental status AAOX0, nonsensical speech.  Patient was more lethargic and with increasing O2 requirements, prompting arrival to ED. Likely anoxic brain injury with myoclonic jerks appreciated on 4/5, started on keppra load with keppra 500mg BID on 4/5  Home meds: trazodone 50mg BID, Aricept 10mg, Seroquel 50mg, memantine 10mg BID  Multifactorial iso sepsis, hypoxia w poor baseline mental status   While in the ED on 4/5, patient coded and had CPR x4 with ROSC and intubated and transferred to MICU.  TSH elevated, free T4 elevated.   CTH: No evidence of acute intracranial pathology.    Plan  - treat pna and IVF per GOC  - keppra 500mg BID IV for myoclonic jerks iso hypoxic brain injury  - holding home Alzheimer's meds for now     #FTT  Patient with physical frailty, disability (relies on family and HHA w all ADLs), impaired neuropsychiatric function (AD, baseline AAOx0)  - for malnutrition - thiamine 200mg IV for 5 d    Pulm   #AHRF #ongoing pna #mucus plugging  Patient w increased secretions and fever at home and was being treated for pna w Cefpodoxime (had not finished 10d course) and Doxycycline (completed 5d course). Patient developed fevers on abx (Tmax 101) w increasing O2 requirements (at baseline, uses 1L NC during sleep), prompting them to seek medical attention.   In ED, patient was saturating mid 70s on RA> 93% on NC>90s on HFNC. As per ED, pt became acutely hypoxic in which they believe to be 2/2 mucus plugging vs aspiration and coded s/p intubation and 4 rounds of CPR with ROSC obtained.   mechanical ventilation settings in ED:  VCAC/18/400/5/80  CT Chest: Small left apical hydropneumothorax. Left basilar atelectasis. Trace right pleural effusion with associated atelectasis. 6 mm indeterminate right middle lobe lung nodule (3:35). Bilateral patchy airspace opacities which could represent pneumonia.   Plan  - pseudomonas + on sputum cx - zosyn 4.5mg q8h for PsA dosing  - c/w mech ventilation for now - trial weaning off propofol  - do not re-intubate should patient be extubated per GOC    Cards  #septic shock #narrow complex tachycardia #hypotension  Patient meeting 4/4 SIRS criteria (T 101 at home, , RR 23, WBC 14.57) +s/p cardiac arrest, now requiring pressors   Home meds: Midodrine 10mg q6 hrs prn, Metoprolol 12.5mg BID, Digoxin 0.125mg BID, ASA 81mg    Plan  - complete levophed bag on 4/6, no further pressors as per GOC discussion 4/5   - hold all home cardio meds given prognosis/GOC    #HLD   home meds: Lipitor 10mg  Plan  - hold Lipitor given prognosis, lack of benefit for >10 year ASVD risk    GI  #PEG  Patient w PEG tube placed iso dysphagia. Patient w c/f asp pna on admission 4/5  Plan  - holding tube feeds iso c/f aspiration   - nutrition consulted   - FSG q6 hrs      strict I/Os    Renal    Cystatin C 1.48, eGFR 38     Endo  #hypothyroidism  TSH & free T4 elevated. Home med levothyroxine 25 mcg.     Plan  - c/w home med via PEG tube    #NPO  check FSG q6 hrs     Heme  #DAYSI  Patient w known DAYSI. As per Stefano, was suppose to be started on ferrous sulfate.   Hgb on admission: Hgb 9, MCV 77; No active signs of bleeding, Hgb drop to 6.0, has been receiving 100cc D5 LR  Iron total 23, TIBC 197, % sat 12, ferritin 107  Plan  - stop CBC - no more daily labs per GOC    #DVT ppx  Patient w acute drop in Hgb without evidence of bleed  - will hold DVTppx for now  - c/w SCD    MSK  #pressure wounds  In January, pt w  bleeding R hip pressure wound after debridement with wound care at home the day prior, s/p suture ligation of oozing vessels with surgery   stage 4 R thigh, L thigh , and thoracic- As per Stefano, patient underwent L thigh skin graft 3 weeks ago   On PE, purulence noted in sacrum c/f OM  ESR 45 elevated    Plan  - gen sx 4/5 consulted, no operative management, no concern for necrotizing wound  - Wet and dry Kerlix packing daily. Secure with clean gauze/abdominal pad with tape on top.  - wound care consult (4/5)    ID  #SSTI  #pna #pressure wounds  Patient w increased secretions and fever at home and was being treated for pna w Cefpodoxime and Doxycycline. Patient developed fevers on abx (Tmax 101) w increasing O2 requirements (at baseline, uses 1L NC during sleep), prompting them to seek medical attention. In ED, patient was saturating mid 70s on RA> 93% on NC>90s on HFNC. As per ED, pt became acutely hypoxic in which they believe to be 2/2 mucus plugging vs aspiration - s/p code w intubation  In terms of pressure wounds, patient w chronic wounds as described below. On PE, sacral ulcer w purulence   MRSA negative, rapid RVP negative, urine legionella & pneumonia negative. Procal elevated. dc'd azithromycin.   Plan  - holding tube feeds  - f/u sputum cx 4/5 with PsA  - c/w abx: zosyn 4.5mg from 4/5 -  - dc vanc given MRSA neg, no concern for necrotizing SSTI   - wound care consulted    PPX  F: D5/LR @ 100cc/hr   N: npo   GI PPx: pantoprazole 40 IV q24h  DVT PPx: SCDs   Dispo: MICU   Code Status: DNR/intubated

## 2025-04-07 NOTE — ADVANCED PRACTICE NURSE CONSULT - RECOMMEDATIONS
Recommend wet to damp dressings with Vashe wound cleanser, cover with dry dressings. Protect periwounds with Cavilon barrier wipes. Spoke with SIMONE Flores and house staff. Total time spent on encounter=50 minutes

## 2025-04-07 NOTE — CONSULT NOTE ADULT - PROBLEM SELECTOR RECOMMENDATION 3
Requiring mechanical ventilation  -weaning sedation to assess mental/respiratory status  -poor candidate for trach given poor prognosis/mentation

## 2025-04-07 NOTE — CONSULT NOTE ADULT - ASSESSMENT
88yo F with PMH of Alzheimer's Dementia, Hypothyroidism, HLD, SVT, and Anemia p/w fevers and found to have acute respiratory failure with cardiac arrest. Palliative consulted for complex medical decision making in the setting of critical illness.    ·	weaning sedation to assess mental/respiratory status, ordered PRN regimen (Morphine, Ativan, Robinul) in case of symptoms  ·	previous GOC notes reviewed, will reach out to family for further support and planning  ·	if patient were extubated, then would be hospice eligible

## 2025-04-07 NOTE — CONSULT NOTE ADULT - PROBLEM SELECTOR RECOMMENDATION 4
In the setting of respiratory failure  -supports limited life expectancy and poor prognosis  -unclear if there is anoxic injury

## 2025-04-07 NOTE — ADVANCED PRACTICE NURSE CONSULT - ASSESSMENT
Patient is an 89 yo F w PMHx Alzheimer's dementia, cataracts, hypothyroidism, HLD, DAYSI, SVT, chronic pressure injuries, dysphagia (s/p PEG) who presented to [facility] ED iso AMS and hypoxia, found to have AHRF pna and s/p code (4 rounds of CPR on 4/5 before ROSC) iso hypoxia 2/2 mucus plug vs aspiration. Pt presents with the following pressure injuries:    1. Right mid back unstageable, measures 5.5 x 2.5 cm, covered 100% with yellow slough, periwound erythema, moderate amount of serosanguinous drainage, slight periwound fluctulance  2. Left mid back Stage 4 measuring 5 x 2.4 x 0.5 cm with undermining from 8-10 o'clock, deepest at 9 o'clock, 1 cm, muscle visible in wound bed, periwound erythema, moderate amount of serosanguinous drainage. Scattered periwound denudement/areas of friction  3. Right trochanter stage 4, 4.5 x 7 x 2 cm, muscle visible, undermining from 11-2 o'clock, deepest at 1 o'clock, 3 cm, tunneling at 5 o'clock, 111.5 cm, moderate amount of serosanguinous drainage  4. Sacral stage 4 measuring 12.5 x 10 x 0.5 cm, undermining from 10-11 o'clock, deepest at 11 o'clock, 3 cm, muscle visible, scattered areas of fibrin, periwound denudement  5. Right hallux stage 4 measuring 2 x 1.5 x 0.5 cm, muscle visible, bleeds easily  6. Left lateral foot stage 4, 2 x 2 x 0.2 cm, muscle visible, bruised periwound  7. Right heel stage 4, 2 x 1.5 x 0.2 cm, muscle visible in wound bed  8. Right lateral foot unstageable, 1 x 2 cm, covered 90% with yellow slough  9. Skin tear to left medial foot    Paid c/g at bedside who stated that patient has a hospital bed and that family helps turn her in the bed. Pt currently being turned and repositioned with offloading pillows, offloading boots bilaterally.

## 2025-04-07 NOTE — CONSULT NOTE ADULT - PROBLEM SELECTOR RECOMMENDATION 6
Patient is DNR, MOLST in chart  -previous GOC notes reviewed, will reach out to family for further support and planning  -if patient were extubated, then would be hospice eligible

## 2025-04-07 NOTE — CONSULT NOTE ADULT - PROBLEM SELECTOR RECOMMENDATION 9
-Morphine 2mg IV q2h PRN for Moderate/Severe Pain or RR>22  -Ativan 0.25mg IV q4h PRN for Anxiety/Agitation  -Robinul 0.4mg IV q6h PRN for Excessive Secretions

## 2025-04-07 NOTE — PROGRESS NOTE ADULT - SUBJECTIVE AND OBJECTIVE BOX
SUBJECTIVE:  BERNARD RODRIGUEZ is doing well this morning. Today is hospital day 2d.     24 Hour Events:   - No acute overnight events.      MEDICATIONS:  STANDING MEDICATIONS  atorvastatin 10 milliGRAM(s) Oral every 24 hours  chlorhexidine 0.12% Liquid 15 milliLiter(s) Oral Mucosa every 12 hours  chlorhexidine 2% Cloths 1 Application(s) Topical <User Schedule>  dextrose 5% + lactated ringers. 1000 milliLiter(s) IV Continuous <Continuous>  fentaNYL   Infusion. 0.5 MICROgram(s)/kG/Hr IV Continuous <Continuous>  heparin   Injectable 5000 Unit(s) SubCutaneous every 12 hours  hydrocortisone sodium succinate Injectable 50 milliGRAM(s) IV Push every 6 hours  levETIRAcetam  IVPB 500 milliGRAM(s) IV Intermittent every 12 hours  levothyroxine 25 MICROGram(s) Oral every 24 hours  pantoprazole  Injectable 40 milliGRAM(s) IV Push every 24 hours  piperacillin/tazobactam IVPB.. 4.5 Gram(s) IV Intermittent every 8 hours  propofol Infusion 25 MICROgram(s)/kG/Min IV Continuous <Continuous>  thiamine Injectable 200 milliGRAM(s) IV Push daily  vancomycin  IVPB 750 milliGRAM(s) IV Intermittent every 24 hours    PRN MEDICATIONS    VITALS:   ICU Vital Signs Last 24 Hrs  T(C): 36.9 (07 Apr 2025 01:05), Max: 37.7 (06 Apr 2025 09:33)  T(F): 98.4 (07 Apr 2025 01:05), Max: 99.9 (06 Apr 2025 09:33)  HR: 70 (07 Apr 2025 06:15) (70 - 96)  BP: --  BP(mean): --  ABP: 81/34 (07 Apr 2025 05:00) (63/32 - 126/56)  ABP(mean): 48 (07 Apr 2025 05:00) (42 - 80)  RR: 14 (07 Apr 2025 06:15) (14 - 16)  SpO2: 95% (07 Apr 2025 06:15) (94% - 100%)    O2 Parameters below as of 07 Apr 2025 06:15  Patient On (Oxygen Delivery Method): ventilator    O2 Concentration (%): 50      PHYSICAL EXAM  General: intubated sedated, no distress   Cardio: S1,S2, normotensive; normal HR   Pulm: Nonlabored breathing  Abdomen: soft; non-distended; non-tender   Extremities: WWP, peripheral pulses appreciated    Wounds:   Sacral wound, stage 4, large area roughly 12-15 cm in length and 7cm in width with some mild surrounding erythema and one area of darker bruised tissue, no crepitus, no fluctuance, no induration     Lateral right thigh wound that is stage 4 with no surrounding erythema or purulence, roughly 6-7cm length and 4-5 cm width    Rectal tear noted with some stool present in the rectal vault and a rectal temperature probe       04-05-25 @ 07:01  -  04-06-25 @ 07:00  --------------------------------------------------------  IN: 2500.7 mL / OUT: 690 mL / NET: 1810.7 mL    04-06-25 @ 07:01  -  04-07-25 @ 06:28  --------------------------------------------------------  IN: 2606 mL / OUT: 440 mL / NET: 2166 mL      Last Bowel Movement: 05-Apr-2025 (04-05-25 @ 08:45)  Last Bowel Movement: 05-Apr-2025 (04-05-25 @ 03:40)    Orthostatic VS      VENT DATA:  Mode: AC/ CMV (Assist Control/ Continuous Mandatory Ventilation), RR (machine): 14, TV (machine): 250, FiO2: 50, PEEP: 6, ITime: 1, MAP: 10, PIP: 23    LABS:                        7.5    22.70 )-----------( 403      ( 06 Apr 2025 01:39 )             25.7     04-06    147[H]  |  106  |  31[H]  ----------------------------<  154[H]  4.6   |  30  |  0.64    Ca    7.7[L]      06 Apr 2025 01:39    TPro  5.3[L]  /  Alb  2.4[L]  /  TBili  0.2  /  DBili  x   /  AST  44[H]  /  ALT  32  /  AlkPhos  75  04-06      Urinalysis Basic - ( 06 Apr 2025 01:39 )    Color: x / Appearance: x / SG: x / pH: x  Gluc: 154 mg/dL / Ketone: x  / Bili: x / Urobili: x   Blood: x / Protein: x / Nitrite: x   Leuk Esterase: x / RBC: x / WBC x   Sq Epi: x / Non Sq Epi: x / Bacteria: x      ABG - ( 06 Apr 2025 01:39 )  pH, Arterial: 7.39  pH, Blood: x     /  pCO2: 53    /  pO2: 159   / HCO3: 32    / Base Excess: 5.7   /  SaO2: 99.8        MICRO:    Culture - Sputum (collected 05 Apr 2025 17:56)  Source: Sputum Sputum  Gram Stain (06 Apr 2025 01:43):    Few polymorphonuclear leukocytes per low power field    No Squamous epithelial cells per low power field    Few Gram Negative Rods seen per oil power field    Few Gram Positive Rods seen per oil power field  Preliminary Report (06 Apr 2025 15:34):    Moderate Pseudomonas aeruginosa    Commensal gianfranco consistent with body site    Urinalysis with Rflx Culture (collected 05 Apr 2025 09:38)    Culture - Blood (collected 05 Apr 2025 00:40)  Source: Blood Blood-Peripheral  Preliminary Report (07 Apr 2025 06:01):    No growth at 48 Hours    Culture - Blood (collected 05 Apr 2025 00:40)  Source: Blood Blood-Peripheral  Preliminary Report (07 Apr 2025 06:01):    No growth at 48 Hours      Lines:  Central line:    A line placed 4/5 on L radial    Intravenous Access:   Indwelling Urethral Catheter:     Connect To:  Straight Drainage/Gravity    Indication:  Urine Output Monitoring in Critically Ill (04-05-25 @ 05:11) (not performed) [Active]      Basic Metabolic Panel: AM Sched. Collection: 07-Apr-2025 05:30 (04-06-25 @ 20:52)  Phosphorus: AM Sched. Collection: 07-Apr-2025 05:30 (04-06-25 @ 09:22)  Magnesium: AM Sched. Collection: 07-Apr-2025 05:30 (04-06-25 @ 09:22)  Complete Blood Count: AM Sched. Collection: 07-Apr-2025 05:30 (04-06-25 @ 09:22)     SUBJECTIVE:  BERNARD RODRIGUEZ is doing well this morning. Today is hospital day 2d.     24 Hour Events:   - No acute overnight events.  - GOC noted    MEDICATIONS:  STANDING MEDICATIONS    dextrose 5% + lactated ringers. 1000 milliLiter(s) IV Continuous <Continuous>  fentaNYL   Infusion. 0.5 MICROgram(s)/kG/Hr IV Continuous <Continuous> 2 mcg  propofol Infusion 25 MICROgram(s)/kG/Min IV Continuous <Continuous> 40mcg    atorvastatin 10 milliGRAM(s) Oral every 24 hours  heparin   Injectable 5000 Unit(s) SubCutaneous every 12 hours  hydrocortisone sodium succinate Injectable 50 milliGRAM(s) IV Push every 6 hours  levETIRAcetam  IVPB 500 milliGRAM(s) IV Intermittent every 12 hours  levothyroxine 25 MICROGram(s) Oral every 24 hours  pantoprazole  Injectable 40 milliGRAM(s) IV Push every 24 hours  piperacillin/tazobactam IVPB.. 4.5 Gram(s) IV Intermittent every 8 hours  thiamine Injectable 200 milliGRAM(s) IV Push daily  vancomycin  IVPB 750 milliGRAM(s) IV Intermittent every 24 hours    PRN MEDICATIONS    VITALS:   ICU Vital Signs Last 24 Hrs  T(C): 36.9 (07 Apr 2025 01:05), Max: 37.7 (06 Apr 2025 09:33)  T(F): 98.4 (07 Apr 2025 01:05), Max: 99.9 (06 Apr 2025 09:33)  HR: 70 (07 Apr 2025 06:15) (70 - 96)  BP: --  BP(mean): --  ABP: 81/34 (07 Apr 2025 05:00) (63/32 - 126/56)  ABP(mean): 48 (07 Apr 2025 05:00) (42 - 80)  RR: 14 (07 Apr 2025 06:15) (14 - 16)  SpO2: 95% (07 Apr 2025 06:15) (94% - 100%)    O2 Parameters below as of 07 Apr 2025 06:15  Patient On (Oxygen Delivery Method): ventilator    O2 Concentration (%): 50      PHYSICAL EXAM  General: intubated sedated, no distress   Cardio: S1,S2, normotensive; normal HR   Pulm: Nonlabored breathing  Abdomen: soft; non-distended; non-tender   Extremities: WWP, peripheral pulses appreciated    Wounds:   Sacral wound, stage 4, large area roughly 12-15 cm in length and 7cm in width with some mild surrounding erythema and one area of darker bruised tissue, no crepitus, no fluctuance, no induration     Lateral right thigh wound that is stage 4 with no surrounding erythema or purulence, roughly 6-7cm length and 4-5 cm width    Rectal tear noted with some stool present in the rectal vault and a rectal temperature probe       04-05-25 @ 07:01  -  04-06-25 @ 07:00  --------------------------------------------------------  IN: 2500.7 mL / OUT: 690 mL / NET: 1810.7 mL    04-06-25 @ 07:01  -  04-07-25 @ 06:28  --------------------------------------------------------  IN: 2606 mL / OUT: 440 mL / NET: 2166 mL      Last Bowel Movement: 05-Apr-2025 (04-05-25 @ 08:45)  Last Bowel Movement: 05-Apr-2025 (04-05-25 @ 03:40)    Orthostatic VS      VENT DATA:  Mode: AC/ CMV (Assist Control/ Continuous Mandatory Ventilation), RR (machine): 14, TV (machine): 250, FiO2: 50, PEEP: 6, ITime: 1, MAP: 10, PIP: 23    LABS:                        7.5    22.70 )-----------( 403      ( 06 Apr 2025 01:39 )             25.7     04-06    147[H]  |  106  |  31[H]  ----------------------------<  154[H]  4.6   |  30  |  0.64    Ca    7.7[L]      06 Apr 2025 01:39    TPro  5.3[L]  /  Alb  2.4[L]  /  TBili  0.2  /  DBili  x   /  AST  44[H]  /  ALT  32  /  AlkPhos  75  04-06      Urinalysis Basic - ( 06 Apr 2025 01:39 )    Color: x / Appearance: x / SG: x / pH: x  Gluc: 154 mg/dL / Ketone: x  / Bili: x / Urobili: x   Blood: x / Protein: x / Nitrite: x   Leuk Esterase: x / RBC: x / WBC x   Sq Epi: x / Non Sq Epi: x / Bacteria: x      ABG - ( 06 Apr 2025 01:39 )  pH, Arterial: 7.39  pH, Blood: x     /  pCO2: 53    /  pO2: 159   / HCO3: 32    / Base Excess: 5.7   /  SaO2: 99.8        MICRO:    Culture - Sputum (collected 05 Apr 2025 17:56)  Source: Sputum Sputum  Gram Stain (06 Apr 2025 01:43):    Few polymorphonuclear leukocytes per low power field    No Squamous epithelial cells per low power field    Few Gram Negative Rods seen per oil power field    Few Gram Positive Rods seen per oil power field  Preliminary Report (06 Apr 2025 15:34):    Moderate Pseudomonas aeruginosa    Commensal gianfranco consistent with body site    Urinalysis with Rflx Culture (collected 05 Apr 2025 09:38)    Culture - Blood (collected 05 Apr 2025 00:40)  Source: Blood Blood-Peripheral  Preliminary Report (07 Apr 2025 06:01):    No growth at 48 Hours    Culture - Blood (collected 05 Apr 2025 00:40)  Source: Blood Blood-Peripheral  Preliminary Report (07 Apr 2025 06:01):    No growth at 48 Hours      Lines:  Central line:    A line placed 4/5 on L radial    Intravenous Access:   Indwelling Urethral Catheter:     Connect To:  Straight Drainage/Gravity    Indication:  Urine Output Monitoring in Critically Ill (04-05-25 @ 05:11) (not performed) [Active]      Basic Metabolic Panel: AM Sched. Collection: 07-Apr-2025 05:30 (04-06-25 @ 20:52)  Phosphorus: AM Sched. Collection: 07-Apr-2025 05:30 (04-06-25 @ 09:22)  Magnesium: AM Sched. Collection: 07-Apr-2025 05:30 (04-06-25 @ 09:22)  Complete Blood Count: AM Sched. Collection: 07-Apr-2025 05:30 (04-06-25 @ 09:22)     SUBJECTIVE:  BERNARD RODRIGUEZ is doing well this morning. Today is hospital day 2d.     24 Hour Events:   - No acute overnight events.  - GOC noted  - hgb 6.0 but per goc via john paul, no intervention. will not re-check cbc, no active signs of bleeding on exam    MEDICATIONS:  STANDING MEDICATIONS    dextrose 5% + lactated ringers. 1000 milliLiter(s) IV Continuous <Continuous>  fentaNYL   Infusion. 0.5 MICROgram(s)/kG/Hr IV Continuous <Continuous> 2 mcg  propofol Infusion 25 MICROgram(s)/kG/Min IV Continuous <Continuous> 40mcg    atorvastatin 10 milliGRAM(s) Oral every 24 hours  heparin   Injectable 5000 Unit(s) SubCutaneous every 12 hours  hydrocortisone sodium succinate Injectable 50 milliGRAM(s) IV Push every 6 hours  levETIRAcetam  IVPB 500 milliGRAM(s) IV Intermittent every 12 hours  levothyroxine 25 MICROGram(s) Oral every 24 hours  pantoprazole  Injectable 40 milliGRAM(s) IV Push every 24 hours  piperacillin/tazobactam IVPB.. 4.5 Gram(s) IV Intermittent every 8 hours  thiamine Injectable 200 milliGRAM(s) IV Push daily  vancomycin  IVPB 750 milliGRAM(s) IV Intermittent every 24 hours    PRN MEDICATIONS    VITALS:   ICU Vital Signs Last 24 Hrs  T(C): 36.9 (07 Apr 2025 01:05), Max: 37.7 (06 Apr 2025 09:33)  T(F): 98.4 (07 Apr 2025 01:05), Max: 99.9 (06 Apr 2025 09:33)  HR: 70 (07 Apr 2025 06:15) (70 - 96)  BP: --  BP(mean): --  ABP: 81/34 (07 Apr 2025 05:00) (63/32 - 126/56)  ABP(mean): 48 (07 Apr 2025 05:00) (42 - 80)  RR: 14 (07 Apr 2025 06:15) (14 - 16)  SpO2: 95% (07 Apr 2025 06:15) (94% - 100%)    O2 Parameters below as of 07 Apr 2025 06:15  Patient On (Oxygen Delivery Method): ventilator    O2 Concentration (%): 50      PHYSICAL EXAM  General: intubated sedated, no distress   Cardio: S1,S2, normotensive; normal HR   Pulm: Nonlabored breathing  Abdomen: soft; non-distended; non-tender   Extremities: WWP, peripheral pulses appreciated    Wounds:   Sacral wound, stage 4, large area roughly 12-15 cm in length and 7cm in width with some mild surrounding erythema and one area of darker bruised tissue, no crepitus, no fluctuance, no induration     Lateral right thigh wound that is stage 4 with no surrounding erythema or purulence, roughly 6-7cm length and 4-5 cm width    Rectal tear noted with some stool present in the rectal vault and a rectal temperature probe       04-05-25 @ 07:01  -  04-06-25 @ 07:00  --------------------------------------------------------  IN: 2500.7 mL / OUT: 690 mL / NET: 1810.7 mL    04-06-25 @ 07:01  -  04-07-25 @ 06:28  --------------------------------------------------------  IN: 2606 mL / OUT: 440 mL / NET: 2166 mL      Last Bowel Movement: 05-Apr-2025 (04-05-25 @ 08:45)  Last Bowel Movement: 05-Apr-2025 (04-05-25 @ 03:40)    Orthostatic VS      VENT DATA:  Mode: AC/ CMV (Assist Control/ Continuous Mandatory Ventilation), RR (machine): 14, TV (machine): 250, FiO2: 50, PEEP: 6, ITime: 1, MAP: 10, PIP: 23    LABS:                        7.5    22.70 )-----------( 403      ( 06 Apr 2025 01:39 )             25.7     04-06    147[H]  |  106  |  31[H]  ----------------------------<  154[H]  4.6   |  30  |  0.64    Ca    7.7[L]      06 Apr 2025 01:39    TPro  5.3[L]  /  Alb  2.4[L]  /  TBili  0.2  /  DBili  x   /  AST  44[H]  /  ALT  32  /  AlkPhos  75  04-06      Urinalysis Basic - ( 06 Apr 2025 01:39 )    Color: x / Appearance: x / SG: x / pH: x  Gluc: 154 mg/dL / Ketone: x  / Bili: x / Urobili: x   Blood: x / Protein: x / Nitrite: x   Leuk Esterase: x / RBC: x / WBC x   Sq Epi: x / Non Sq Epi: x / Bacteria: x      ABG - ( 06 Apr 2025 01:39 )  pH, Arterial: 7.39  pH, Blood: x     /  pCO2: 53    /  pO2: 159   / HCO3: 32    / Base Excess: 5.7   /  SaO2: 99.8        MICRO:    Culture - Sputum (collected 05 Apr 2025 17:56)  Source: Sputum Sputum  Gram Stain (06 Apr 2025 01:43):    Few polymorphonuclear leukocytes per low power field    No Squamous epithelial cells per low power field    Few Gram Negative Rods seen per oil power field    Few Gram Positive Rods seen per oil power field  Preliminary Report (06 Apr 2025 15:34):    Moderate Pseudomonas aeruginosa    Commensal gianfranco consistent with body site    Urinalysis with Rflx Culture (collected 05 Apr 2025 09:38)    Culture - Blood (collected 05 Apr 2025 00:40)  Source: Blood Blood-Peripheral  Preliminary Report (07 Apr 2025 06:01):    No growth at 48 Hours    Culture - Blood (collected 05 Apr 2025 00:40)  Source: Blood Blood-Peripheral  Preliminary Report (07 Apr 2025 06:01):    No growth at 48 Hours      Lines:  Central line:    A line placed 4/5 on L radial    Intravenous Access:   Indwelling Urethral Catheter:     Connect To:  Straight Drainage/Gravity    Indication:  Urine Output Monitoring in Critically Ill (04-05-25 @ 05:11) (not performed) [Active]      Basic Metabolic Panel: AM Sched. Collection: 07-Apr-2025 05:30 (04-06-25 @ 20:52)  Phosphorus: AM Sched. Collection: 07-Apr-2025 05:30 (04-06-25 @ 09:22)  Magnesium: AM Sched. Collection: 07-Apr-2025 05:30 (04-06-25 @ 09:22)  Complete Blood Count: AM Sched. Collection: 07-Apr-2025 05:30 (04-06-25 @ 09:22)

## 2025-04-07 NOTE — CONSULT NOTE ADULT - PROBLEM SELECTOR RECOMMENDATION 5
FAST 7C+, hospice eligible  -limited life expectancy supported by bedbound status, pressure wounds, and cardiac arrest

## 2025-04-07 NOTE — CONSULT NOTE ADULT - CONSULT REASON
Concern for NSTI
Seizure-like episodes
Symptom Management and Complex Medical Decision Making/GOC in the setting of Advanced Illness

## 2025-04-08 NOTE — PROGRESS NOTE ADULT - TIME BILLING
Emotional Support/Supportive Care and Clarification of Potential Disease Trajectory related to Critical Illness  Assessment of Symptom [place] and Palliative regimen  Education and Monitoring of Opiates including titration and management of high risk medications  Exploration of GOC/Advanced Directives including HCP designation, code status, and hospice eligibility    Time inclusive of chart review, medication ordering, discussion with primary team, clinical documentation, and communication with family/caregiver

## 2025-04-08 NOTE — PROGRESS NOTE ADULT - ASSESSMENT
Patient is an 88 yo F w PMHx Alzheimer's dementia, cataracts, hypothyroidism, HLD, DAYSI, SVT, chronic pressure wounds, dysphagia (s/p PEG) who presented to [facility] ED iso AMS and hypoxia, found to have AHRF pna and s/p code (4 rounds of CPR on 4/5 before ROSC) iso hypoxia 2/2 mucus plug vs aspiration    Neuro   #Acute metabolic encephalopathy #AD #Anoxic brain injury w myoclonic jerks  Baseline mental status AAOX0, nonsensical speech.  Patient was more lethargic and with increasing O2 requirements, prompting arrival to ED. Likely anoxic brain injury with myoclonic jerks appreciated on 4/5, started on keppra load with keppra 500mg BID on 4/5  Home meds: trazodone 50mg BID, Aricept 10mg, Seroquel 50mg, memantine 10mg BID  Multifactorial iso sepsis, hypoxia w poor baseline mental status   While in the ED on 4/5, patient coded and had CPR x4 with ROSC and intubated and transferred to MICU.  TSH elevated, free T4 elevated.   CTH: No evidence of acute intracranial pathology.    Plan  - treat pna and IVF per GOC  - keppra 500mg BID IV for myoclonic jerks iso hypoxic brain injury  - holding home Alzheimer's meds for now     #FTT  Patient with physical frailty, disability (relies on family and HHA w all ADLs), impaired neuropsychiatric function (AD, baseline AAOx0)  - for malnutrition - thiamine 200mg IV for 5 d    Pulm   #AHRF #ongoing pna #mucus plugging  Patient w increased secretions and fever at home and was being treated for pna w Cefpodoxime (had not finished 10d course) and Doxycycline (completed 5d course). Patient developed fevers on abx (Tmax 101) w increasing O2 requirements (at baseline, uses 1L NC during sleep), prompting them to seek medical attention.   In ED, patient was saturating mid 70s on RA> 93% on NC>90s on HFNC. As per ED, pt became acutely hypoxic in which they believe to be 2/2 mucus plugging vs aspiration and coded s/p intubation and 4 rounds of CPR with ROSC obtained.   mechanical ventilation settings in ED:  VCAC/18/400/5/80  CT Chest: Small left apical hydropneumothorax. Left basilar atelectasis. Trace right pleural effusion with associated atelectasis. 6 mm indeterminate right middle lobe lung nodule (3:35). Bilateral patchy airspace opacities which could represent pneumonia.   Plan  - pseudomonas + on sputum cx - zosyn 4.5mg q8h for PsA dosing  - c/w mech ventilation for now - trial weaning off propofol  - do not re-intubate should patient be extubated per GOC    Cards  #septic shock #narrow complex tachycardia #hypotension  Patient meeting 4/4 SIRS criteria (T 101 at home, , RR 23, WBC 14.57) +s/p cardiac arrest, now requiring pressors   Home meds: Midodrine 10mg q6 hrs prn, Metoprolol 12.5mg BID, Digoxin 0.125mg BID, ASA 81mg    Plan  - complete levophed bag on 4/6, no further pressors as per GOC discussion 4/5   - hold all home cardio meds given prognosis/GOC    #HLD   home meds: Lipitor 10mg  Plan  - hold Lipitor given prognosis, lack of benefit for >10 year ASVD risk    GI  #PEG  Patient w PEG tube placed iso dysphagia. Patient w c/f asp pna on admission 4/5  Plan  - holding tube feeds iso c/f aspiration   - nutrition consulted   - FSG q6 hrs      strict I/Os    Renal    Cystatin C 1.48, eGFR 38     Endo  #hypothyroidism  TSH & free T4 elevated. Home med levothyroxine 25 mcg.     Plan  - c/w home med via PEG tube    #NPO  check FSG q6 hrs     Heme  #DAYSI  Patient w known DAYSI. As per Stefano, was suppose to be started on ferrous sulfate.   Hgb on admission: Hgb 9, MCV 77; No active signs of bleeding, Hgb drop to 6.0, has been receiving 100cc D5 LR  Iron total 23, TIBC 197, % sat 12, ferritin 107  Plan  - stop CBC - no more daily labs per GOC    #DVT ppx  Patient w acute drop in Hgb without evidence of bleed  - will hold DVTppx for now  - c/w SCD    MSK  #pressure wounds  In January, pt w  bleeding R hip pressure wound after debridement with wound care at home the day prior, s/p suture ligation of oozing vessels with surgery   stage 4 R thigh, L thigh , and thoracic- As per Stefano, patient underwent L thigh skin graft 3 weeks ago   On PE, purulence noted in sacrum c/f OM  ESR 45 elevated    Plan  - gen sx 4/5 consulted, no operative management, no concern for necrotizing wound  - Wet and dry Kerlix packing daily. Secure with clean gauze/abdominal pad with tape on top.  - wound care consult (4/5)    ID  #SSTI  #pna #pressure wounds  Patient w increased secretions and fever at home and was being treated for pna w Cefpodoxime and Doxycycline. Patient developed fevers on abx (Tmax 101) w increasing O2 requirements (at baseline, uses 1L NC during sleep), prompting them to seek medical attention. In ED, patient was saturating mid 70s on RA> 93% on NC>90s on HFNC. As per ED, pt became acutely hypoxic in which they believe to be 2/2 mucus plugging vs aspiration - s/p code w intubation  In terms of pressure wounds, patient w chronic wounds as described below. On PE, sacral ulcer w purulence   MRSA negative, rapid RVP negative, urine legionella & pneumonia negative. Procal elevated. dc'd azithromycin.   Plan  - holding tube feeds  - f/u sputum cx 4/5 with PsA  - c/w abx: zosyn 4.5mg from 4/5 -  - dc vanc given MRSA neg, no concern for necrotizing SSTI   - wound care consulted    PPX  F: D5/LR @ 100cc/hr   N: npo   GI PPx: pantoprazole 40 IV q24h  DVT PPx: SCDs   Dispo: MICU   Code Status: DNR/intubated  Patient is an 89 yo F w PMHx Alzheimer's dementia, cataracts, hypothyroidism, HLD, DAYSI, SVT, chronic pressure wounds, dysphagia (s/p PEG) who presented to [facility] ED iso AMS and hypoxia, found to have AHRF pna and s/p code (4 rounds of CPR on 4/5 before ROSC) iso hypoxia 2/2 mucus plug vs aspiration    Neuro   #Acute metabolic encephalopathy #AD #Anoxic brain injury w myoclonic jerks  Baseline mental status AAOX0, nonsensical speech.  Patient was more lethargic and with increasing O2 requirements, prompting arrival to ED. Likely anoxic brain injury with myoclonic jerks appreciated on 4/5, started on keppra load with keppra 500mg BID on 4/5  Home meds: trazodone 50mg BID, Aricept 10mg, Seroquel 50mg, memantine 10mg BID  Multifactorial iso sepsis, hypoxia w poor baseline mental status   While in the ED on 4/5, patient coded and had CPR x4 with ROSC and intubated and transferred to MICU.  TSH elevated, free T4 elevated.   CTH: No evidence of acute intracranial pathology.    Plan  - treat pna and IVF per GOC  - keppra 500mg BID IV for myoclonic jerks??  - holding home Alzheimer's meds for now     #FTT  Patient with physical frailty, disability (relies on family and HHA w all ADLs), impaired neuropsychiatric function (AD, baseline AAOx0)  - for malnutrition - thiamine 200mg IV for 5 d    Pulm   #AHRF #ongoing pna #mucus plugging  Patient w increased secretions and fever at home and was being treated for pna w Cefpodoxime (had not finished 10d course) and Doxycycline (completed 5d course). Patient developed fevers on abx (Tmax 101) w increasing O2 requirements (at baseline, uses 1L NC during sleep), prompting them to seek medical attention.   In ED, patient was saturating mid 70s on RA> 93% on NC>90s on HFNC. As per ED, pt became acutely hypoxic in which they believe to be 2/2 mucus plugging vs aspiration and coded s/p intubation and 4 rounds of CPR with ROSC obtained.   mechanical ventilation settings in ED:  VCAC/18/400/5/80  CT Chest: Small left apical hydropneumothorax. Left basilar atelectasis. Trace right pleural effusion with associated atelectasis. 6 mm indeterminate right middle lobe lung nodule (3:35). Bilateral patchy airspace opacities which could represent pneumonia.   Plan  - pseudomonas + on sputum cx - zosyn 4.5mg q8h for PsA dosing  - do not re-intubate should patient be extubated per GOC    Cards  #septic shock #narrow complex tachycardia #hypotension  Patient meeting 4/4 SIRS criteria (T 101 at home, , RR 23, WBC 14.57) +s/p cardiac arrest, now requiring pressors   Home meds: Midodrine 10mg q6 hrs prn, Metoprolol 12.5mg BID, Digoxin 0.125mg BID, ASA 81mg    Plan  - complete levophed bag on 4/6, no further pressors as per GOC discussion 4/5   - hold all home cardio meds given prognosis/GOC    #HLD   home meds: Lipitor 10mg  Plan  - hold Lipitor given prognosis, lack of benefit for >10 year ASVD risk    GI  #PEG  Patient w PEG tube placed iso dysphagia. Patient w c/f asp pna on admission 4/5  Plan  - holding tube feeds iso c/f aspiration   - nutrition consulted   - FSG q6 hrs      strict I/Os    Renal    Cystatin C 1.48, eGFR 38     Endo  #hypothyroidism  TSH & free T4 elevated. Home med levothyroxine 25 mcg.     Plan  - c/w home med via PEG tube    #NPO  check FSG q6 hrs     Heme  #DAYSI  Patient w known DAYSI. As per Stefano, was suppose to be started on ferrous sulfate.   Hgb on admission: Hgb 9, MCV 77; No active signs of bleeding, Hgb drop to 6.0, has been receiving 100cc D5 LR  Iron total 23, TIBC 197, % sat 12, ferritin 107  Plan  - stop CBC - no more daily labs per GOC    #DVT ppx  Patient w acute drop in Hgb without evidence of bleed  - will hold DVTppx for now  - c/w SCD    MSK  #pressure wounds  In January, pt w  bleeding R hip pressure wound after debridement with wound care at home the day prior, s/p suture ligation of oozing vessels with surgery   stage 4 R thigh, L thigh , and thoracic- As per Stefano, patient underwent L thigh skin graft 3 weeks ago   On PE, purulence noted in sacrum c/f OM  ESR 45 elevated    Plan  - gen sx 4/5 consulted, no operative management, no concern for necrotizing wound  - Wet and dry Kerlix packing daily. Secure with clean gauze/abdominal pad with tape on top.  - wound care consult (4/5)    ID  #SSTI  #pna #pressure wounds  Patient w increased secretions and fever at home and was being treated for pna w Cefpodoxime and Doxycycline. Patient developed fevers on abx (Tmax 101) w increasing O2 requirements (at baseline, uses 1L NC during sleep), prompting them to seek medical attention. In ED, patient was saturating mid 70s on RA> 93% on NC>90s on HFNC. As per ED, pt became acutely hypoxic in which they believe to be 2/2 mucus plugging vs aspiration - s/p code w intubation  In terms of pressure wounds, patient w chronic wounds as described below. On PE, sacral ulcer w purulence   MRSA negative, rapid RVP negative, urine legionella & pneumonia negative. Procal elevated. dc'd azithromycin.   Plan  - holding tube feeds  - f/u sputum cx 4/5 with PsA  - c/w abx: zosyn 4.5mg from 4/5 -  - dc vanc given MRSA neg, no concern for necrotizing SSTI   - wound care consulted    PPX  F: D5/LR @ 100cc/hr   N: npo   GI PPx: pantoprazole 40 IV q24h  DVT PPx: SCDs   Dispo: MICU   Code Status: DNR/intubated

## 2025-04-08 NOTE — PROGRESS NOTE ADULT - CRITICAL CARE ATTENDING COMMENT
Advanced end stage dementia s/p PEG with aspiration pneumonia leading to hypoxemia s/p PEA arrest s/p ROSC. Mental status is probably at baseline now but difficult to tell, and unclear if there is anoxic brain injury. Per GOC discussion will not escalate care; no labs and will be DNI once extubated. Rest as per above. Advanced end stage dementia s/p PEG with aspiration pneumonia leading to hypoxemia s/p PEA arrest s/p ROSC. Mental status is probably at baseline now but difficult to tell, and unclear if there is anoxic brain injury. CT chest from 4/5 with hydropneumothorax on right; chest tube not placed as would not be in line with GOC. Per GOC discussion will not escalate care; no labs and will be DNI once extubated. Rest as per above.

## 2025-04-08 NOTE — PROCEDURE NOTE - NSUS ED INFORMED CONSENT1
Benefits, risks, and possible complications of procedure explained to patient/caregiver who verbalized understanding and gave verbal consent. son at bedside/This was an emergent procedure and consent was implied.

## 2025-04-08 NOTE — PROGRESS NOTE ADULT - SUBJECTIVE AND OBJECTIVE BOX
[facility]  Omero Lawson, Palliative Care Attending  Contact Info: Call [phone] (HEAL Line) or message on Microsoft Teams (Omero Lawson)    SUBJECTIVE AND OBJECTIVE:  INTERVAL HPI/OVERNIGHT EVENTS: Interval events noted. Unable to participate in interview due to encephalopathy. See patient's PRN use for the past 24hrs noted below. Comprehensive symptom assessment and GOC exploration as noted below. Extensive time spent discussing plan of care with son.    ALLERGIES:  No Known Allergies    MEDICATIONS  (STANDING):  chlorhexidine 0.12% Liquid 15 milliLiter(s) Oral Mucosa every 12 hours  chlorhexidine 2% Cloths 1 Application(s) Topical <User Schedule>  dextrose 5% + lactated ringers. 1000 milliLiter(s) (100 mL/Hr) IV Continuous <Continuous>  fentaNYL   Infusion 0.5 MICROgram(s)/kG/Hr (2.44 mL/Hr) IV Continuous <Continuous>  hydrocortisone sodium succinate Injectable 50 milliGRAM(s) IV Push every 6 hours  levETIRAcetam  IVPB 500 milliGRAM(s) IV Intermittent every 12 hours  levothyroxine 25 MICROGram(s) Oral every 24 hours  pantoprazole  Injectable 40 milliGRAM(s) IV Push every 24 hours  piperacillin/tazobactam IVPB.. 4.5 Gram(s) IV Intermittent every 8 hours  thiamine Injectable 200 milliGRAM(s) IV Push daily    MEDICATIONS  (PRN):  glycopyrrolate Injectable 0.4 milliGRAM(s) IV Push every 6 hours PRN Secretions  LORazepam   Injectable 0.25 milliGRAM(s) IV Push every 4 hours PRN Anxiety  morphine  - Injectable 2 milliGRAM(s) IV Push every 2 hours PRN Moderate Pain (4 - 6), Severe Pain (7 - 10), Respiratory rate greater than 22    Analgesic Use (Scheduled and PRNs) for past 24 hours:  levETIRAcetam  IVPB   400 mL/Hr IV Intermittent (04-08-25 @ 10:54)    ITEMS UNCHECKED ARE NOT PRESENT  PRESENT SYMPTOMS/REVIEW OF SYSTEMS: [x]Unable to obtain due to poor mentation   Source if other than patient:  []Family   []Team     Pain: [] yes [x] no - see PAINAD  QOL impact -   Location -                    Aggravating Factors -  Quality -  Radiation -  Timing -  Severity (0-10 scale) -   Minimal Acceptable Level (0-10 scale) -    Other Symptoms: See ESAS Section Below  [x]All other review of systems negative     GENERAL:  [x] NAD []Alert [x]Lethargic  []Cachexia  []Unarousable  []Verbal  [x]Non-Verbal  BEHAVIORAL:   []Anxiety  [x]Delirium []Agitation []Cooperative [x]Oriented x0  HEENT:  []Normal  [] Moist Mucous Membranes []Dry mouth   [x]ET Tube/Trach  []Oral lesions  PULMONARY:   []Clear []Tachypnea  []Audible excessive secretions  [x]Normal Work of Breathing []Labored Breathing  [x]Rhonchi []Crackles []Wheezing  CARDIOVASCULAR:    [x]Regular Rate []Regular Rhythm []Irregular []Tachy  []Rufino  GASTROINTESTINAL:  [x]Soft  []Distended   [x]+BS  []Non tender []Tender  []PEG []OGT/ NGT  Last BM:  GENITOURINARY:  []Normal [x] Incontinent   []Oliguria/Anuria   []Carnes  MUSCULOSKELETAL:   []Normal Extremities  [x]Weakness  [x]Bed/Wheelchair bound []Edema  NEUROLOGIC:   []No focal deficits  [x]Cognitive impairment  [x]Dysphagia []Dysarthria []Paresis [x]Encephalopathic  SKIN:   []Normal   [x]Pressure ulcer(s)  []Rash    CRITICAL CARE:  [ ]Shock Present  [ ]Septic [ ]Cardiogenic [ ]Neurologic [ ]Hypovolemic  [ ] Vasopressors [ ]Inotropes   [x]Respiratory failure present [x]Mechanical Ventilation [ ]Non-invasive ventilatory support [ ]High-Flow  [x]Acute  [ ]Chronic [x]Hypoxic  [ ]Hypercarbic   [ ]Other organ failure    Vital Signs Last 24 Hrs  T(C): 37.2 (08 Apr 2025 17:56), Max: 37.2 (08 Apr 2025 17:56)  T(F): 99 (08 Apr 2025 17:56), Max: 99 (08 Apr 2025 17:56)  HR: 100 (08 Apr 2025 21:00) (83 - 114)  BP: --  BP(mean): --  RR: 18 (08 Apr 2025 21:00) (14 - 30)  SpO2: 95% (08 Apr 2025 21:00) (92% - 100%)    Parameters below as of 08 Apr 2025 22:00  Patient On (Oxygen Delivery Method): ventilator  O2 Concentration (%): 37    LABS: Personally reviewed and interpreted                      6.0    20.23 )-----------( 274      ( 07 Apr 2025 05:30 )             20.8   04-07    142  |  103  |  28[H]  ----------------------------<  163[H]  3.3[L]   |  29  |  0.62    Ca    7.4[L]      07 Apr 2025 05:30  Phos  2.8     04-07  Mg     2.2     04-07    RADIOLOGY & ADDITIONAL STUDIES: Personally reviewed and interpreted  None new    DISCUSSION OF CASE: Son - to provide updates and emotional support; Primary Team/RN - to discuss plan of care

## 2025-04-08 NOTE — PROGRESS NOTE ADULT - ASSESSMENT
88yo F with PMH of Alzheimer's Dementia, Hypothyroidism, HLD, SVT, and Anemia p/w fevers and found to have acute respiratory failure with cardiac arrest. Palliative consulted for complex medical decision making in the setting of critical illness.    ·	discussion with son about potential disease trajectories including possible extubation, will need further discussion if patient is not medically extubatable as compassionate withdrawal would not be in line with nancy tradition  ·	family in agreement with current care and no escalation  ·	if extubation is not "medically" indicated and family is not able to amenable to withdrawal of care, can explore transition to LTACH in [place] that has accepted orally intubated patients

## 2025-04-08 NOTE — PROGRESS NOTE ADULT - SUBJECTIVE AND OBJECTIVE BOX
SUBJECTIVE:  BERNARD RODRIGUEZ is doing well this morning. Today is hospital day 3d.     24 Hour Events:   - No acute overnight events.  - O/n: RUCHI  4/7: dc heparin, Hgb 6.0 per GOC no mror labs and no intervention ,dc statin given prognosis, ustrep neg. Palliative to f/u with HCP. Propofol weaned off to assess for mental status. Dc'd Vanc. PsA sputum + sensitive to piptazo, continue.      MEDICATIONS:  STANDING MEDICATIONS  chlorhexidine 0.12% Liquid 15 milliLiter(s) Oral Mucosa every 12 hours  dextrose 5% + lactated ringers. 1000 milliLiter(s) IV Continuous <Continuous> @100cc  fentaNYL   Infusion 0.5 MICROgram(s)/kG/Hr IV Continuous <Continuous>  hydrocortisone sodium succinate Injectable 50 milliGRAM(s) IV Push every 6 hours  levETIRAcetam  IVPB 500 milliGRAM(s) IV Intermittent every 12 hours  levothyroxine 25 MICROGram(s) Oral every 24 hours  pantoprazole  Injectable 40 milliGRAM(s) IV Push every 24 hours  piperacillin/tazobactam IVPB.. 4.5 Gram(s) IV Intermittent every 8 hours  thiamine Injectable 200 milliGRAM(s) IV Push daily    PRN MEDICATIONS  glycopyrrolate Injectable 0.4 milliGRAM(s) IV Push every 6 hours PRN  LORazepam   Injectable 0.25 milliGRAM(s) IV Push every 4 hours PRN  morphine  - Injectable 2 milliGRAM(s) IV Push every 2 hours PRN    VITALS:   ICU Vital Signs Last 24 Hrs  T(C): 36.7 (08 Apr 2025 05:23), Max: 36.8 (07 Apr 2025 09:08)  T(F): 98.1 (08 Apr 2025 05:23), Max: 98.2 (07 Apr 2025 09:08)  HR: 85 (08 Apr 2025 06:00) (66 - 90)  BP: --  BP(mean): --  ABP: 128/46 (08 Apr 2025 06:00) (79/34 - 132/52)  ABP(mean): 71 (08 Apr 2025 06:00) (48 - 82)  RR: 14 (08 Apr 2025 06:00) (13 - 25)  SpO2: 99% (08 Apr 2025 06:00) (92% - 100%)    O2 Parameters below as of 08 Apr 2025 07:00  Patient On (Oxygen Delivery Method): ventilator    O2 Concentration (%): 40      PHYSICAL EXAM  General: intubated sedated, no distress   Cardio: S1,S2, normotensive; normal HR   Pulm: Nonlabored breathing  Abdomen: soft; non-distended; non-tender   Extremities: WWP, peripheral pulses appreciated    I&O's Detail      07 Apr 2025 07:01  -  08 Apr 2025 06:20  --------------------------------------------------------  IN:    dextrose 5% + lactated ringers: 2300 mL    FentaNYL: 41.1 mL    FentaNYL: 24 mL    IV PiggyBack: 200 mL    IV PiggyBack: 300 mL    Propofol: 35.1 mL  Total IN: 2900.2 mL    OUT:    Indwelling Catheter - Urethral (mL): 595 mL  Total OUT: 595 mL    Total NET: 2305.2 mL            04-06-25 @ 07:01  -  04-07-25 @ 07:00  --------------------------------------------------------  IN: 2970.2 mL / OUT: 470 mL / NET: 2500.2 mL    04-07-25 @ 07:01  -  04-08-25 @ 06:17  --------------------------------------------------------  IN: 2900.2 mL / OUT: 595 mL / NET: 2305.2 mL      Last Bowel Movement: 05-Apr-2025 (04-05-25 @ 08:45)  Last Bowel Movement: 05-Apr-2025 (04-05-25 @ 03:40)    Orthostatic VS      VENT DATA:  Mode: AC/ CMV (Assist Control/ Continuous Mandatory Ventilation), RR (machine): 14, TV (machine): 250, FiO2: 50, PEEP: 6, ITime: 1, MAP: 9, PIP: 19    LABS:                        6.0    20.23 )-----------( 274      ( 07 Apr 2025 05:30 )             20.8     04-07    142  |  103  |  28[H]  ----------------------------<  163[H]  3.3[L]   |  29  |  0.62    Ca    7.4[L]      07 Apr 2025 05:30  Phos  2.8     04-07  Mg     2.2     04-07        Urinalysis Basic - ( 07 Apr 2025 05:30 )    Color: x / Appearance: x / SG: x / pH: x  Gluc: 163 mg/dL / Ketone: x  / Bili: x / Urobili: x   Blood: x / Protein: x / Nitrite: x   Leuk Esterase: x / RBC: x / WBC x   Sq Epi: x / Non Sq Epi: x / Bacteria: x              MICRO:    Culture - Sputum (collected 05 Apr 2025 17:56)  Source: Sputum Sputum  Gram Stain (06 Apr 2025 01:43):    Few polymorphonuclear leukocytes per low power field    No Squamous epithelial cells per low power field    Few Gram Negative Rods seen per oil power field    Few Gram Positive Rods seen per oil power field  Final Report (07 Apr 2025 19:38):    Moderate Pseudomonas aeruginosa    Commensal gianfranco consistent with body site  Organism: Pseudomonas aeruginosa  Pseudomonas aeruginosa (07 Apr 2025 19:38)  Organism: Pseudomonas aeruginosa (07 Apr 2025 19:38)  Organism: Pseudomonas aeruginosa (07 Apr 2025 19:38)    Urinalysis with Rflx Culture (collected 05 Apr 2025 09:38)      CARDS:        RADIOLOGY:        Lines:  Central line:              Date placed:             Indication:   Intravenous Access:   NG tube:   Carnes Catheter:   Indwelling Urethral Catheter:     Connect To:  Straight Drainage/Gravity    Indication:  Urine Output Monitoring in Critically Ill (04-05-25 @ 05:11) (not performed) [Active]

## 2025-04-09 NOTE — CHART NOTE - NSCHARTNOTEFT_GEN_A_CORE
Admitting Diagnosis:   Patient is a 89y old  Female who presents with a chief complaint of AHRF and septic shock (09 Apr 2025 14:18)      PAST MEDICAL & SURGICAL HISTORY:  HTN (hypertension)      Hypothyroid      HLD (hyperlipidemia)      Dementia      No significant past surgical history          Current Nutrition Order: NPO x4 days      PO Intake: N/A    GI Issues: Abdomen distended, +BS x4, last bowel movement 4/8     Pain: No non-verbal indicators     Skin Integrity: pressure injury stage I V sacrum, pressure injury stage IV R hip, pressure injury stage IV R heel, pressure injury unstageable bilateral upper back, pressure injury unstageable bilateral later foot, pressure injury stage IV R hallux, +1 generalized         04-08-25 @ 07:01  -  04-09-25 @ 07:00  --------------------------------------------------------  IN: 2946.1 mL / OUT: 485 mL / NET: 2461.1 mL    04-09-25 @ 07:01  -  04-09-25 @ 15:19  --------------------------------------------------------  IN: 859.8 mL / OUT: 125 mL / NET: 734.8 mL        Labs:         CAPILLARY BLOOD GLUCOSE      POCT Blood Glucose.: 159 mg/dL (09 Apr 2025 11:55)      Medications:  MEDICATIONS  (STANDING):  chlorhexidine 2% Cloths 1 Application(s) Topical <User Schedule>  dextrose 5% + lactated ringers. 1000 milliLiter(s) (100 mL/Hr) IV Continuous <Continuous>  fentaNYL   Infusion 0.5 MICROgram(s)/kG/Hr (2.44 mL/Hr) IV Continuous <Continuous>  hydrocortisone sodium succinate Injectable 50 milliGRAM(s) IV Push every 6 hours  levETIRAcetam  IVPB 500 milliGRAM(s) IV Intermittent every 12 hours  levothyroxine 25 MICROGram(s) Oral every 24 hours  pantoprazole  Injectable 40 milliGRAM(s) IV Push every 24 hours  piperacillin/tazobactam IVPB.. 4.5 Gram(s) IV Intermittent every 8 hours    MEDICATIONS  (PRN):  glycopyrrolate Injectable 0.4 milliGRAM(s) IV Push every 6 hours PRN Secretions  LORazepam   Injectable 1 milliGRAM(s) IV Push every 4 hours PRN Anxiety  morphine  - Injectable 2 milliGRAM(s) IV Push every 2 hours PRN Moderate Pain (4 - 6), Severe Pain (7 - 10), Respiratory rate greater than 22    Height for BMI (FEET)	5 Feet  Height for BMI (INCHES)	2 Inch(s)  Height for BMI (CENTIMETERS)	157.48 Centimeter(s)  Weight for BMI (lbs)	107.6 lb  Weight for BMI (kg)	48.8 kg  Body Mass Index	19.6    Weight Change: No new wt since admit. Recommend weigh pt weekly at minimum.     Estimated energy needs:   Weight used for calculations	dosing weight  Estimated Energy Needs Weight (lbs)	107.5 lb  Estimated Energy Needs Weight (kg)	48.8 kg  Estimated Energy Needs From (coty/kg)	25  Estimated Energy Needs To (coty/kg)	30	  Estimated Energy Needs Calculated From (coty/kg)	1220	  Estimated Energy Needs Calculated To (coty/kg)	1464  	  Weight used for calculations	dosing weight  Estimated Protein Needs Weight (lbs)	107.5 lb  Estimated Protein Needs Weight (kg)	48.8 kg  Estimated Protein Needs From (g/kg)	1.4  Estimated Protein Needs To (g/kg)	1.7  Estimated Protein Needs Calculated From (g/kg)	68.32  Estimated Protein Needs Calculated To (g/kg)	82.96  	  Estimated Fluid Needs Weight (lbs)	107.5 lb  Estimated Fluid Needs Weight (kg)	48.8 kg  Estimated Fluid Needs From (ml/kg)	30  Estimated Fluid Needs To (ml/kg)	35  Estimated Fluid Needs Calculated From (ml/kg)	1464  Estimated Fluid Needs Calculated To (ml/kg)	1708    Other Calculations	Estimated needs based on dosing wt as <100%  pounds / 50 kilograms in critical care setting. Needs adjusted for advanced age, wound healing, and clinical status.    Subjective: 89F with PMH of Alzheimer's dementia (AxOx0 baseline), cataracts, hypothyroidism, HLD, DAYSI, SVT, chronic pressure wounds, and dysphagia (status post PEG) who presented to [facility] ED in setting of AMS and hypoxia, found to have AHRF PNA, status post code in setting of hypoxia secondary to mucus plug vs aspiration, admitted to MICU for management. 4/5: Necrotizing sacral wound.     Pt care discussed in interdisciplinary care team rounds. Rx and labs reviewed. Pt remains intubated and sedate with vent set to VC-AC, MAP 89, fentanyl gtt, and no propofol at time of assessment. Ongoing goals of care discussions. No plans to feed at this time; pt is NPO day 4 at time of assessment. PEG at baseline and usual regimen Jevity 1.2 x5 237mL bolus feeds daily. Align nutrition with goals of care at all times; palliative following and rabbi involved. Prolonged NPO period and significant wounds; initiate enteral nutrition support as soon as medically possible if decided within goals of care -see recs below. No other reports GI distress or further nutritional concerns at this time. RDN to remain available, see recommendations below.     Previous Nutrition Diagnosis: Inadequate Oral Intake inability to meet nutrient demands via PO NPO, PEG dependent     Active [ x ]  Resolved [   ]    Goal: Pt will meet 75% or more of protein/energy needs via most appropriate route for nutrition.     Recommendations:  -Align nutrition with goals of care at all times   *If within goals of care, resume enteral nutrition support as soon as medically able    *Recommend Jevity 1.5 with goal rate of 50 ml/hr with LPS x1/day from [id number] to provide 900 ml tube feed, 1450 calories, 72 gProt., and 684 ml free water. This is 23.8 nonprotein calories and 1.48 gProt. per kg dosing wt 48.8 kg.   -Monitor pressor and propofol demands   *Goal MAP >65 and lactate <2.0 for generally safe provision of nutrition  -Maintain aspiration precautions at all times  -Weekly wts  -Monitor chemistry, GI function, and skin integrity     Risk Level: High [ x ] Moderate [   ] Low [   ]

## 2025-04-09 NOTE — PROGRESS NOTE ADULT - ASSESSMENT
88yo F with PMH of Alzheimer's Dementia, Hypothyroidism, HLD, SVT, and Anemia p/w fevers and found to have acute respiratory failure with cardiac arrest. Palliative consulted for complex medical decision making in the setting of critical illness.    ·	extensive discussion with son and Rabbi, will plan for extubation and no re-intubation  ·	fixed dose Fentanyl drip and PRNs ordered

## 2025-04-09 NOTE — PROGRESS NOTE ADULT - SUBJECTIVE AND OBJECTIVE BOX
SUBJECTIVE:  BERNARD RODRIGUEZ is doing well this morning. Today is hospital day 4d.     24 Hour Events:   - No acute overnight events.  O/N: family concerned about frequent myoclonus; 2mg IVP ativan, increased PRN dose to 1mg.    drips: on fentanyl 1 mcg    MEDICATIONS:  STANDING MEDICATIONS  chlorhexidine 0.12% Liquid 15 milliLiter(s) Oral Mucosa every 12 hours  chlorhexidine 2% Cloths 1 Application(s) Topical <User Schedule>  dextrose 5% + lactated ringers. 1000 milliLiter(s) IV Continuous <Continuous>  fentaNYL   Infusion 0.5 MICROgram(s)/kG/Hr IV Continuous <Continuous>  hydrocortisone sodium succinate Injectable 50 milliGRAM(s) IV Push every 6 hours  levETIRAcetam  IVPB 500 milliGRAM(s) IV Intermittent every 12 hours  levothyroxine 25 MICROGram(s) Oral every 24 hours  pantoprazole  Injectable 40 milliGRAM(s) IV Push every 24 hours  piperacillin/tazobactam IVPB.. 4.5 Gram(s) IV Intermittent every 8 hours  thiamine Injectable 200 milliGRAM(s) IV Push daily    PRN MEDICATIONS  glycopyrrolate Injectable 0.4 milliGRAM(s) IV Push every 6 hours PRN  LORazepam   Injectable 1 milliGRAM(s) IV Push every 4 hours PRN  morphine  - Injectable 2 milliGRAM(s) IV Push every 2 hours PRN    VITALS:   ICU Vital Signs Last 24 Hrs  T(C): 37.1 (09 Apr 2025 05:05), Max: 37.2 (08 Apr 2025 17:56)  T(F): 98.8 (09 Apr 2025 05:05), Max: 99 (08 Apr 2025 17:56)  HR: 85 (09 Apr 2025 04:00) (85 - 114)  BP: --  BP(mean): --  ABP: 110/49 (09 Apr 2025 04:00) (100/47 - 128/61)  ABP(mean): 70 (09 Apr 2025 04:00) (64 - 86)  RR: 17 (09 Apr 2025 04:00) (14 - 30)  SpO2: 96% (09 Apr 2025 04:00) (92% - 99%)    O2 Parameters below as of 09 Apr 2025 04:00  Patient On (Oxygen Delivery Method): ventilator    O2 Concentration (%): 40      PHYSICAL EXAM    04-07-25 @ 07:01  -  04-08-25 @ 07:00  --------------------------------------------------------  IN: 3000.2 mL / OUT: 610 mL / NET: 2390.2 mL    04-08-25 @ 07:01  -  04-09-25 @ 05:58  --------------------------------------------------------  IN: 2581.4 mL / OUT: 425 mL / NET: 2156.4 mL      Last Bowel Movement: 08-Apr-2025 (04-08-25 @ 21:00)  Last Bowel Movement: 05-Apr-2025 (04-05-25 @ 08:45)  Last Bowel Movement: 05-Apr-2025 (04-05-25 @ 03:40)    Orthostatic VS      VENT DATA:  Mode: AC/ CMV (Assist Control/ Continuous Mandatory Ventilation), RR (machine): 14, TV (machine): 250, FiO2: 40, PEEP: 6, ITime: 1, MAP: 11, PIP: 19    LABS:              ABG - ( 08 Apr 2025 16:16 )  pH, Arterial: 7.47  pH, Blood: x     /  pCO2: 39    /  pO2: 74    / HCO3: 28    / Base Excess: 4.5   /  SaO2: 97.0                    MICRO:    CARDS:        RADIOLOGY:        Lines:  Central line:              Date placed:             Indication:   Intravenous Access:   NG tube:   Carnes Catheter:   Indwelling Urethral Catheter:     Connect To:  Straight Drainage/Gravity    Indication:  Urine Output Monitoring in Critically Ill (04-05-25 @ 05:11) (not performed) [Active]         SUBJECTIVE:  BERNARD RODRIGUEZ is doing well this morning. Today is hospital day 4d.     24 Hour Events:   - No acute overnight events.  O/N: family concerned about frequent myoclonus; 2mg IVP ativan, increased PRN dose to 1mg.    drips: on fentanyl 1 mcg    MEDICATIONS:  STANDING MEDICATIONS  chlorhexidine 0.12% Liquid 15 milliLiter(s) Oral Mucosa every 12 hours  chlorhexidine 2% Cloths 1 Application(s) Topical <User Schedule>  dextrose 5% + lactated ringers. 1000 milliLiter(s) IV Continuous <Continuous>  fentaNYL   Infusion 0.5 MICROgram(s)/kG/Hr IV Continuous <Continuous>  hydrocortisone sodium succinate Injectable 50 milliGRAM(s) IV Push every 6 hours  levETIRAcetam  IVPB 500 milliGRAM(s) IV Intermittent every 12 hours  levothyroxine 25 MICROGram(s) Oral every 24 hours  pantoprazole  Injectable 40 milliGRAM(s) IV Push every 24 hours  piperacillin/tazobactam IVPB.. 4.5 Gram(s) IV Intermittent every 8 hours  thiamine Injectable 200 milliGRAM(s) IV Push daily    PRN MEDICATIONS  glycopyrrolate Injectable 0.4 milliGRAM(s) IV Push every 6 hours PRN  LORazepam   Injectable 1 milliGRAM(s) IV Push every 4 hours PRN  morphine  - Injectable 2 milliGRAM(s) IV Push every 2 hours PRN    VITALS:   ICU Vital Signs Last 24 Hrs  T(C): 37.1 (09 Apr 2025 05:05), Max: 37.2 (08 Apr 2025 17:56)  T(F): 98.8 (09 Apr 2025 05:05), Max: 99 (08 Apr 2025 17:56)  HR: 85 (09 Apr 2025 04:00) (85 - 114)  BP: --  BP(mean): --  ABP: 110/49 (09 Apr 2025 04:00) (100/47 - 128/61)  ABP(mean): 70 (09 Apr 2025 04:00) (64 - 86)  RR: 17 (09 Apr 2025 04:00) (14 - 30)  SpO2: 96% (09 Apr 2025 04:00) (92% - 99%)    O2 Parameters below as of 09 Apr 2025 04:00  Patient On (Oxygen Delivery Method): ventilator    O2 Concentration (%): 40      PHYSICAL EXAM    04-07-25 @ 07:01  -  04-08-25 @ 07:00  --------------------------------------------------------  IN: 3000.2 mL / OUT: 610 mL / NET: 2390.2 mL    04-08-25 @ 07:01  -  04-09-25 @ 05:58  --------------------------------------------------------  IN: 2581.4 mL / OUT: 425 mL / NET: 2156.4 mL      Last Bowel Movement: 08-Apr-2025 (04-08-25 @ 21:00)  Last Bowel Movement: 05-Apr-2025 (04-05-25 @ 08:45)  Last Bowel Movement: 05-Apr-2025 (04-05-25 @ 03:40)    Orthostatic VS      VENT DATA:  Mode: AC/ CMV (Assist Control/ Continuous Mandatory Ventilation), RR (machine): 14, TV (machine): 250, FiO2: 40, PEEP: 6, ITime: 1, MAP: 11, PIP: 19    LABS:              ABG - ( 08 Apr 2025 16:16 )  pH, Arterial: 7.47  pH, Blood: x     /  pCO2: 39    /  pO2: 74    / HCO3: 28    / Base Excess: 4.5   /  SaO2: 97.0      Lines:  Central line:              Date placed:             Indication:   Intravenous Access:   NG tube:   Carnes Catheter:   Indwelling Urethral Catheter:     Connect To:  Straight Drainage/Gravity    Indication:  Urine Output Monitoring in Critically Ill (04-05-25 @ 05:11) (not performed) [Active]

## 2025-04-09 NOTE — PROGRESS NOTE ADULT - ASSESSMENT
Patient is an 89 yo F w PMHx severe Alzheimer's dementia  (AAOX0 at baseline), cataracts, hypothyroidism, HLD, DAYSI, SVT, chronic pressure wounds, dysphagia (s/p PEG), s/p cardiorespiratory arrest 2/2 acute respiratory failure, sepsis, currently intubated, under ICU level of care. 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 Prognosis guarded. myoclonic activity persists despite keppra.     Recommendations:   - Start clonazepam 0.25mg bid   - Continue prn ativan push to reduce myoclonic activity for patient and family comfort  - c/w Keppra 500 mg IV q12h starting this PM      Discussed w attending

## 2025-04-09 NOTE — PROGRESS NOTE ADULT - ASSESSMENT
Patient is an 89 yo F w PMHx Alzheimer's dementia, cataracts, hypothyroidism, HLD, DAYSI, SVT, chronic pressure wounds, dysphagia (s/p PEG) who presented to [facility] ED iso AMS and hypoxia, found to have AHRF pna and s/p code (4 rounds of CPR on 4/5 before ROSC) iso hypoxia 2/2 mucus plug vs aspiration    Neuro   #Acute metabolic encephalopathy #AD #Anoxic brain injury w myoclonic jerks  Baseline mental status AAOX0, nonsensical speech.  Patient was more lethargic and with increasing O2 requirements, prompting arrival to ED. Likely anoxic brain injury with myoclonic jerks appreciated on 4/5, started on keppra load with keppra 500mg BID on 4/5  Home meds: trazodone 50mg BID, Aricept 10mg, Seroquel 50mg, memantine 10mg BID  Multifactorial iso sepsis, hypoxia w poor baseline mental status   While in the ED on 4/5, patient coded and had CPR x4 with ROSC and intubated and transferred to MICU.  TSH elevated, free T4 elevated.   CTH: No evidence of acute intracranial pathology.    Plan  - treat pna and IVF per GOC  - keppra 500mg BID IV for myoclonic jerks iso hypoxic brain injury  - holding home Alzheimer's meds for now     #FTT  Patient with physical frailty, disability (relies on family and HHA w all ADLs), impaired neuropsychiatric function (AD, baseline AAOx0)  - for malnutrition - thiamine 200mg IV for 5 d    Pulm   #AHRF #ongoing pna #mucus plugging  Patient w increased secretions and fever at home and was being treated for pna w Cefpodoxime (had not finished 10d course) and Doxycycline (completed 5d course). Patient developed fevers on abx (Tmax 101) w increasing O2 requirements (at baseline, uses 1L NC during sleep), prompting them to seek medical attention.   In ED, patient was saturating mid 70s on RA> 93% on NC>90s on HFNC. As per ED, pt became acutely hypoxic in which they believe to be 2/2 mucus plugging vs aspiration and coded s/p intubation and 4 rounds of CPR with ROSC obtained.   mechanical ventilation settings in ED:  VCAC/18/400/5/80  CT Chest: Small left apical hydropneumothorax. Left basilar atelectasis. Trace right pleural effusion with associated atelectasis. 6 mm indeterminate right middle lobe lung nodule (3:35). Bilateral patchy airspace opacities which could represent pneumonia.   Plan  - pseudomonas + on sputum cx - zosyn 4.5mg q8h for PsA dosing  - c/w mech ventilation for now - trial weaning off propofol  - do not re-intubate should patient be extubated per GOC    Cards  #septic shock #narrow complex tachycardia #hypotension  Patient meeting 4/4 SIRS criteria (T 101 at home, , RR 23, WBC 14.57) +s/p cardiac arrest, now requiring pressors   Home meds: Midodrine 10mg q6 hrs prn, Metoprolol 12.5mg BID, Digoxin 0.125mg BID, ASA 81mg    Plan  - complete levophed bag on 4/6, no further pressors as per GOC discussion 4/5   - hold all home cardio meds given prognosis/GOC    #HLD   home meds: Lipitor 10mg  Plan  - hold Lipitor given prognosis, lack of benefit for >10 year ASVD risk    GI  #PEG  Patient w PEG tube placed iso dysphagia. Patient w c/f asp pna on admission 4/5  Plan  - holding tube feeds iso c/f aspiration   - nutrition consulted   - FSG q6 hrs      strict I/Os    Renal    Cystatin C 1.48, eGFR 38     Endo  #hypothyroidism  TSH & free T4 elevated. Home med levothyroxine 25 mcg.     Plan  - c/w home med via PEG tube    #NPO  check FSG q6 hrs     Heme  #DAYSI  Patient w known DAYSI. As per Stefano, was suppose to be started on ferrous sulfate.   Hgb on admission: Hgb 9, MCV 77; No active signs of bleeding, Hgb drop to 6.0, has been receiving 100cc D5 LR  Iron total 23, TIBC 197, % sat 12, ferritin 107  Plan  - stop CBC - no more daily labs per GOC    #DVT ppx  Patient w acute drop in Hgb without evidence of bleed  - will hold DVTppx for now  - c/w SCD    MSK  #pressure wounds  In January, pt w  bleeding R hip pressure wound after debridement with wound care at home the day prior, s/p suture ligation of oozing vessels with surgery   stage 4 R thigh, L thigh , and thoracic- As per Stefano, patient underwent L thigh skin graft 3 weeks ago   On PE, purulence noted in sacrum c/f OM  ESR 45 elevated    Plan  - gen sx 4/5 consulted, no operative management, no concern for necrotizing wound  - Wet and dry Kerlix packing daily. Secure with clean gauze/abdominal pad with tape on top.  - wound care consult (4/5)    ID  #[email or web address]  #pna #pressure wounds  Patient w increased secretions and fever at home and was being treated for pna w Cefpodoxime and Doxycycline. Patient developed fevers on abx (Tmax 101) w increasing O2 requirements (at baseline, uses 1L NC during sleep), prompting them to seek medical attention. In ED, patient was saturating mid 70s on RA> 93% on NC>90s on HFNC. As per ED, pt became acutely hypoxic in which they believe to be 2/2 mucus plugging vs aspiration - s/p code w intubation  In terms of pressure wounds, patient w chronic wounds as described below. On PE, sacral ulcer w purulence   MRSA negative, rapid RVP negative, urine legionella & pneumonia negative. Procal elevated. dc'd azithromycin.   Plan  - holding tube feeds  - f/u sputum cx 4/5 with PsA  - c/w abx: zosyn 4.5mg from 4/5 -  - dc vanc given MRSA neg, no concern for necrotizing SSTI   - wound care consulted    PPX  F: D5/LR @ 100cc/hr   N: npo   GI PPx: pantoprazole 40 IV q24h  DVT PPx: SCDs   Dispo: MICU   Code Status: DNR/intubated  Patient is an 88 yo F w PMHx Alzheimer's dementia, cataracts, hypothyroidism, HLD, DAYSI, SVT, chronic pressure wounds, dysphagia (s/p PEG) who presented to [facility] ED iso AMS and hypoxia, found to have AHRF pna and s/p code (4 rounds of CPR on 4/5 before ROSC) iso hypoxia 2/2 mucus plug vs aspiration    Neuro   #Acute metabolic encephalopathy #AD #Anoxic brain injury w myoclonic jerks  Baseline mental status AAOX0, nonsensical speech.  Patient was more lethargic and with increasing O2 requirements, prompting arrival to ED. Likely anoxic brain injury with myoclonic jerks appreciated on 4/5, started on keppra load with keppra 500mg BID on 4/5  Home meds: trazodone 50mg BID, Aricept 10mg, Seroquel 50mg, memantine 10mg BID  Multifactorial iso sepsis, hypoxia w poor baseline mental status   While in the ED on 4/5, patient coded and had CPR x4 with ROSC and intubated and transferred to MICU.  TSH elevated, free T4 elevated.   CTH: No evidence of acute intracranial pathology.    Plan  - treat pna and IVF per GOC  - keppra 500mg BID IV for myoclonic jerks iso hypoxic brain injury  - holding home Alzheimer's meds for now     #FTT  Patient with physical frailty, disability (relies on family and HHA w all ADLs), impaired neuropsychiatric function (AD, baseline AAOx0)  - for malnutrition - thiamine 200mg IV for 5 d    Pulm   #AHRF #ongoing pna #mucus plugging  Patient w increased secretions and fever at home and was being treated for pna w Cefpodoxime (had not finished 10d course) and Doxycycline (completed 5d course). Patient developed fevers on abx (Tmax 101) w increasing O2 requirements (at baseline, uses 1L NC during sleep), prompting them to seek medical attention.   In ED, patient was saturating mid 70s on RA> 93% on NC>90s on HFNC. As per ED, pt became acutely hypoxic in which they believe to be 2/2 mucus plugging vs aspiration and coded s/p intubation and 4 rounds of CPR with ROSC obtained.   mechanical ventilation settings in ED:  VCAC/18/400/5/80  CT Chest: Small left apical hydropneumothorax. Left basilar atelectasis. Trace right pleural effusion with associated atelectasis. 6 mm indeterminate right middle lobe lung nodule (3:35). Bilateral patchy airspace opacities which could represent pneumonia.   Plan  - pseudomonas + on sputum cx - zosyn 4.5mg q8h for PsA dosing  - do not re-intubate should patient be extubated per GOC    Cards  #septic shock #narrow complex tachycardia #hypotension  Patient meeting 4/4 SIRS criteria (T 101 at home, , RR 23, WBC 14.57) +s/p cardiac arrest, now requiring pressors   Home meds: Midodrine 10mg q6 hrs prn, Metoprolol 12.5mg BID, Digoxin 0.125mg BID, ASA 81mg    Plan  - complete levophed bag on 4/6, no further pressors as per GOC discussion 4/5   - hold all home cardio meds given prognosis/GOC    #HLD   home meds: Lipitor 10mg  Plan  - hold Lipitor given prognosis, lack of benefit for >10 year ASVD risk    GI  #PEG  Patient w PEG tube placed iso dysphagia. Patient w c/f asp pna on admission 4/5  Plan  - holding tube feeds iso c/f aspiration   - nutrition consulted   - FSG q6 hrs      strict I/Os    Renal    Cystatin C 1.48, eGFR 38     Endo  #hypothyroidism  TSH & free T4 elevated. Home med levothyroxine 25 mcg.     Plan  - c/w home med via PEG tube    #NPO  check FSG q6 hrs     Heme  #DAYSI  Patient w known DAYSI. As per Stefano, was suppose to be started on ferrous sulfate.   Hgb on admission: Hgb 9, MCV 77; No active signs of bleeding, Hgb drop to 6.0, has been receiving 100cc D5 LR  Iron total 23, TIBC 197, % sat 12, ferritin 107  Plan  - stop CBC - no more daily labs per GOC    #DVT ppx  Patient w acute drop in Hgb without evidence of bleed  - will hold DVTppx for now  - c/w SCD    MSK  #pressure wounds  In January, pt w  bleeding R hip pressure wound after debridement with wound care at home the day prior, s/p suture ligation of oozing vessels with surgery   stage 4 R thigh, L thigh , and thoracic- As per Stefano, patient underwent L thigh skin graft 3 weeks ago   On PE, purulence noted in sacrum c/f OM  ESR 45 elevated    Plan  - gen sx 4/5 consulted, no operative management, no concern for necrotizing wound  - Wet and dry Kerlix packing daily. Secure with clean gauze/abdominal pad with tape on top.  - wound care consult (4/5)    ID  #[email or web address]  #pna #pressure wounds  Patient w increased secretions and fever at home and was being treated for pna w Cefpodoxime and Doxycycline. Patient developed fevers on abx (Tmax 101) w increasing O2 requirements (at baseline, uses 1L NC during sleep), prompting them to seek medical attention. In ED, patient was saturating mid 70s on RA> 93% on NC>90s on HFNC. As per ED, pt became acutely hypoxic in which they believe to be 2/2 mucus plugging vs aspiration - s/p code w intubation  In terms of pressure wounds, patient w chronic wounds as described below. On PE, sacral ulcer w purulence   MRSA negative, rapid RVP negative, urine legionella & pneumonia negative. Procal elevated. dc'd azithromycin.   Plan  - holding tube feeds  - f/u sputum cx 4/5 with PsA  - c/w abx: zosyn 4.5mg from 4/5 -  - dc vanc given MRSA neg, no concern for necrotizing SSTI   - wound care consulted    PPX  F: D5/LR @ 100cc/hr   N: npo   GI PPx: pantoprazole 40 IV q24h  DVT PPx: SCDs   Dispo: MICU   Code Status: DNR/intubated

## 2025-04-09 NOTE — PROGRESS NOTE ADULT - CONVERSATION DETAILS
Reviewed patient's hospital course and interval developments. Discussed advanced directives including code status, HCP completion, and hospice eligibility. Extensive discussion with son and Rabbi in anticipation of medical extubation. After deliberation and keeping with Rabbinical interpretations, agreed that extubation could proceed with no plans to re-intubate. Son is accepting of symptom management and wants to ensure patient is not in pain. If patient is able to wean off of support and remain stable, then we will explore the possibility of return home. Emphasized that there is no guarantee for survival, son expressed understanding.
Discussion with son about potential disease trajectories including possible extubation, will need further discussion if patient is not medically extubatable as compassionate withdrawal would not be in line with nancy tradition. Family in agreement with current care and no escalation. If extubation is not "medically" indicated and family is not able to amenable to withdrawal of care, can explore transition to LTACH in [place] that has accepted orally intubated patients.

## 2025-04-09 NOTE — PROGRESS NOTE ADULT - CRITICAL CARE ATTENDING COMMENT
Advanced end stage dementia s/p PEG with aspiration pneumonia leading to hypoxemia s/p PEA arrest s/p ROSC. Mental status is probably at baseline now but difficult to tell, and unclear if there is anoxic brain injury. CT chest from 4/5 with hydropneumothorax on right; chest tube not placed as would not be in line with GOC. Per GOC discussion will not escalate care; no labs. Successful SBT; extubated today. Will not reintubate if fails as per GOC. Appreciate palliative care recs. Rest as per above.

## 2025-04-09 NOTE — PROGRESS NOTE ADULT - SUBJECTIVE AND OBJECTIVE BOX
[facility] Geriatrics and Palliative Medicine  Omero Lawson, Palliative Care Attending  Contact Info: Call [phone] (HEAL Line) or message on Microsoft Teams (Omero Lawson)    SUBJECTIVE AND OBJECTIVE:  INTERVAL HPI/OVERNIGHT EVENTS: Interval events noted. Unable to participate in interview. Tolerating CPAP. See patient's PRN use for the past 24hrs noted below. Comprehensive symptom assessment and GOC exploration as noted below. Extensive time spent discussing plan of care with family and Rabbi. Will plan to extubate and not re-intubate after discussion with Rabbi.    ALLERGIES:  No Known Allergies    MEDICATIONS  (STANDING):  chlorhexidine 0.12% Liquid 15 milliLiter(s) Oral Mucosa every 12 hours  chlorhexidine 2% Cloths 1 Application(s) Topical <User Schedule>  dextrose 5% + lactated ringers. 1000 milliLiter(s) IV Continuous <Continuous>  fentaNYL   Infusion 0.5 MICROgram(s)/kG/Hr IV Continuous <Continuous>  hydrocortisone sodium succinate Injectable 50 milliGRAM(s) IV Push every 6 hours  levETIRAcetam  IVPB 500 milliGRAM(s) IV Intermittent every 12 hours  levothyroxine 25 MICROGram(s) Oral every 24 hours  pantoprazole  Injectable 40 milliGRAM(s) IV Push every 24 hours  piperacillin/tazobactam IVPB.. 4.5 Gram(s) IV Intermittent every 8 hours  thiamine Injectable 200 milliGRAM(s) IV Push daily    PRN MEDICATIONS  glycopyrrolate Injectable 0.4 milliGRAM(s) IV Push every 6 hours PRN  LORazepam   Injectable 1 milliGRAM(s) IV Push every 4 hours PRN  morphine  - Injectable 2 milliGRAM(s) IV Push every 2 hours PRN    Analgesic Use (Scheduled and PRNs) for past 24 hours:  levETIRAcetam  IVPB   400 mL/Hr IV Intermittent (04-09-25 @ 22:32)  morphine  - Injectable   2 milliGRAM(s) IV Push (04-09-25 @ 15:25)    ITEMS UNCHECKED ARE NOT PRESENT  PRESENT SYMPTOMS/REVIEW OF SYSTEMS: [x]Unable to obtain due to poor mentation   Source if other than patient:  []Family   []Team     Pain: [] yes [x] no - see PAINAD  QOL impact -   Location -                    Aggravating Factors -  Quality -  Radiation -  Timing -  Severity (0-10 scale) -   Minimal Acceptable Level (0-10 scale) -    Other Symptoms: See ESAS Section Below  [x]All other review of systems negative     GENERAL:  [x] NAD []Alert [x]Lethargic  []Cachexia  []Unarousable  []Verbal  [x]Non-Verbal  BEHAVIORAL:   []Anxiety  [x]Delirium []Agitation []Cooperative [x]Oriented x0  HEENT:  []Normal  [] Moist Mucous Membranes []Dry mouth   [x]ET Tube/Trach  []Oral lesions  PULMONARY:   []Clear []Tachypnea  []Audible excessive secretions  [x]Normal Work of Breathing []Labored Breathing  [x]Rhonchi []Crackles []Wheezing  CARDIOVASCULAR:    [x]Regular Rate []Regular Rhythm []Irregular []Tachy  []Rufino  GASTROINTESTINAL:  [x]Soft  []Distended   [x]+BS  []Non tender []Tender  []PEG []OGT/ NGT  Last BM:  GENITOURINARY:  []Normal [x] Incontinent   []Oliguria/Anuria   []Carnes  MUSCULOSKELETAL:   []Normal Extremities  [x]Weakness  [x]Bed/Wheelchair bound []Edema  NEUROLOGIC:   []No focal deficits  [x]Cognitive impairment  [x]Dysphagia []Dysarthria []Paresis [x]Encephalopathic  SKIN:   []Normal   [x]Pressure ulcer(s)  []Rash    CRITICAL CARE:  [ ]Shock Present  [ ]Septic [ ]Cardiogenic [ ]Neurologic [ ]Hypovolemic  [ ] Vasopressors [ ]Inotropes   [x]Respiratory failure present [x]Mechanical Ventilation [ ]Non-invasive ventilatory support [ ]High-Flow  [x]Acute  [ ]Chronic [x]Hypoxic  [ ]Hypercarbic   [ ]Other organ failure    Vital Signs Last 24 Hrs  T(C): 37.1 (09 Apr 2025 05:05), Max: 37.2 (08 Apr 2025 17:56)  T(F): 98.8 (09 Apr 2025 05:05), Max: 99 (08 Apr 2025 17:56)  HR: 85 (09 Apr 2025 04:00) (85 - 114)  BP: --  BP(mean): --  ABP: 110/49 (09 Apr 2025 04:00) (100/47 - 128/61)  ABP(mean): 70 (09 Apr 2025 04:00) (64 - 86)  RR: 17 (09 Apr 2025 04:00) (14 - 30)  SpO2: 96% (09 Apr 2025 04:00) (92% - 99%)    Parameters below as of 10 Apr 2025 11:00  Patient On (Oxygen Delivery Method): nasal cannula, high flow  O2 Flow (L/min): 60  O2 Concentration (%): 50    LABS: Personally reviewed and interpreted  None new    RADIOLOGY & ADDITIONAL STUDIES: Personally reviewed and interpreted  None new    DISCUSSION OF CASE: Son - to provide updates and emotional support; Primary Team/RN - to discuss plan of care

## 2025-04-09 NOTE — PROGRESS NOTE ADULT - SUBJECTIVE AND OBJECTIVE BOX
Epilepsy Progress Note    Interval History:  Increased myoclonic activity noted overnight, was given ativan 2mg    PAST MEDICAL & SURGICAL HISTORY:  HTN (hypertension)    Hypothyroid    HLD (hyperlipidemia)    Dementia    No significant past surgical history            Medications:  chlorhexidine 0.12% Liquid 15 milliLiter(s) Oral Mucosa every 12 hours  chlorhexidine 2% Cloths 1 Application(s) Topical <User Schedule>  dextrose 5% + lactated ringers. 1000 milliLiter(s) IV Continuous <Continuous>  fentaNYL   Infusion 0.5 MICROgram(s)/kG/Hr IV Continuous <Continuous>  glycopyrrolate Injectable 0.4 milliGRAM(s) IV Push every 6 hours PRN  hydrocortisone sodium succinate Injectable 50 milliGRAM(s) IV Push every 6 hours  levETIRAcetam  IVPB 500 milliGRAM(s) IV Intermittent every 12 hours  levothyroxine 25 MICROGram(s) Oral every 24 hours  LORazepam   Injectable 1 milliGRAM(s) IV Push every 4 hours PRN  morphine  - Injectable 2 milliGRAM(s) IV Push every 2 hours PRN  pantoprazole  Injectable 40 milliGRAM(s) IV Push every 24 hours  piperacillin/tazobactam IVPB.. 4.5 Gram(s) IV Intermittent every 8 hours      Vital Signs Last 24 Hrs  T(C): 37.1 (09 Apr 2025 14:01), Max: 37.2 (08 Apr 2025 17:56)  T(F): 98.8 (09 Apr 2025 14:01), Max: 99 (08 Apr 2025 17:56)  HR: 90 (09 Apr 2025 13:00) (82 - 110)  BP: --  BP(mean): --  RR: 21 (09 Apr 2025 13:00) (14 - 30)  SpO2: 98% (09 Apr 2025 13:00) (92% - 98%)    Parameters below as of 09 Apr 2025 13:00  Patient On (Oxygen Delivery Method): ventilator    O2 Concentration (%): 40    Neurological Exam:   Mental status: Unresponsive. On fentanyl gtt. CPAP setting on ventilator  Cranial nerves: ?surgical pupils unreactive 3mm. Roving eye movements.   Motor:  flaccid tone. No spontaneous movements.   Sensation: no withdrawal to noxious  Coordination: brief myoclonic movements of r shoulder  Reflexes: mute biceps triceps and plantar. no ankle clonus  Gait: MAY due to mental status    Labs:

## 2025-04-10 NOTE — PROGRESS NOTE ADULT - PROBLEM SELECTOR PLAN 1
-Morphine 2mg IV q2h PRN for Moderate/Severe Pain or RR>22  -Ativan 0.25mg IV q4h PRN for Anxiety/Agitation  -Robinul 0.4mg IV q6h PRN for Excessive Secretions
-Morphine 2mg IV q2h PRN for Moderate/Severe Pain or RR>22  -Ativan 1mg IV q4h PRN for Anxiety/Agitation  -Ativan 1mg IV q6h ATC  -Robinul 0.4mg IV q6h PRN for Excessive Secretions  -Fentanyl gtt at 1mcg/kg/hr
-Morphine 2mg IV q2h PRN for Moderate/Severe Pain or RR>22  -Ativan 1mg IV q4h PRN for Anxiety/Agitation  -Robinul 0.4mg IV q6h PRN for Excessive Secretions  -Fentanyl gtt at 0.5mcg/kg/hr

## 2025-04-10 NOTE — PROGRESS NOTE ADULT - PROBLEM SELECTOR PROBLEM 1
Dyspnea and respiratory abnormality

## 2025-04-10 NOTE — PROGRESS NOTE ADULT - NS ATTEST RISK PROBLEM GEN_ALL_CORE FT
as above
Acute Illness That Poses A Threat To Life  Chronic Illness With Severe Exacerbation/Progression  Decision Made To De-escalate Care Due To Poor Prognosis  Prescription Of Parenteral Controlled Substances
Acute Illness That Poses A Threat To Life  Chronic Illness With Severe Exacerbation/Progression  Decision Made To De-escalate Care Due To Poor Prognosis  Prescription Of Parenteral Controlled Substances

## 2025-04-10 NOTE — PROGRESS NOTE ADULT - ATTENDING COMMENTS
post anoxic myoclonus   Plan as above
Patient with anoxic brain injury with continued signs of myologic jerk. Will plan to continue treatment for infectious process with extensive discussion with family had and goal to transition to comfort measures. All other labs and imaging reviewed.
89 F advanced dementia s/p peg p/w PNA, cardiac arrest, shock  GOC discussion

## 2025-04-10 NOTE — PROGRESS NOTE ADULT - REASON FOR ADMISSION
AHRF and septic shock

## 2025-04-10 NOTE — PROGRESS NOTE ADULT - PROBLEM SELECTOR PLAN 6
Patient is DNR, MOLST in chart  -see GOC note above  -if patient were extubated, then would be hospice eligible

## 2025-04-10 NOTE — PROGRESS NOTE ADULT - NSPROGADDITIONALINFOA_GEN_ALL_CORE
In addition to the E/M visit, an advance care planning meeting was performed. Start time: 12:00PM; End time: 12:46PM; Total time: 46min. A face to face meeting to discuss advance care planning was held today regarding: BERNARD RODRIGUEZ. Primary decision maker: Patient is unable to participate in decision making; Alternate/surrogate: Son. Discussed advance directives including, but not limited to, healthcare proxy and code status. Decision regarding code status: DNR/DNI; Documentation completed today: [facility] note
In addition to the E/M visit, an advance care planning meeting was performed. Start time: 12:00PM; End time: 12:46PM; Total time: 46min. A face to face meeting to discuss advance care planning was held today regarding: BERNARD RODRIGUEZ. Primary decision maker: Patient is unable to participate in decision making; Alternate/surrogate: Son. Discussed advance directives including, but not limited to, healthcare proxy and code status. Decision regarding code status: DNR/DNI; Documentation completed today: [facility] note

## 2025-04-10 NOTE — PROGRESS NOTE ADULT - PROBLEM SELECTOR PLAN 3
Requiring mechanical ventilation  -apperas to be medically extubatable  -poor candidate for trach given poor prognosis/mentation
Requiring mechanical ventilation  -weaning sedation to assess mental/respiratory status  -poor candidate for trach given poor prognosis/mentation
Requiring mechanical ventilation  -apperas to be medically extubatable  -poor candidate for trach given poor prognosis/mentation

## 2025-04-10 NOTE — PROGRESS NOTE ADULT - ASSESSMENT
89yo F with PMH of Alzheimer's Dementia, Hypothyroidism, HLD, SVT, and Anemia p/w fevers and found to have acute respiratory failure with cardiac arrest. Palliative consulted for complex medical decision making in the setting of critical illness.    ·	symptom regimen adjusted, family wishes to emphasize comfort

## 2025-04-10 NOTE — PROGRESS NOTE ADULT - SUBJECTIVE AND OBJECTIVE BOX
[facility] Geriatrics and Palliative Medicine  Omero Lawson, Palliative Care Attending  Contact Info: Call [phone] (HEAL Line) or message on Microsoft Teams (Omero Lawson)    SUBJECTIVE AND OBJECTIVE:  INTERVAL HPI/OVERNIGHT EVENTS: Interval events noted. Tolerating HFNC. Will see if patient can be stpped down but appears tenuous. See patient's PRN use for the past 24hrs noted below. Comprehensive symptom assessment and GOC exploration as noted below. Extensive time spent discussing plan of care with son. No unexpected adverse effects of opiates noted.    ALLERGIES:  No Known Allergies    MEDICATIONS  (STANDING):  chlorhexidine 2% Cloths 1 Application(s) Topical <User Schedule>  dextrose 5% + lactated ringers. 1000 milliLiter(s) IV Continuous <Continuous>  fentaNYL   Infusion 0.5 MICROgram(s)/kG/Hr IV Continuous <Continuous>  hydrocortisone sodium succinate Injectable 50 milliGRAM(s) IV Push every 12 hours  levETIRAcetam  IVPB 500 milliGRAM(s) IV Intermittent every 12 hours  levothyroxine 25 MICROGram(s) Oral every 24 hours  pantoprazole  Injectable 40 milliGRAM(s) IV Push every 24 hours  piperacillin/tazobactam IVPB.. 4.5 Gram(s) IV Intermittent every 8 hours    PRN MEDICATIONS  glycopyrrolate Injectable 0.4 milliGRAM(s) IV Push every 6 hours PRN  LORazepam   Injectable 0.5 milliGRAM(s) IV Push every 4 hours PRN  morphine  - Injectable 2 milliGRAM(s) IV Push every 2 hours PRN      Analgesic Use (Scheduled and PRNs) for past 24 hours:  LORazepam   Injectable   1 milliGRAM(s) IV Push (04-10-25 @ 11:59)    ITEMS UNCHECKED ARE NOT PRESENT  PRESENT SYMPTOMS/REVIEW OF SYSTEMS: [x]Unable to obtain due to poor mentation   Source if other than patient:  []Family   []Team     Pain: [] yes [x] no - see PAINAD  QOL impact -   Location -                    Aggravating Factors -  Quality -  Radiation -  Timing -  Severity (0-10 scale) -   Minimal Acceptable Level (0-10 scale) -    Other Symptoms: See ESAS Section Below  [x]All other review of systems negative     GENERAL:  [x] NAD []Alert [x]Lethargic  []Cachexia  []Unarousable  []Verbal  [x]Non-Verbal  BEHAVIORAL:   []Anxiety  [x]Delirium []Agitation []Cooperative [x]Oriented x0  HEENT:  []Normal  [] Moist Mucous Membranes []Dry mouth   []ET Tube/Trach  []Oral lesions  PULMONARY:   []Clear []Tachypnea  []Audible excessive secretions  [x]Normal Work of Breathing []Labored Breathing  [x]Rhonchi []Crackles []Wheezing  CARDIOVASCULAR:    [x]Regular Rate []Regular Rhythm []Irregular []Tachy  []Rufino  GASTROINTESTINAL:  [x]Soft  []Distended   [x]+BS  []Non tender []Tender  []PEG []OGT/ NGT  Last BM:  GENITOURINARY:  []Normal [x] Incontinent   []Oliguria/Anuria   []Carnes  MUSCULOSKELETAL:   []Normal Extremities  [x]Weakness  [x]Bed/Wheelchair bound []Edema  NEUROLOGIC:   []No focal deficits  [x]Cognitive impairment  [x]Dysphagia []Dysarthria []Paresis [x]Encephalopathic  SKIN:   []Normal   [x]Pressure ulcer(s)  []Rash    CRITICAL CARE:  [ ]Shock Present  [ ]Septic [ ]Cardiogenic [ ]Neurologic [ ]Hypovolemic  [ ] Vasopressors [ ]Inotropes   [x]Respiratory failure present [ ]Mechanical Ventilation [ ]Non-invasive ventilatory support [x]High-Flow  [x]Acute  [ ]Chronic [x]Hypoxic  [ ]Hypercarbic   [ ]Other organ failure    Vital Signs Last 24 Hrs  T(C): 36.7 (10 Apr 2025 09:41), Max: 37.5 (09 Apr 2025 21:55)  T(F): 98.1 (10 Apr 2025 09:41), Max: 99.5 (09 Apr 2025 21:55)  HR: 101 (10 Apr 2025 10:00) (88 - 114)  ABP: 118/48 (10 Apr 2025 10:00) (106/43 - 153/65)  ABP(mean): 71 (10 Apr 2025 10:00) (65 - 99)  RR: 22 (10 Apr 2025 10:00) (15 - 23)  SpO2: 92% (10 Apr 2025 10:00) (92% - 99%)    Parameters below as of 10 Apr 2025 13:00  Patient On (Oxygen Delivery Method): nasal cannula, high flow  O2 Flow (L/min): 60  O2 Concentration (%): 50    LABS: Personally reviewed and interpreted  None new    RADIOLOGY & ADDITIONAL STUDIES: Personally reviewed and interpreted  None new    DISCUSSION OF CASE: Son - to provide updates and emotional support; Primary Team/RN - to discuss plan of care

## 2025-04-10 NOTE — PROGRESS NOTE ADULT - PROBLEM SELECTOR PLAN 4
In the setting of respiratory failure  -supports limited life expectancy and poor prognosis  -unclear if there is anoxic injury
In the setting of respiratory failure  -supports limited life expectancy and poor prognosis
In the setting of respiratory failure  -supports limited life expectancy and poor prognosis

## 2025-04-10 NOTE — PROGRESS NOTE ADULT - SUBJECTIVE AND OBJECTIVE BOX
SUBJECTIVE:  BERNARD RODRIGUEZ is doing well this morning. Today is hospital day 5d.     24 Hour Events:   - No acute overnight events.      MEDICATIONS:  STANDING MEDICATIONS  chlorhexidine 2% Cloths 1 Application(s) Topical <User Schedule>  dextrose 5% + lactated ringers. 1000 milliLiter(s) IV Continuous <Continuous>  fentaNYL   Infusion 0.5 MICROgram(s)/kG/Hr IV Continuous <Continuous>  hydrocortisone sodium succinate Injectable 50 milliGRAM(s) IV Push every 12 hours  levETIRAcetam  IVPB 500 milliGRAM(s) IV Intermittent every 12 hours  levothyroxine 25 MICROGram(s) Oral every 24 hours  pantoprazole  Injectable 40 milliGRAM(s) IV Push every 24 hours  piperacillin/tazobactam IVPB.. 4.5 Gram(s) IV Intermittent every 8 hours    PRN MEDICATIONS  glycopyrrolate Injectable 0.4 milliGRAM(s) IV Push every 6 hours PRN  LORazepam   Injectable 0.5 milliGRAM(s) IV Push every 4 hours PRN  morphine  - Injectable 2 milliGRAM(s) IV Push every 2 hours PRN    VITALS:   ICU Vital Signs Last 24 Hrs  T(C): 36.7 (10 Apr 2025 09:41), Max: 37.5 (09 Apr 2025 21:55)  T(F): 98.1 (10 Apr 2025 09:41), Max: 99.5 (09 Apr 2025 21:55)  HR: 101 (10 Apr 2025 10:00) (88 - 114)  BP: --  BP(mean): --  ABP: 118/48 (10 Apr 2025 10:00) (106/43 - 153/65)  ABP(mean): 71 (10 Apr 2025 10:00) (65 - 99)  RR: 22 (10 Apr 2025 10:00) (15 - 23)  SpO2: 92% (10 Apr 2025 10:00) (92% - 99%)    O2 Parameters below as of 10 Apr 2025 11:00  Patient On (Oxygen Delivery Method): nasal cannula, high flow  O2 Flow (L/min): 60  O2 Concentration (%): 50      PHYSICAL EXAM  General: well appearing, in no acute distress    HEENT: NC/AT, sclera anicteric, conjunctiva clear, mucus membranes moist, no lymphadenopathy, no JVD appreciated   Lungs: anterior and posterior lung fields clear to auscultation bilaterally, no wheezes, rhonchi or rales   Heart: regular rate and rhythm, normal S1 S2, no murmurs/rubs/gallops    Abdomen: soft, non-tender, non-distended, bowel sounds present   Extremities: atraumatic, no lower extremity edema, clubbing or cyanosis, distal pulses 2+ at DP and radial bilaterally    Skin: normal skin texture and turgor without rashes or lesions, no diaphoresis   Neuro: A&Ox3, clear speech, facial features symmetrical, moving all extremities spontaneously, strength grossly intact, sensation intact to light touch at distal upper and lower extremities bilaterally     04-09-25 @ 07:01  -  04-10-25 @ 07:00  --------------------------------------------------------  IN: 2845.8 mL / OUT: 665 mL / NET: 2180.8 mL    04-10-25 @ 07:01  -  04-10-25 @ 10:28  --------------------------------------------------------  IN: 256.5 mL / OUT: 20 mL / NET: 236.5 mL      Last Bowel Movement: 08-Apr-2025 (04-09-25 @ 09:00)  Last Bowel Movement: 08-Apr-2025 (04-08-25 @ 21:00)  Last Bowel Movement: 05-Apr-2025 (04-05-25 @ 08:45)  Last Bowel Movement: 05-Apr-2025 (04-05-25 @ 03:40)    Orthostatic VS      VENT DATA:      LABS:              ABG - ( 08 Apr 2025 16:16 )  pH, Arterial: 7.47  pH, Blood: x     /  pCO2: 39    /  pO2: 74    / HCO3: 28    / Base Excess: 4.5   /  SaO2: 97.0                    MICRO:    CARDS:        RADIOLOGY:        Lines:  Central line:              Date placed:             Indication:   Intravenous Access:   NG tube:   Carnes Catheter:   Indwelling Urethral Catheter:     Connect To:  Straight Drainage/Gravity    Indication:  Urine Output Monitoring in Critically Ill (04-05-25 @ 05:11) (not performed) [Active]         SUBJECTIVE:  BERNARD RODRIGUEZ is doing well this morning. Today is hospital day 5d.     24 Hour Events:   - No acute overnight events.  - patient on CPAP  - 4/9:  1 mg of ativan q6h for myclonic activity. MEWS exempt, waiting on 7 Wol bed  - @ 1200 - O2 sat dropped to 14% and patient w agonal breathing, family informed that patient is in dying process, reaffirmed code status DNR/DNI    MEDICATIONS:  STANDING MEDICATIONS  chlorhexidine 2% Cloths 1 Application(s) Topical <User Schedule>  dextrose 5% + lactated ringers. 1000 milliLiter(s) IV Continuous <Continuous>  fentaNYL   Infusion 0.5 MICROgram(s)/kG/Hr IV Continuous <Continuous>  hydrocortisone sodium succinate Injectable 50 milliGRAM(s) IV Push every 12 hours  levETIRAcetam  IVPB 500 milliGRAM(s) IV Intermittent every 12 hours  levothyroxine 25 MICROGram(s) Oral every 24 hours  pantoprazole  Injectable 40 milliGRAM(s) IV Push every 24 hours  piperacillin/tazobactam IVPB.. 4.5 Gram(s) IV Intermittent every 8 hours    PRN MEDICATIONS  glycopyrrolate Injectable 0.4 milliGRAM(s) IV Push every 6 hours PRN  LORazepam   Injectable 0.5 milliGRAM(s) IV Push every 4 hours PRN  morphine  - Injectable 2 milliGRAM(s) IV Push every 2 hours PRN    VITALS:   ICU Vital Signs Last 24 Hrs  T(C): 36.7 (10 Apr 2025 09:41), Max: 37.5 (09 Apr 2025 21:55)  T(F): 98.1 (10 Apr 2025 09:41), Max: 99.5 (09 Apr 2025 21:55)  HR: 101 (10 Apr 2025 10:00) (88 - 114)  BP: --  BP(mean): --  ABP: 118/48 (10 Apr 2025 10:00) (106/43 - 153/65)  ABP(mean): 71 (10 Apr 2025 10:00) (65 - 99)  RR: 22 (10 Apr 2025 10:00) (15 - 23)  SpO2: 92% (10 Apr 2025 10:00) (92% - 99%)    O2 Parameters below as of 10 Apr 2025 11:00  Patient On (Oxygen Delivery Method): nasal cannula, high flow  O2 Flow (L/min): 60  O2 Concentration (%): 50      PHYSICAL EXAM  General: well appearing, in no acute distress    HEENT: NC/AT, sclera anicteric, conjunctiva clear, mucus membranes moist, no lymphadenopathy, no JVD appreciated   Lungs: anterior and posterior lung fields clear to auscultation bilaterally, no wheezes, rhonchi or rales   Heart: regular rate and rhythm, normal S1 S2, no murmurs/rubs/gallops    Abdomen: soft, non-tender, non-distended, bowel sounds present   Extremities: atraumatic, no lower extremity edema, clubbing or cyanosis, distal pulses 2+ at DP and radial bilaterally    Skin: normal skin texture and turgor without rashes or lesions, no diaphoresis   Neuro: A&Ox3, clear speech, facial features symmetrical, moving all extremities spontaneously, strength grossly intact, sensation intact to light touch at distal upper and lower extremities bilaterally     04-09-25 @ 07:01  -  04-10-25 @ 07:00  --------------------------------------------------------  IN: 2845.8 mL / OUT: 665 mL / NET: 2180.8 mL    04-10-25 @ 07:01  -  04-10-25 @ 10:28  --------------------------------------------------------  IN: 256.5 mL / OUT: 20 mL / NET: 236.5 mL      Last Bowel Movement: 08-Apr-2025 (04-09-25 @ 09:00)  Last Bowel Movement: 08-Apr-2025 (04-08-25 @ 21:00)  Last Bowel Movement: 05-Apr-2025 (04-05-25 @ 08:45)  Last Bowel Movement: 05-Apr-2025 (04-05-25 @ 03:40)    Orthostatic VS      VENT DATA:      LABS:              ABG - ( 08 Apr 2025 16:16 )  pH, Arterial: 7.47  pH, Blood: x     /  pCO2: 39    /  pO2: 74    / HCO3: 28    / Base Excess: 4.5   /  SaO2: 97.0                    MICRO:    CARDS:        RADIOLOGY:        Lines:  Central line:              Date placed:             Indication:   Intravenous Access:   NG tube:   Carnes Catheter:   Indwelling Urethral Catheter:     Connect To:  Straight Drainage/Gravity    Indication:  Urine Output Monitoring in Critically Ill (04-05-25 @ 05:11) (not performed) [Active]

## 2025-04-10 NOTE — PROGRESS NOTE ADULT - PROBLEM SELECTOR PLAN 7
Complex medical decision making / symptom management in the setting of advanced illness.    Will continue to follow for ongoing GOC discussion / titration of palliative regimen. Emotional support provided, questions answered.  Active Psychosocial Referrals:  [x]Social Work/Case management []PT/OT [x]Chaplaincy []Hospice  []Patient/Family Support []Holistic RN []Massage Therapy []Music Therapy []Ethics  Coping: [] well [] with difficulty [] poor coping [x] unable to assess  Support system: [x] strong [] adequate [] inadequate    For new or uncontrolled symptoms, please call Palliative Care at [phone]). The service is available 24/7 (including nights & weekends) to provide symptom management recommendations over the phone as appropriate
Complex medical decision making / symptom management in the setting of advanced illness.    Will continue to follow for ongoing GOC discussion / titration of palliative regimen. Emotional support provided, questions answered.  Active Psychosocial Referrals:  [x]Social Work/Case management []PT/OT [x]Chaplaincy []Hospice  []Patient/Family Support []Holistic RN []Massage Therapy []Music Therapy []Ethics  Coping: [] well [] with difficulty [] poor coping [x] unable to assess  Support system: [x] strong [] adequate [] inadequate    For new or uncontrolled symptoms, please call Palliative Care at [phone]). The service is available 24/7 (including nights & weekends) to provide symptom management recommendations over the phone as appropriate
Complex medical decision making / symptom management in the setting of advanced illness.    Will continue to follow for ongoing GOC discussion / titration of palliative regimen. Emotional support provided, questions answered.  Active Psychosocial Referrals:  [x]Social Work/Case management []PT/OT [x]Chaplaincy []Hospice  []Patient/Family Support []Holistic RN []Massage Therapy []Music Therapy []Ethics  Coping: [] well [] with difficulty [] poor coping [x] unable to assess  Support system: [x] strong [] adequate [] inadequate    For new or uncontrolled symptoms, please call Palliative Care at [phone]). The service is available 24/7 (including nights & weekends) to provide symptom management recommendations over the phone as appropriate

## 2025-04-10 NOTE — DISCHARGE NOTE FOR THE EXPIRED PATIENT - HOSPITAL COURSE
Patient is an 88 yo F w PMHx Alzheimer's dementia, cataracts, hypothyroidism, HLD, DAYSI, SVT, chronic pressure wounds, dysphagia (s/p PEG) who presented to [facility] ED iso AMS and hypoxia, found to have AHRF PNA, was ROSC after 4 rounds of CPR in the ED and intubated in the ED. Patient with myclonic jerks 2/ to hypoxic brain injury on the ICU on admission, Keppra loaded at that time. On fentanyl and propofol sedaition with sputumpositive for pseudomonas sensitive to piptazo, started on 4/5 on zosyn 4.5mg q8h. Per repeat GOC patient DNR/DNI once extubated.Gen sx consulted on 4/5 to assess for operative potential of chronic sacral wounds, non-operative reccomendations, wound care consulted on 4/6 to provide ongoing care. Patient was extubated on 4/9 after passing a CPAP trial with a GOC to not re-intubate. Ativan 0.5mg-1mg given PRN for agitation and jerking movements. Around 1200 on 4/10 patient had an acute desaturation of O2 to 14% with agonal breathing, with cardiopulmonary arrest at 1310. Patient is an 89 yo F w PMHx Alzheimer's dementia, cataracts, hypothyroidism, HLD, DAYSI, SVT, chronic pressure wounds, dysphagia (s/p PEG) who presented to [facility] ED iso AMS and hypoxia, found to have AHRF PNA, was ROSC after 4 rounds of CPR in the ED and intubated in the ED. Patient with myclonic jerks 2/ to hypoxic brain injury on the ICU on admission, Keppra loaded at that time. On fentanyl and propofol sedation with sputum positive for pseudomonas sensitive to piptazo, started on 4/5 on zosyn 4.5mg q8h. Per repeat GOC patient DNR/DNI once extubated.Gen sx consulted on 4/5 to assess for operative potential of chronic sacral wounds, non-operative recommendations wound care consulted on 4/6 to provide ongoing care. Patient was extubated on 4/9 after passing a CPAP trial with a GOC to not re-intubate. Ativan 0.5mg-1mg given PRN for agitation and jerking movements. Around 1200 on 4/10 patient had an acute desaturation of O2 to 14% with agonal breathing, with cardiopulmonary arrest at 1310.

## 2025-04-10 NOTE — PROGRESS NOTE ADULT - ASSESSMENT
Patient is an 89 yo F w PMHx Alzheimer's dementia, cataracts, hypothyroidism, HLD, DAYSI, SVT, chronic pressure wounds, dysphagia (s/p PEG) who presented to [facility] ED iso AMS and hypoxia, found to have AHRF pna and s/p code (4 rounds of CPR on 4/5 before ROSC) iso hypoxia 2/2 mucus plug vs aspiration    Neuro   #Acute metabolic encephalopathy #AD #Anoxic brain injury w myoclonic jerks  Baseline mental status AAOX0, nonsensical speech.  Patient was more lethargic and with increasing O2 requirements, prompting arrival to ED. Likely anoxic brain injury with myoclonic jerks appreciated on 4/5, started on keppra load with keppra 500mg BID on 4/5  Home meds: trazodone 50mg BID, Aricept 10mg, Seroquel 50mg, memantine 10mg BID  Multifactorial iso sepsis, hypoxia w poor baseline mental status   While in the ED on 4/5, patient coded and had CPR x4 with ROSC and intubated and transferred to MICU.  TSH elevated, free T4 elevated.   CTH: No evidence of acute intracranial pathology.    Plan  - treat pna and IVF per GOC    #FTT  Patient with physical frailty, disability (relies on family and HHA w all ADLs), impaired neuropsychiatric function (AD, baseline AAOx0)  - for malnutrition - thiamine 200mg IV for 5 d    Pulm   #AHRF #ongoing pna #mucus plugging  Patient w increased secretions and fever at home and was being treated for pna w Cefpodoxime (had not finished 10d course) and Doxycycline (completed 5d course). Patient developed fevers on abx (Tmax 101) w increasing O2 requirements (at baseline, uses 1L NC during sleep), prompting them to seek medical attention.   In ED, patient was saturating mid 70s on RA> 93% on NC>90s on HFNC. As per ED, pt became acutely hypoxic in which they believe to be 2/2 mucus plugging vs aspiration and coded s/p intubation and 4 rounds of CPR with ROSC obtained.   mechanical ventilation settings in ED:  VCAC/18/400/5/80  CT Chest: Small left apical hydropneumothorax. Left basilar atelectasis. Trace right pleural effusion with associated atelectasis. 6 mm indeterminate right middle lobe lung nodule (3:35). Bilateral patchy airspace opacities which could represent pneumonia.   Plan  - pseudomonas + on sputum cx - zosyn 4.5mg q8h for PsA dosing  - DNR/DNI, patient passed cpap trial    Cards  #septic shock #narrow complex tachycardia #hypotension  Patient meeting 4/4 SIRS criteria (T 101 at home, , RR 23, WBC 14.57) +s/p cardiac arrest, now requiring pressors   Home meds: Midodrine 10mg q6 hrs prn, Metoprolol 12.5mg BID, Digoxin 0.125mg BID, ASA 81mg    Plan  - complete levophed bag on 4/6, no further pressors as per GOC discussion 4/5   - hold all home cardio meds given prognosis/GOC    #HLD   home meds: Lipitor 10mg  Plan  - hold Lipitor given prognosis, lack of benefit for >10 year ASVD risk    GI  #PEG  Patient w PEG tube placed iso dysphagia. Patient w c/f asp pna on admission 4/5  Plan  - holding tube feeds iso c/f aspiration   - nutrition consulted   - FSG q6 hrs      strict I/Os    Renal    Cystatin C 1.48, eGFR 38     Endo  #hypothyroidism  TSH & free T4 elevated. Home med levothyroxine 25 mcg.     Plan  - c/w home med via PEG tube    #NPO  check FSG q6 hrs     Heme  #DAYSI  Patient w known DAYSI. As per Stefano, was suppose to be started on ferrous sulfate.   Hgb on admission: Hgb 9, MCV 77; No active signs of bleeding, Hgb drop to 6.0, has been receiving 100cc D5 LR  Iron total 23, TIBC 197, % sat 12, ferritin 107  Plan  - stop CBC - no more daily labs per GOC    #DVT ppx  Patient w acute drop in Hgb without evidence of bleed  - will hold DVTppx for now  - c/w SCD    MSK  #pressure wounds  In January, pt w  bleeding R hip pressure wound after debridement with wound care at home the day prior, s/p suture ligation of oozing vessels with surgery   stage 4 R thigh, L thigh , and thoracic- As per Stefano, patient underwent L thigh skin graft 3 weeks ago   On PE, purulence noted in sacrum c/f OM  ESR 45 elevated    Plan  - gen sx 4/5 consulted, no operative management, no concern for necrotizing wound  - Wet and dry Kerlix packing daily. Secure with clean gauze/abdominal pad with tape on top.  - wound care consult (4/5)    ID  #[email or web address]  #pna #pressure wounds  Patient w increased secretions and fever at home and was being treated for pna w Cefpodoxime and Doxycycline. Patient developed fevers on abx (Tmax 101) w increasing O2 requirements (at baseline, uses 1L NC during sleep), prompting them to seek medical attention. In ED, patient was saturating mid 70s on RA> 93% on NC>90s on HFNC. As per ED, pt became acutely hypoxic in which they believe to be 2/2 mucus plugging vs aspiration - s/p code w intubation  In terms of pressure wounds, patient w chronic wounds as described below. On PE, sacral ulcer w purulence   MRSA negative, rapid RVP negative, urine legionella & pneumonia negative. Procal elevated. dc'd azithromycin.   Plan  - holding tube feeds  - f/u sputum cx 4/5 with PsA  - c/w abx: zosyn 4.5mg from 4/5 -  - dc vanc given MRSA neg, no concern for necrotizing SSTI   - wound care consulted    PPX  F: D5/LR @ 100cc/hr   N: npo   GI PPx: pantoprazole 40 IV q24h  DVT PPx: SCDs   Dispo: MICU   Code Status: DNR/intubated

## 2025-04-10 NOTE — PROGRESS NOTE ADULT - PROBLEM SELECTOR PLAN 2
PPSV = 10%, requires total assistance for all ADLs  -c/w supportive care, turning and positioning

## 2025-04-11 DIAGNOSIS — R06.00 DYSPNEA, UNSPECIFIED: ICD-10-CM

## 2025-04-11 DIAGNOSIS — Z51.5 ENCOUNTER FOR PALLIATIVE CARE: ICD-10-CM

## 2025-04-11 DIAGNOSIS — I46.9 CARDIAC ARREST, CAUSE UNSPECIFIED: ICD-10-CM

## 2025-04-11 DIAGNOSIS — G30.9 ALZHEIMER'S DISEASE, UNSPECIFIED: ICD-10-CM

## 2025-04-11 DIAGNOSIS — Z71.89 OTHER SPECIFIED COUNSELING: ICD-10-CM

## 2025-04-11 DIAGNOSIS — R53.2 FUNCTIONAL QUADRIPLEGIA: ICD-10-CM

## 2025-04-11 DIAGNOSIS — J96.01 ACUTE RESPIRATORY FAILURE WITH HYPOXIA: ICD-10-CM

## 2025-04-16 DIAGNOSIS — L89.219 PRESSURE ULCER OF RIGHT HIP, UNSPECIFIED STAGE: ICD-10-CM

## 2025-04-16 DIAGNOSIS — I46.8 CARDIAC ARREST DUE TO OTHER UNDERLYING CONDITION: ICD-10-CM

## 2025-04-16 DIAGNOSIS — F02.80 DEMENTIA IN OTHER DISEASES CLASSIFIED ELSEWHERE, UNSPECIFIED SEVERITY, WITHOUT BEHAVIORAL DISTURBANCE, PSYCHOTIC DISTURBANCE, MOOD DISTURBANCE, AND ANXIETY: ICD-10-CM

## 2025-04-16 DIAGNOSIS — E46 UNSPECIFIED PROTEIN-CALORIE MALNUTRITION: ICD-10-CM

## 2025-04-16 DIAGNOSIS — G30.9 ALZHEIMER'S DISEASE, UNSPECIFIED: ICD-10-CM

## 2025-04-16 DIAGNOSIS — G93.41 METABOLIC ENCEPHALOPATHY: ICD-10-CM

## 2025-04-16 DIAGNOSIS — J94.8 OTHER SPECIFIED PLEURAL CONDITIONS: ICD-10-CM

## 2025-04-16 DIAGNOSIS — G93.1 ANOXIC BRAIN DAMAGE, NOT ELSEWHERE CLASSIFIED: ICD-10-CM

## 2025-04-16 DIAGNOSIS — D50.9 IRON DEFICIENCY ANEMIA, UNSPECIFIED: ICD-10-CM

## 2025-04-16 DIAGNOSIS — Z93.1 GASTROSTOMY STATUS: ICD-10-CM

## 2025-04-16 DIAGNOSIS — G25.3 MYOCLONUS: ICD-10-CM

## 2025-04-16 DIAGNOSIS — L89.154 PRESSURE ULCER OF SACRAL REGION, STAGE 4: ICD-10-CM

## 2025-04-16 DIAGNOSIS — R62.7 ADULT FAILURE TO THRIVE: ICD-10-CM

## 2025-04-16 DIAGNOSIS — A41.9 SEPSIS, UNSPECIFIED ORGANISM: ICD-10-CM

## 2025-04-16 DIAGNOSIS — J69.0 PNEUMONITIS DUE TO INHALATION OF FOOD AND VOMIT: ICD-10-CM

## 2025-04-16 DIAGNOSIS — E03.9 HYPOTHYROIDISM, UNSPECIFIED: ICD-10-CM

## 2025-04-16 DIAGNOSIS — H26.9 UNSPECIFIED CATARACT: ICD-10-CM

## 2025-04-16 DIAGNOSIS — E78.5 HYPERLIPIDEMIA, UNSPECIFIED: ICD-10-CM

## 2025-04-16 DIAGNOSIS — R53.2 FUNCTIONAL QUADRIPLEGIA: ICD-10-CM

## 2025-04-16 DIAGNOSIS — I10 ESSENTIAL (PRIMARY) HYPERTENSION: ICD-10-CM

## 2025-04-16 DIAGNOSIS — J96.01 ACUTE RESPIRATORY FAILURE WITH HYPOXIA: ICD-10-CM

## 2025-04-16 DIAGNOSIS — B96.5 PSEUDOMONAS (AERUGINOSA) (MALLEI) (PSEUDOMALLEI) AS THE CAUSE OF DISEASES CLASSIFIED ELSEWHERE: ICD-10-CM

## 2025-04-16 DIAGNOSIS — Z74.01 BED CONFINEMENT STATUS: ICD-10-CM

## 2025-04-16 DIAGNOSIS — R65.21 SEVERE SEPSIS WITH SEPTIC SHOCK: ICD-10-CM

## 2025-04-16 DIAGNOSIS — Z66 DO NOT RESUSCITATE: ICD-10-CM

## 2025-04-16 DIAGNOSIS — R13.10 DYSPHAGIA, UNSPECIFIED: ICD-10-CM

## 2025-04-16 DIAGNOSIS — R53.83 OTHER FATIGUE: ICD-10-CM
